# Patient Record
Sex: MALE | Race: WHITE | NOT HISPANIC OR LATINO | Employment: OTHER | URBAN - METROPOLITAN AREA
[De-identification: names, ages, dates, MRNs, and addresses within clinical notes are randomized per-mention and may not be internally consistent; named-entity substitution may affect disease eponyms.]

---

## 2017-02-20 RX ORDER — OMEPRAZOLE 20 MG/1
CAPSULE, DELAYED RELEASE ORAL
Qty: 90 CAPSULE | Refills: 1 | Status: SHIPPED | OUTPATIENT
Start: 2017-02-20 | End: 2017-07-07 | Stop reason: SDUPTHER

## 2017-03-01 ENCOUNTER — LAB (OUTPATIENT)
Dept: ENDOCRINOLOGY | Age: 77
End: 2017-03-01

## 2017-03-01 DIAGNOSIS — E78.5 ELEVATED LIPIDS: ICD-10-CM

## 2017-03-01 DIAGNOSIS — IMO0002 UNCONTROLLED TYPE 2 DIABETES MELLITUS WITH OTHER SPECIFIED COMPLICATION: ICD-10-CM

## 2017-03-01 DIAGNOSIS — E55.9 VITAMIN D DEFICIENCY: ICD-10-CM

## 2017-03-01 DIAGNOSIS — R80.9 MICROALBUMINURIA: ICD-10-CM

## 2017-03-01 DIAGNOSIS — E29.1 HYPOGONADISM IN MALE: ICD-10-CM

## 2017-03-01 DIAGNOSIS — E78.5 HYPERLIPIDEMIA, UNSPECIFIED HYPERLIPIDEMIA TYPE: Primary | ICD-10-CM

## 2017-03-01 DIAGNOSIS — E55.9 VITAMIN D DEFICIENCY: Primary | ICD-10-CM

## 2017-03-01 DIAGNOSIS — E78.5 HYPERLIPIDEMIA, UNSPECIFIED HYPERLIPIDEMIA TYPE: ICD-10-CM

## 2017-03-02 LAB
25(OH)D3+25(OH)D2 SERPL-MCNC: 41.9 NG/ML (ref 30–100)
ALBUMIN SERPL-MCNC: 4.2 G/DL (ref 3.5–5.2)
ALBUMIN/GLOB SERPL: 2 G/DL
ALP SERPL-CCNC: 52 U/L (ref 39–117)
ALT SERPL-CCNC: 16 U/L (ref 1–41)
AST SERPL-CCNC: 17 U/L (ref 1–40)
BILIRUB SERPL-MCNC: 0.3 MG/DL (ref 0.1–1.2)
BUN SERPL-MCNC: 19 MG/DL (ref 8–23)
BUN/CREAT SERPL: 15.8 (ref 7–25)
C PEPTIDE SERPL-MCNC: 0.7 NG/ML (ref 1.1–4.4)
CALCIUM SERPL-MCNC: 9.2 MG/DL (ref 8.6–10.5)
CHLORIDE SERPL-SCNC: 103 MMOL/L (ref 98–107)
CHOLEST SERPL-MCNC: 105 MG/DL (ref 0–200)
CO2 SERPL-SCNC: 26.3 MMOL/L (ref 22–29)
CONV COMMENT: ABNORMAL
CREAT SERPL-MCNC: 1.2 MG/DL (ref 0.76–1.27)
GLOBULIN SER CALC-MCNC: 2.1 GM/DL
GLUCOSE SERPL-MCNC: 76 MG/DL (ref 65–99)
HBA1C MFR BLD: 8.08 % (ref 4.8–5.6)
HDLC SERPL-MCNC: 35 MG/DL (ref 40–60)
LDLC SERPL CALC-MCNC: 49 MG/DL (ref 0–100)
MICROALBUMIN UR-MCNC: 4.5 UG/ML
POTASSIUM SERPL-SCNC: 4.4 MMOL/L (ref 3.5–5.2)
PROT SERPL-MCNC: 6.3 G/DL (ref 6–8.5)
SHBG SERPL-SCNC: 34.6 NMOL/L (ref 19.3–76.4)
SODIUM SERPL-SCNC: 143 MMOL/L (ref 136–145)
T3FREE SERPL-MCNC: 3.3 PG/ML (ref 2–4.4)
T4 FREE SERPL-MCNC: 1.02 NG/DL (ref 0.93–1.7)
T4 SERPL-MCNC: 5.82 MCG/DL (ref 4.5–11.7)
TESTOST FREE SERPL-MCNC: 5 PG/ML (ref 6.6–18.1)
TESTOST SERPL-MCNC: 258 NG/DL (ref 348–1197)
TRIGL SERPL-MCNC: 105 MG/DL (ref 0–150)
TSH SERPL DL<=0.005 MIU/L-ACNC: 2.31 MIU/ML (ref 0.27–4.2)
URATE SERPL-MCNC: 6.3 MG/DL (ref 3.4–7)
VLDLC SERPL CALC-MCNC: 21 MG/DL (ref 5–40)

## 2017-03-22 ENCOUNTER — OFFICE VISIT (OUTPATIENT)
Dept: ENDOCRINOLOGY | Age: 77
End: 2017-03-22

## 2017-03-22 VITALS
DIASTOLIC BLOOD PRESSURE: 82 MMHG | HEIGHT: 72 IN | SYSTOLIC BLOOD PRESSURE: 134 MMHG | BODY MASS INDEX: 35.87 KG/M2 | WEIGHT: 264.8 LBS | RESPIRATION RATE: 16 BRPM

## 2017-03-22 DIAGNOSIS — IMO0002 UNCONTROLLED TYPE 2 DIABETES MELLITUS WITH OTHER SPECIFIED COMPLICATION: Primary | ICD-10-CM

## 2017-03-22 DIAGNOSIS — I10 BENIGN ESSENTIAL HYPERTENSION: ICD-10-CM

## 2017-03-22 DIAGNOSIS — E55.9 VITAMIN D DEFICIENCY: ICD-10-CM

## 2017-03-22 DIAGNOSIS — R80.9 MICROALBUMINURIA: ICD-10-CM

## 2017-03-22 DIAGNOSIS — E78.5 HYPERLIPIDEMIA, UNSPECIFIED HYPERLIPIDEMIA TYPE: ICD-10-CM

## 2017-03-22 PROCEDURE — 99214 OFFICE O/P EST MOD 30 MIN: CPT | Performed by: INTERNAL MEDICINE

## 2017-03-22 RX ORDER — CANAGLIFLOZIN 300 MG/1
300 TABLET, FILM COATED ORAL DAILY
Qty: 90 TABLET | Refills: 3 | Status: SHIPPED | OUTPATIENT
Start: 2017-03-22 | End: 2017-08-14 | Stop reason: SDUPTHER

## 2017-03-22 NOTE — PROGRESS NOTES
"Subjective   Noel Garcia is a 76 y.o. male seen for follow up for DM2, hyperlipidemia, HTN, vit d deficiency, lab review. He is checking BG once a day. No BG readings. He denies any problems or concerns.   History of Present Illness this is a 76-year-old gentleman known patient with type II diabetes hypertension dyslipidemia and vitamin D deficiency.  Over the course of last 6 months he has had no significant health problems for which to go to the emergency room or hospital  /82  Resp 16  Ht 72\" (182.9 cm)  Wt 264 lb 12.8 oz (120 kg)  BMI 35.91 kg/m2  Allergies   Allergen Reactions   • Atorvastatin    • Gemfibrozil        Current Outpatient Prescriptions:   •  ACCU-CHEK ROSETTA test strip, Check bs's 3 times daily, Disp: 300 each, Rfl: 4  •  ACCU-CHEK FASTCLIX LANCETS misc, Check 3 times daily, Disp: 300 each, Rfl: 4  •  aspirin 325 MG tablet, Take 1 tablet by mouth daily., Disp: , Rfl:   •  Canagliflozin 100 MG tablet, Take 1 po daily, Disp: 90 tablet, Rfl: 1  •  ergocalciferol (ERGOCALCIFEROL) 79741 UNITS capsule, Take 1 capsule by mouth 1 (One) Time Per Week., Disp: 13 capsule, Rfl: 3  •  ezetimibe-simvastatin (VYTORIN) 10-40 MG per tablet, Take 1 po daily, Disp: 90 tablet, Rfl: 3  •  Insulin Glargine 300 UNIT/ML solution pen-injector, Inject 130 Units under the skin Every Morning., Disp: 36 pen, Rfl: 3  •  Insulin Pen Needle 31G X 5 MM misc, 1 Device 2 (Two) Times a Day., Disp: 200 each, Rfl: 3  •  linagliptin (TRADJENTA) 5 MG tablet tablet, Take 1 tablet by mouth Daily., Disp: 90 tablet, Rfl: 3  •  omeprazole (priLOSEC) 20 MG capsule, Take 1 capsule by mouth  daily, Disp: 90 capsule, Rfl: 1  •  pioglitazone-metFORMIN (ACTOPLUS MET)  MG per tablet, Take 1 tablet by mouth 2 (Two) Times a Day., Disp: 180 tablet, Rfl: 3    The following portions of the patient's history were reviewed and updated as appropriate: allergies, current medications, past family history, past medical history, past social " history, past surgical history and problem list.    Review of Systems   Constitutional: Negative.    HENT: Negative.    Eyes: Negative.    Respiratory: Negative.    Cardiovascular: Negative.    Gastrointestinal: Negative.    Endocrine: Negative.    Genitourinary: Negative.    Musculoskeletal: Negative.    Skin: Negative.    Allergic/Immunologic: Negative.    Neurological: Negative.    Hematological: Negative.    Psychiatric/Behavioral: Negative.        Objective   Physical Exam   Constitutional: He is oriented to person, place, and time. He appears well-developed and well-nourished. No distress.   HENT:   Head: Normocephalic and atraumatic.   Right Ear: External ear normal.   Left Ear: External ear normal.   Nose: Nose normal.   Mouth/Throat: Oropharynx is clear and moist. No oropharyngeal exudate.   Eyes: Conjunctivae and EOM are normal. Pupils are equal, round, and reactive to light. Right eye exhibits no discharge. Left eye exhibits no discharge. No scleral icterus.   Neck: Normal range of motion. Neck supple. No JVD present. No tracheal deviation present. No thyromegaly present.   Cardiovascular: Normal rate, regular rhythm, normal heart sounds and intact distal pulses.  Exam reveals no gallop and no friction rub.    No murmur heard.  Pulmonary/Chest: Effort normal and breath sounds normal. No stridor. No respiratory distress. He has no wheezes. He has no rales. He exhibits no tenderness.   Abdominal: Soft. Bowel sounds are normal. He exhibits no distension and no mass. There is no tenderness. There is no rebound and no guarding. No hernia.   Musculoskeletal: Normal range of motion. He exhibits no edema, tenderness or deformity.   Lymphadenopathy:     He has no cervical adenopathy.   Neurological: He is alert and oriented to person, place, and time. He has normal reflexes. He displays normal reflexes. No cranial nerve deficit. He exhibits normal muscle tone. Coordination normal.   Skin: Skin is warm and dry. No  rash noted. He is not diaphoretic. No erythema. No pallor.   Psychiatric: He has a normal mood and affect. His behavior is normal. Judgment and thought content normal.   Nursing note and vitals reviewed.     Results for orders placed or performed in visit on 03/01/17   Comprehensive Metabolic Panel   Result Value Ref Range    Glucose 76 65 - 99 mg/dL    BUN 19 8 - 23 mg/dL    Creatinine 1.20 0.76 - 1.27 mg/dL    eGFR Non African Am 59 (L) >60 mL/min/1.73    eGFR African Am 71 >60 mL/min/1.73    BUN/Creatinine Ratio 15.8 7.0 - 25.0    Sodium 143 136 - 145 mmol/L    Potassium 4.4 3.5 - 5.2 mmol/L    Chloride 103 98 - 107 mmol/L    Total CO2 26.3 22.0 - 29.0 mmol/L    Calcium 9.2 8.6 - 10.5 mg/dL    Total Protein 6.3 6.0 - 8.5 g/dL    Albumin 4.20 3.50 - 5.20 g/dL    Globulin 2.1 gm/dL    A/G Ratio 2.0 g/dL    Total Bilirubin 0.3 0.1 - 1.2 mg/dL    Alkaline Phosphatase 52 39 - 117 U/L    AST (SGOT) 17 1 - 40 U/L    ALT (SGPT) 16 1 - 41 U/L   C-Peptide   Result Value Ref Range    C-Peptide 0.7 (L) 1.1 - 4.4 ng/mL   Hemoglobin A1c   Result Value Ref Range    Hemoglobin A1C 8.08 (H) 4.80 - 5.60 %   Lipid Panel   Result Value Ref Range    Total Cholesterol 105 0 - 200 mg/dL    Triglycerides 105 0 - 150 mg/dL    HDL Cholesterol 35 (L) 40 - 60 mg/dL    VLDL Cholesterol 21 5 - 40 mg/dL    LDL Cholesterol  49 0 - 100 mg/dL   Uric Acid   Result Value Ref Range    Uric Acid 6.3 3.4 - 7.0 mg/dL   T4 & TSH ([facility])   Result Value Ref Range    TSH 2.310 0.270 - 4.200 mIU/mL    T4, Total 5.82 4.50 - 11.70 mcg/dL   TestT+TestF+SHBG   Result Value Ref Range    Testosterone, Total 258 (L) 348 - 1197 ng/dL    Comment Comment     Testosterone, Free 5.0 (L) 6.6 - 18.1 pg/mL    Sex Hormone Binding Globulin 34.6 19.3 - 76.4 nmol/L   T3, Free   Result Value Ref Range    T3, Free 3.3 2.0 - 4.4 pg/mL   T4, Free   Result Value Ref Range    Free T4 1.02 0.93 - 1.70 ng/dL   MicroAlbumin, Urine, Random   Result Value Ref Range    Microalbumin,  Urine 4.5 Not Estab. ug/mL           Assessment/Plan   Diagnoses and all orders for this visit:    Uncontrolled type 2 diabetes mellitus with other specified complication  -     T4 & TSH ([facility]); Future  -     Uric Acid; Future  -     Vitamin D 25 Hydroxy; Future  -     Comprehensive Metabolic Panel; Future  -     C-Peptide; Future  -     Hemoglobin A1c; Future  -     Lipid Panel; Future  -     MicroAlbumin, Urine, Random; Future    Vitamin D deficiency  -     T4 & TSH ([facility]); Future  -     Uric Acid; Future  -     Vitamin D 25 Hydroxy; Future  -     Comprehensive Metabolic Panel; Future  -     C-Peptide; Future  -     Hemoglobin A1c; Future  -     Lipid Panel; Future  -     MicroAlbumin, Urine, Random; Future    Hyperlipidemia, unspecified hyperlipidemia type  -     T4 & TSH ([facility]); Future  -     Uric Acid; Future  -     Vitamin D 25 Hydroxy; Future  -     Comprehensive Metabolic Panel; Future  -     C-Peptide; Future  -     Hemoglobin A1c; Future  -     Lipid Panel; Future  -     MicroAlbumin, Urine, Random; Future    Benign essential hypertension  -     T4 & TSH ([facility]); Future  -     Uric Acid; Future  -     Vitamin D 25 Hydroxy; Future  -     Comprehensive Metabolic Panel; Future  -     C-Peptide; Future  -     Hemoglobin A1c; Future  -     Lipid Panel; Future  -     MicroAlbumin, Urine, Random; Future    Microalbuminuria  -     T4 & TSH ([facility]); Future  -     Uric Acid; Future  -     Vitamin D 25 Hydroxy; Future  -     Comprehensive Metabolic Panel; Future  -     C-Peptide; Future  -     Hemoglobin A1c; Future  -     Lipid Panel; Future  -     MicroAlbumin, Urine, Random; Future    Other orders  -     INVOKANA 300 MG tablet; Take 300 mg by mouth Daily.               In summary I saw and examined this 76-year-old gentleman for above-mentioned problems.  I reviewed his laboratory evaluation of 03/01/2017 and provided him with a hard copy of it.  Overall he is clinically and metabolically stable with  a hemoglobin A1c of 8.08.  He will continue all his current prescriptions and the will see Ms. Danielle Liang in 4 months and myself in 8 months or sooner if needed with laboratory evaluation prior to each office visit.

## 2017-04-04 ENCOUNTER — OFFICE VISIT (OUTPATIENT)
Dept: INTERNAL MEDICINE | Age: 77
End: 2017-04-04

## 2017-04-04 VITALS
HEART RATE: 72 BPM | HEIGHT: 71 IN | DIASTOLIC BLOOD PRESSURE: 80 MMHG | BODY MASS INDEX: 36.68 KG/M2 | WEIGHT: 262 LBS | RESPIRATION RATE: 12 BRPM | SYSTOLIC BLOOD PRESSURE: 138 MMHG

## 2017-04-04 DIAGNOSIS — M54.42 CHRONIC BILATERAL LOW BACK PAIN WITH BILATERAL SCIATICA: Primary | ICD-10-CM

## 2017-04-04 DIAGNOSIS — M54.41 CHRONIC BILATERAL LOW BACK PAIN WITH BILATERAL SCIATICA: Primary | ICD-10-CM

## 2017-04-04 DIAGNOSIS — I10 BENIGN ESSENTIAL HYPERTENSION: ICD-10-CM

## 2017-04-04 DIAGNOSIS — G89.29 CHRONIC BILATERAL LOW BACK PAIN WITH BILATERAL SCIATICA: Primary | ICD-10-CM

## 2017-04-04 PROCEDURE — 99214 OFFICE O/P EST MOD 30 MIN: CPT | Performed by: INTERNAL MEDICINE

## 2017-04-04 NOTE — PROGRESS NOTES
"  Noel Garcia is a 76 y.o. male who presents with   Chief Complaint   Patient presents with   • Back Pain     Approximately one year ago while jumping off of a pickup truck he developed back pain within 24 hours.  He saw a chiropractor who helped him at the time.  He said he had no further problems up until 3-4 months ago when he was bending over and felt his low back \"pop\".  Since then he has been having worsening low back pain with pain in his hips and legs and general leg fatigue.   • Hypertension     Check blood pressure   • Diabetes     Continued treatment and management via endocrinology.   .    Back Pain   This is a chronic problem. The current episode started more than 1 month ago. The problem occurs constantly. The problem has been gradually worsening since onset. The pain is present in the lumbar spine. The pain radiates to the left foot, left knee, left thigh, right foot and right thigh. The pain is mild. The symptoms are aggravated by bending. Stiffness is present all day. Associated symptoms include leg pain and weakness. Pertinent negatives include no dysuria, fever, numbness, paresis, paresthesias, pelvic pain, perianal numbness or tingling. He has tried nothing for the symptoms.   Hypertension   This is a chronic problem. The current episode started more than 1 year ago. The problem is controlled. Past treatments include nothing.   Diabetes   He has type 2 (Treatment and management per endocrinology.  Lab testing being done through endocrinology office.) diabetes mellitus. Associated symptoms include weakness.        The following portions of the patient's history were reviewed and updated as appropriate: allergies, current medications, past medical history and problem list.    Review of Systems   Constitutional: Negative for fever.   Genitourinary: Negative for dysuria and pelvic pain.   Musculoskeletal: Positive for back pain.   Neurological: Positive for weakness. Negative for tingling, " numbness and paresthesias.       Objective   Physical Exam   Constitutional: He is oriented to person, place, and time. He appears well-developed and well-nourished. No distress.   HENT:   Head: Normocephalic and atraumatic.   Eyes: Conjunctivae and EOM are normal. Pupils are equal, round, and reactive to light.   Neck: Normal range of motion. Neck supple. No thyromegaly present.   Neck exam negative.  Carotid auscultation normal-no bruits heard.   Cardiovascular: Normal rate, regular rhythm, normal heart sounds and intact distal pulses.  Exam reveals no gallop and no friction rub.    No murmur heard.  Pulmonary/Chest: Effort normal and breath sounds normal. No respiratory distress. He has no wheezes. He has no rales. He exhibits no tenderness.   Musculoskeletal: Normal range of motion. He exhibits no edema, tenderness or deformity.   Neurological: He is alert and oriented to person, place, and time. He displays normal reflexes. No cranial nerve deficit. He exhibits normal muscle tone. Coordination normal.   Psychiatric: He has a normal mood and affect. His behavior is normal. Judgment and thought content normal.   Nursing note and vitals reviewed.      Assessment/Plan   Noel was seen today for back pain, hypertension and diabetes.    Diagnoses and all orders for this visit:    Chronic bilateral low back pain with bilateral sciatica  -     MRI Lumbar Spine Without Contrast; Future    Benign essential hypertension      Plan: MRI as above.  OTC Advil-3 3 times a day with food pending MRI report.  Continue all other treatment as prescribed.  Patient currently on no blood pressure medications.    Continue diabetic care via endocrinology.  Follow-up for backache she pending today's MRI report.

## 2017-04-11 ENCOUNTER — HOSPITAL ENCOUNTER (OUTPATIENT)
Dept: MRI IMAGING | Facility: HOSPITAL | Age: 77
Discharge: HOME | End: 2017-04-11
Admitting: INTERNAL MEDICINE

## 2017-04-11 DIAGNOSIS — M54.41 CHRONIC BILATERAL LOW BACK PAIN WITH BILATERAL SCIATICA: ICD-10-CM

## 2017-04-11 DIAGNOSIS — G89.29 CHRONIC BILATERAL LOW BACK PAIN WITH BILATERAL SCIATICA: ICD-10-CM

## 2017-04-11 DIAGNOSIS — M54.42 CHRONIC BILATERAL LOW BACK PAIN WITH BILATERAL SCIATICA: ICD-10-CM

## 2017-04-11 PROCEDURE — 72148 MRI LUMBAR SPINE W/O DYE: CPT

## 2017-04-12 ENCOUNTER — TELEPHONE (OUTPATIENT)
Dept: INTERNAL MEDICINE | Age: 77
End: 2017-04-12

## 2017-04-12 NOTE — TELEPHONE ENCOUNTER
----- Message from Peyman Encarnacion MD sent at 4/12/2017  6:15 AM EDT -----  Call the patient with this report.    MRI of the  lumbar spine shows multilevel disc bulging and degenerative (arthritic) changes at multiple levels.  There is also narrowing of the spinal canal at L4-5 which is a common finding with degenerative changes.  There is a disc tear at L3-4 but no disc bulging.  Basically these findings are all very common and are many times associated with the general aging process.  At this point there does not appear to be anything of a surgical nature however a neurosurgical referral and opinion in the future would not totally be ruled out at this time.  At this point treatment would involve usage of anti-inflammatory agents such as ibuprofen (200 mg per tablet over-the-counter) in a minimum dose of 600  mg (3 tablets)  3 times a day with food.  If this is ineffective then a trial of oral steroid's and physical therapy referral may be necessary.  If problems persist.  Follow-up visit is suggested.

## 2017-07-07 DIAGNOSIS — I10 BENIGN ESSENTIAL HYPERTENSION: ICD-10-CM

## 2017-07-07 DIAGNOSIS — E78.5 HYPERLIPIDEMIA: ICD-10-CM

## 2017-07-07 DIAGNOSIS — E55.9 VITAMIN D DEFICIENCY: ICD-10-CM

## 2017-07-07 DIAGNOSIS — E66.9 ADIPOSITY: ICD-10-CM

## 2017-07-07 DIAGNOSIS — E11.65 TYPE 2 DIABETES MELLITUS WITH HYPERGLYCEMIA (HCC): ICD-10-CM

## 2017-07-07 DIAGNOSIS — R80.9 MICROALBUMINURIA: ICD-10-CM

## 2017-07-07 DIAGNOSIS — E29.1 HYPOGONADISM IN MALE: ICD-10-CM

## 2017-07-07 DIAGNOSIS — N40.0 BENIGN NON-NODULAR PROSTATIC HYPERPLASIA WITHOUT LOWER URINARY TRACT SYMPTOMS: ICD-10-CM

## 2017-07-07 RX ORDER — INSULIN GLARGINE 300 U/ML
INJECTION, SOLUTION SUBCUTANEOUS
Qty: 45 ML | Refills: 1 | Status: SHIPPED | OUTPATIENT
Start: 2017-07-07 | End: 2017-08-11 | Stop reason: SDUPTHER

## 2017-07-07 RX ORDER — OMEPRAZOLE 20 MG/1
CAPSULE, DELAYED RELEASE ORAL
Qty: 90 CAPSULE | Refills: 1 | Status: SHIPPED | OUTPATIENT
Start: 2017-07-07 | End: 2018-06-28 | Stop reason: SDUPTHER

## 2017-07-19 DIAGNOSIS — N40.0 BENIGN NON-NODULAR PROSTATIC HYPERPLASIA WITHOUT LOWER URINARY TRACT SYMPTOMS: ICD-10-CM

## 2017-07-19 DIAGNOSIS — E55.9 VITAMIN D DEFICIENCY: ICD-10-CM

## 2017-07-19 DIAGNOSIS — IMO0002 UNCONTROLLED TYPE 2 DIABETES MELLITUS WITH OTHER SPECIFIED COMPLICATION: ICD-10-CM

## 2017-07-19 DIAGNOSIS — E29.1 HYPOGONADISM IN MALE: Primary | ICD-10-CM

## 2017-07-21 ENCOUNTER — RESULTS ENCOUNTER (OUTPATIENT)
Dept: ENDOCRINOLOGY | Age: 77
End: 2017-07-21

## 2017-07-21 DIAGNOSIS — I10 BENIGN ESSENTIAL HYPERTENSION: ICD-10-CM

## 2017-07-21 DIAGNOSIS — E55.9 VITAMIN D DEFICIENCY: ICD-10-CM

## 2017-07-21 DIAGNOSIS — IMO0002 UNCONTROLLED TYPE 2 DIABETES MELLITUS WITH OTHER SPECIFIED COMPLICATION: ICD-10-CM

## 2017-07-21 DIAGNOSIS — R80.9 MICROALBUMINURIA: ICD-10-CM

## 2017-07-21 DIAGNOSIS — E78.5 HYPERLIPIDEMIA, UNSPECIFIED HYPERLIPIDEMIA TYPE: ICD-10-CM

## 2017-07-28 ENCOUNTER — LAB (OUTPATIENT)
Dept: ENDOCRINOLOGY | Age: 77
End: 2017-07-28

## 2017-07-28 DIAGNOSIS — IMO0002 UNCONTROLLED TYPE 2 DIABETES MELLITUS WITH OTHER SPECIFIED COMPLICATION: ICD-10-CM

## 2017-07-28 DIAGNOSIS — E29.1 HYPOGONADISM IN MALE: ICD-10-CM

## 2017-07-28 DIAGNOSIS — E55.9 VITAMIN D DEFICIENCY: ICD-10-CM

## 2017-07-28 DIAGNOSIS — N40.0 BENIGN NON-NODULAR PROSTATIC HYPERPLASIA WITHOUT LOWER URINARY TRACT SYMPTOMS: ICD-10-CM

## 2017-07-30 LAB
25(OH)D3+25(OH)D2 SERPL-MCNC: 29.8 NG/ML (ref 30–100)
ALBUMIN SERPL-MCNC: 4.2 G/DL (ref 3.5–5.2)
ALBUMIN/GLOB SERPL: 1.7 G/DL
ALP SERPL-CCNC: 46 U/L (ref 39–117)
ALT SERPL-CCNC: 16 U/L (ref 1–41)
AST SERPL-CCNC: 15 U/L (ref 1–40)
BILIRUB SERPL-MCNC: 0.3 MG/DL (ref 0.1–1.2)
BUN SERPL-MCNC: 17 MG/DL (ref 8–23)
BUN/CREAT SERPL: 14 (ref 7–25)
C PEPTIDE SERPL-MCNC: 1.8 NG/ML (ref 1.1–4.4)
CALCIUM SERPL-MCNC: 9.6 MG/DL (ref 8.6–10.5)
CHLORIDE SERPL-SCNC: 103 MMOL/L (ref 98–107)
CHOLEST SERPL-MCNC: 112 MG/DL (ref 0–200)
CO2 SERPL-SCNC: 25 MMOL/L (ref 22–29)
CREAT SERPL-MCNC: 1.21 MG/DL (ref 0.76–1.27)
GLOBULIN SER CALC-MCNC: 2.5 GM/DL
GLUCOSE SERPL-MCNC: 117 MG/DL (ref 65–99)
HBA1C MFR BLD: 7.21 % (ref 4.8–5.6)
HCT VFR BLD AUTO: 44.9 % (ref 40.4–52.2)
HDLC SERPL-MCNC: 34 MG/DL (ref 40–60)
HGB BLD-MCNC: 13.9 G/DL (ref 13.7–17.6)
LDLC SERPL CALC-MCNC: 50 MG/DL (ref 0–100)
MICROALBUMIN UR-MCNC: 8.5 UG/ML
POTASSIUM SERPL-SCNC: 4.7 MMOL/L (ref 3.5–5.2)
PROT SERPL-MCNC: 6.7 G/DL (ref 6–8.5)
PSA SERPL-MCNC: 0.84 NG/ML (ref 0–4)
SHBG SERPL-SCNC: 43.3 NMOL/L (ref 19.3–76.4)
SODIUM SERPL-SCNC: 143 MMOL/L (ref 136–145)
TESTOST FREE SERPL-MCNC: 3.7 PG/ML (ref 6.6–18.1)
TESTOST SERPL-MCNC: 160 NG/DL (ref 264–916)
TRIGL SERPL-MCNC: 141 MG/DL (ref 0–150)
VLDLC SERPL CALC-MCNC: 28.2 MG/DL (ref 5–40)

## 2017-08-11 ENCOUNTER — OFFICE VISIT (OUTPATIENT)
Dept: ENDOCRINOLOGY | Age: 77
End: 2017-08-11

## 2017-08-11 VITALS
DIASTOLIC BLOOD PRESSURE: 74 MMHG | BODY MASS INDEX: 35.68 KG/M2 | SYSTOLIC BLOOD PRESSURE: 122 MMHG | HEIGHT: 72 IN | WEIGHT: 263.4 LBS

## 2017-08-11 DIAGNOSIS — E29.1 HYPOGONADISM IN MALE: ICD-10-CM

## 2017-08-11 DIAGNOSIS — E78.5 HYPERLIPIDEMIA, UNSPECIFIED HYPERLIPIDEMIA TYPE: ICD-10-CM

## 2017-08-11 DIAGNOSIS — I10 ESSENTIAL HYPERTENSION: ICD-10-CM

## 2017-08-11 DIAGNOSIS — N40.0 BENIGN NON-NODULAR PROSTATIC HYPERPLASIA WITHOUT LOWER URINARY TRACT SYMPTOMS: ICD-10-CM

## 2017-08-11 DIAGNOSIS — I10 BENIGN ESSENTIAL HYPERTENSION: ICD-10-CM

## 2017-08-11 DIAGNOSIS — R80.9 MICROALBUMINURIA: ICD-10-CM

## 2017-08-11 DIAGNOSIS — IMO0002 UNCONTROLLED TYPE 2 DIABETES MELLITUS WITH COMPLICATION, WITH LONG-TERM CURRENT USE OF INSULIN: Primary | ICD-10-CM

## 2017-08-11 DIAGNOSIS — E55.9 VITAMIN D DEFICIENCY: ICD-10-CM

## 2017-08-11 DIAGNOSIS — E66.9 ADIPOSITY: ICD-10-CM

## 2017-08-11 PROCEDURE — 99214 OFFICE O/P EST MOD 30 MIN: CPT | Performed by: NURSE PRACTITIONER

## 2017-08-11 RX ORDER — ERGOCALCIFEROL 1.25 MG/1
CAPSULE ORAL
Qty: 24 CAPSULE | Refills: 1 | Status: SHIPPED | OUTPATIENT
Start: 2017-08-11 | End: 2017-08-14 | Stop reason: SDUPTHER

## 2017-08-11 NOTE — PROGRESS NOTES
"Subjective   Noel Garcia is a 77 y.o. male is here today for follow-up.  Chief Complaint   Patient presents with   • Diabetes     recent labs, testing BG 1 time daily, pt brought meter   • Hypogonadism   • Vitamin D Deficiency     /74  Ht 72\" (182.9 cm)  Wt 263 lb 6.4 oz (119 kg)  BMI 35.72 kg/m2  Current Outpatient Prescriptions on File Prior to Visit   Medication Sig   • ACCU-CHEK ROSETTA test strip Check bs's 3 times daily   • ACCU-CHEK FASTCLIX LANCETS misc Check 3 times daily   • aspirin 325 MG tablet Take 1 tablet by mouth daily.   • ergocalciferol (ERGOCALCIFEROL) 42587 UNITS capsule Take 1 capsule by mouth 1 (One) Time Per Week.   • ezetimibe-simvastatin (VYTORIN) 10-40 MG per tablet Take 1 po daily   • Insulin Glargine 300 UNIT/ML solution pen-injector Inject 130 Units under the skin Every Morning. (Patient taking differently: Inject 140 Units under the skin Every Morning.)   • Insulin Pen Needle 31G X 5 MM misc 1 Device 2 (Two) Times a Day.   • INVOKANA 300 MG tablet Take 300 mg by mouth Daily.   • linagliptin (TRADJENTA) 5 MG tablet tablet Take 1 tablet by mouth Daily.   • omeprazole (priLOSEC) 20 MG capsule Take 1 capsule by mouth  daily   • pioglitazone-metFORMIN (ACTOPLUS MET)  MG per tablet Take 1 tablet by mouth 2 (Two) Times a Day.   • [DISCONTINUED] TOUJEO SOLOSTAR 300 UNIT/ML solution pen-injector Inject 140 units under the  skin every morning     No current facility-administered medications on file prior to visit.      Family History   Problem Relation Age of Onset   • Hypertension Father    • Coronary artery disease Father    • Diabetes Father    • Hyperlipidemia Father    • Coronary artery disease Brother      Social History   Substance Use Topics   • Smoking status: Former Smoker   • Smokeless tobacco: None   • Alcohol use Yes      Comment: SOCIAL     Allergies   Allergen Reactions   • Atorvastatin    • Gemfibrozil          History of Present Illness  Encounter Diagnoses "   Name Primary?   • Adiposity    • Vitamin D deficiency    • Uncontrolled type 2 diabetes mellitus with complication, with long-term current use of insulin Yes   • Essential hypertension    • Hyperlipidemia, unspecified hyperlipidemia type    • Microalbuminuria    • Benign essential hypertension    • Hypogonadism in male    • Benign non-nodular prostatic hyperplasia without lower urinary tract symptoms      This is a 77-year-old male patient here today for routine follow-up visit for the above-mentioned problems.  He is complaining of weight gain however according to our records his weight has remained stable.  He states his wife is dietary he is also trying to watch what he eats.  He had recent labs which were reviewed and he was provided a copy.  He states he does experience hypoglycemia on occasion however he does not Share with the meter and said he just recently feels better.  He states the last time he had a low blood sugar he was in the airport [place] and because he did not feel well he drank a carbonate stickers bar and felt better.  He is checking his blood sugars several times weekly typically in the morning.  His most recent hemoglobin A1c has improved and is almost ago.  He does not remember to take his vitamin D consistently.  The following portions of the patient's history were reviewed and updated as appropriate: allergies, current medications, past family history, past medical history, past social history, past surgical history and problem list.    Review of Systems   Constitutional: Negative for fatigue.   HENT: Negative for trouble swallowing.    Eyes: Negative for visual disturbance.   Respiratory: Negative for shortness of breath.    Cardiovascular: Negative for leg swelling.   Endocrine: Negative for polyphagia.   Skin: Negative for wound.   Neurological: Negative for numbness.       Objective   Physical Exam   Constitutional: He is oriented to person, place, and time. He  appears well-developed and well-nourished. No distress.   HENT:   Head: Normocephalic and atraumatic.   Right Ear: External ear normal.   Left Ear: External ear normal.   Nose: Nose normal.   Eyes: Pupils are equal, round, and reactive to light. Right eye exhibits no discharge. Left eye exhibits no discharge.   Neck: Normal range of motion. Neck supple. Carotid bruit is not present. No tracheal deviation, no edema and no erythema present. No thyromegaly present.   Cardiovascular: Normal rate, regular rhythm, normal heart sounds and intact distal pulses.  Exam reveals no gallop and no friction rub.    No murmur heard.  Pulmonary/Chest: Effort normal and breath sounds normal. No respiratory distress. He has no wheezes. He has no rales.   Abdominal: Soft. Bowel sounds are normal. He exhibits no distension. There is no tenderness.   Musculoskeletal: Normal range of motion. He exhibits no edema or deformity.   Lymphadenopathy:     He has no cervical adenopathy.   Neurological: He is alert and oriented to person, place, and time. Coordination normal.   Skin: Skin is warm and dry. No rash noted. He is not diaphoretic. No erythema. No pallor.   Psychiatric: He has a normal mood and affect. His behavior is normal. Judgment and thought content normal.   Nursing note and vitals reviewed.      Results for orders placed or performed in visit on 07/28/17   Comprehensive Metabolic Panel   Result Value Ref Range    Glucose 117 (H) 65 - 99 mg/dL    BUN 17 8 - 23 mg/dL    Creatinine 1.21 0.76 - 1.27 mg/dL    eGFR Non African Am 58 (L) >60 mL/min/1.73    eGFR African Am 70 >60 mL/min/1.73    BUN/Creatinine Ratio 14.0 7.0 - 25.0    Sodium 143 136 - 145 mmol/L    Potassium 4.7 3.5 - 5.2 mmol/L    Chloride 103 98 - 107 mmol/L    Total CO2 25.0 22.0 - 29.0 mmol/L    Calcium 9.6 8.6 - 10.5 mg/dL    Total Protein 6.7 6.0 - 8.5 g/dL    Albumin 4.20 3.50 - 5.20 g/dL    Globulin 2.5 gm/dL    A/G Ratio 1.7 g/dL    Total Bilirubin 0.3 0.1 - 1.2  mg/dL    Alkaline Phosphatase 46 39 - 117 U/L    AST (SGOT) 15 1 - 40 U/L    ALT (SGPT) 16 1 - 41 U/L   Lipid Panel   Result Value Ref Range    Total Cholesterol 112 0 - 200 mg/dL    Triglycerides 141 0 - 150 mg/dL    HDL Cholesterol 34 (L) 40 - 60 mg/dL    VLDL Cholesterol 28.2 5 - 40 mg/dL    LDL Cholesterol  50 0 - 100 mg/dL   Vitamin D 25 Hydroxy   Result Value Ref Range    25 Hydroxy, Vitamin D 29.8 (L) 30.0 - 100.0 ng/mL   Hemoglobin A1c   Result Value Ref Range    Hemoglobin A1C 7.21 (H) 4.80 - 5.60 %   Hemoglobin & Hematocrit, Blood   Result Value Ref Range    Hemoglobin 13.9 13.7 - 17.6 g/dL    Hematocrit 44.9 40.4 - 52.2 %   PSA   Result Value Ref Range    PSA 0.840 0.000 - 4.000 ng/mL   TestT+TestF+SHBG   Result Value Ref Range    Testosterone, Total 160 (L) 264 - 916 ng/dL    Testosterone, Free 3.7 (L) 6.6 - 18.1 pg/mL    Sex Hormone Binding Globulin 43.3 19.3 - 76.4 nmol/L   C-Peptide   Result Value Ref Range    C-Peptide 1.8 1.1 - 4.4 ng/mL   MicroAlbumin, Urine, Random   Result Value Ref Range    Microalbumin, Urine 8.5 Not Estab. ug/mL       Assessment/Plan   Noel was seen today for diabetes, hypogonadism and vitamin d deficiency.    Diagnoses and all orders for this visit:    Uncontrolled type 2 diabetes mellitus with complication, with long-term current use of insulin    Adiposity  -     ergocalciferol (ERGOCALCIFEROL) 98051 UNITS capsule; Take 1 capsule twice weekly    Vitamin D deficiency  -     ergocalciferol (ERGOCALCIFEROL) 77583 UNITS capsule; Take 1 capsule twice weekly    Essential hypertension    Hyperlipidemia, unspecified hyperlipidemia type  -     ergocalciferol (ERGOCALCIFEROL) 53579 UNITS capsule; Take 1 capsule twice weekly    Microalbuminuria  -     ergocalciferol (ERGOCALCIFEROL) 34355 UNITS capsule; Take 1 capsule twice weekly    Benign essential hypertension  -     ergocalciferol (ERGOCALCIFEROL) 60943 UNITS capsule; Take 1 capsule twice weekly    Hypogonadism in male  -      ergocalciferol (ERGOCALCIFEROL) 33982 UNITS capsule; Take 1 capsule twice weekly    Benign non-nodular prostatic hyperplasia without lower urinary tract symptoms  -     ergocalciferol (ERGOCALCIFEROL) 69207 UNITS capsule; Take 1 capsule twice weekly        In summary, patient was seen and examined.  He will continue on his current medications as prescribed.  His blood sugars were reviewed as well as his labs.  He was provided a copy of his most recent labs.  His last hemoglobin A1c reflects that his diabetes control has improved and is down from 8 to 7.2.  He will follow-up in 4 months with labs prior.  No medications were changed at today's visit.     I have Encouraged him to contact the office should he have any questions or concerns prior to his next visit.  Prescription refills for 90 days will be sent to his pharmacy.

## 2017-08-14 DIAGNOSIS — E66.9 ADIPOSITY: ICD-10-CM

## 2017-08-14 DIAGNOSIS — I10 BENIGN ESSENTIAL HYPERTENSION: ICD-10-CM

## 2017-08-14 DIAGNOSIS — N40.0 BENIGN NON-NODULAR PROSTATIC HYPERPLASIA WITHOUT LOWER URINARY TRACT SYMPTOMS: ICD-10-CM

## 2017-08-14 DIAGNOSIS — R80.9 MICROALBUMINURIA: ICD-10-CM

## 2017-08-14 DIAGNOSIS — E55.9 VITAMIN D DEFICIENCY: ICD-10-CM

## 2017-08-14 DIAGNOSIS — E29.1 HYPOGONADISM IN MALE: ICD-10-CM

## 2017-08-14 DIAGNOSIS — E11.65 TYPE 2 DIABETES MELLITUS WITH HYPERGLYCEMIA, UNSPECIFIED LONG TERM INSULIN USE STATUS: ICD-10-CM

## 2017-08-14 DIAGNOSIS — E78.5 HYPERLIPIDEMIA, UNSPECIFIED HYPERLIPIDEMIA TYPE: ICD-10-CM

## 2017-08-14 RX ORDER — PIOGLITAZONE HCL AND METFORMIN HCL 850; 15 MG/1; MG/1
1 TABLET ORAL 2 TIMES DAILY
Qty: 180 TABLET | Refills: 1 | Status: SHIPPED | OUTPATIENT
Start: 2017-08-14 | End: 2018-02-26 | Stop reason: SDUPTHER

## 2017-08-14 RX ORDER — CANAGLIFLOZIN 300 MG/1
300 TABLET, FILM COATED ORAL DAILY
Qty: 90 TABLET | Refills: 1 | Status: SHIPPED | OUTPATIENT
Start: 2017-08-14 | End: 2018-02-26 | Stop reason: SDUPTHER

## 2017-08-14 RX ORDER — ERGOCALCIFEROL 1.25 MG/1
CAPSULE ORAL
Qty: 24 CAPSULE | Refills: 1 | Status: SHIPPED | OUTPATIENT
Start: 2017-08-14 | End: 2018-02-26 | Stop reason: SDUPTHER

## 2017-08-14 RX ORDER — EZETIMIBE AND SIMVASTATIN 10; 40 MG/1; MG/1
TABLET ORAL
Qty: 90 TABLET | Refills: 1 | Status: SHIPPED | OUTPATIENT
Start: 2017-08-14 | End: 2018-02-26 | Stop reason: SDUPTHER

## 2017-08-14 RX ORDER — LANCETS
EACH MISCELLANEOUS
Qty: 300 EACH | Refills: 1 | Status: SHIPPED | OUTPATIENT
Start: 2017-08-14 | End: 2018-02-26 | Stop reason: SDUPTHER

## 2017-11-21 ENCOUNTER — OFFICE VISIT (OUTPATIENT)
Dept: INTERNAL MEDICINE | Age: 77
End: 2017-11-21

## 2017-11-21 VITALS
HEART RATE: 72 BPM | DIASTOLIC BLOOD PRESSURE: 70 MMHG | TEMPERATURE: 96.1 F | BODY MASS INDEX: 34.95 KG/M2 | WEIGHT: 258 LBS | HEIGHT: 72 IN | SYSTOLIC BLOOD PRESSURE: 120 MMHG | OXYGEN SATURATION: 91 % | RESPIRATION RATE: 12 BRPM
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
## 2017-11-21 DIAGNOSIS — G89.29 CHRONIC BILATERAL LOW BACK PAIN WITHOUT SCIATICA: ICD-10-CM

## 2017-11-21 DIAGNOSIS — M54.50 CHRONIC BILATERAL LOW BACK PAIN WITHOUT SCIATICA: ICD-10-CM

## 2017-11-21 DIAGNOSIS — M25.512 ACUTE PAIN OF LEFT SHOULDER: ICD-10-CM

## 2017-11-21 PROCEDURE — 99215 OFFICE O/P EST HI 40 MIN: CPT | Performed by: INTERNAL MEDICINE

## 2017-11-21 RX ORDER — CYCLOBENZAPRINE HCL 10 MG
10 TABLET ORAL
COMMUNITY
Start: 2017-11-15 | End: 2018-02-27
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
## 2017-12-06 ENCOUNTER — HOSPITAL ENCOUNTER (OUTPATIENT)
Dept: MRI IMAGING | Facility: HOSPITAL | Age: 77
Discharge: HOME | End: 2017-12-06

## 2017-12-06 ENCOUNTER — HOSPITAL ENCOUNTER (OUTPATIENT)
Dept: MRI IMAGING | Facility: HOSPITAL | Age: 77
Discharge: HOME | End: 2017-12-06
Admitting: INTERNAL MEDICINE

## 2017-12-06 DIAGNOSIS — M54.50 CHRONIC BILATERAL LOW BACK PAIN WITHOUT SCIATICA: ICD-10-CM
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
## 2017-12-06 DIAGNOSIS — G89.29 CHRONIC BILATERAL LOW BACK PAIN WITHOUT SCIATICA: ICD-10-CM

## 2017-12-06 DIAGNOSIS — M25.512 ACUTE PAIN OF LEFT SHOULDER: ICD-10-CM

## 2017-12-06 PROCEDURE — 73221 MRI JOINT UPR EXTREM W/O DYE: CPT

## 2017-12-06 PROCEDURE — 72148 MRI LUMBAR SPINE W/O DYE: CPT

## 2017-12-07 ENCOUNTER — TELEPHONE (OUTPATIENT)
Dept: INTERNAL MEDICINE | Age: 77
End: 2017-12-07

## 2017-12-07 NOTE — TELEPHONE ENCOUNTER
----- Message from Peyman Encarnacion MD sent at 12/7/2017  8:02 AM EST -----  Call the patient with this report.    MRI of the left shoulder shows a contusion versus tendinitis but no rotator cuff tear with treatment with Advil and cold compresses is or has been ineffective then referral to orthopedics would be advised.  If you need or requests a referral then let us know and we will enter it.

## 2018-01-08 DIAGNOSIS — IMO0002 UNCONTROLLED TYPE 2 DIABETES MELLITUS WITH COMPLICATION, WITH LONG-TERM CURRENT USE OF INSULIN: ICD-10-CM

## 2018-01-08 DIAGNOSIS — E29.1 HYPOGONADISM IN MALE: Primary | ICD-10-CM

## 2018-01-08 DIAGNOSIS — E55.9 VITAMIN D DEFICIENCY: ICD-10-CM

## 2018-01-08 DIAGNOSIS — N40.0 BENIGN PROSTATIC HYPERPLASIA WITHOUT LOWER URINARY TRACT SYMPTOMS: ICD-10-CM

## 2018-01-17 ENCOUNTER — LAB (OUTPATIENT)
Dept: ENDOCRINOLOGY | Age: 78
End: 2018-01-17

## 2018-01-17 DIAGNOSIS — E55.9 VITAMIN D DEFICIENCY: ICD-10-CM

## 2018-01-17 DIAGNOSIS — R80.9 MICROALBUMINURIA: ICD-10-CM

## 2018-01-17 DIAGNOSIS — N40.0 BENIGN PROSTATIC HYPERPLASIA WITHOUT LOWER URINARY TRACT SYMPTOMS: ICD-10-CM

## 2018-01-17 DIAGNOSIS — E78.5 HYPERLIPIDEMIA, UNSPECIFIED HYPERLIPIDEMIA TYPE: ICD-10-CM

## 2018-01-17 DIAGNOSIS — E29.1 HYPOGONADISM IN MALE: ICD-10-CM

## 2018-01-17 DIAGNOSIS — IMO0002 UNCONTROLLED TYPE 2 DIABETES MELLITUS WITH COMPLICATION, WITH LONG-TERM CURRENT USE OF INSULIN: ICD-10-CM

## 2018-01-17 DIAGNOSIS — I10 BENIGN ESSENTIAL HYPERTENSION: ICD-10-CM

## 2018-01-17 DIAGNOSIS — IMO0002 UNCONTROLLED TYPE 2 DIABETES MELLITUS WITH OTHER SPECIFIED COMPLICATION: ICD-10-CM

## 2018-01-20 LAB
25(OH)D3+25(OH)D2 SERPL-MCNC: 61.3 NG/ML (ref 30–100)
ALBUMIN SERPL-MCNC: 4.1 G/DL (ref 3.5–5.2)
ALBUMIN/GLOB SERPL: 1.5 G/DL
ALP SERPL-CCNC: 54 U/L (ref 39–117)
ALT SERPL-CCNC: 15 U/L (ref 1–41)
AST SERPL-CCNC: 14 U/L (ref 1–40)
BILIRUB SERPL-MCNC: 0.3 MG/DL (ref 0.1–1.2)
BUN SERPL-MCNC: 21 MG/DL (ref 8–23)
BUN/CREAT SERPL: 17.9 (ref 7–25)
C PEPTIDE SERPL-MCNC: 1.5 NG/ML (ref 1.1–4.4)
CALCIUM SERPL-MCNC: 9.6 MG/DL (ref 8.6–10.5)
CHLORIDE SERPL-SCNC: 103 MMOL/L (ref 98–107)
CHOLEST SERPL-MCNC: 121 MG/DL (ref 0–200)
CO2 SERPL-SCNC: 29.8 MMOL/L (ref 22–29)
CREAT SERPL-MCNC: 1.17 MG/DL (ref 0.76–1.27)
GLOBULIN SER CALC-MCNC: 2.8 GM/DL
GLUCOSE SERPL-MCNC: 116 MG/DL (ref 65–99)
HBA1C MFR BLD: 8.63 % (ref 4.8–5.6)
HCT VFR BLD AUTO: 44.4 % (ref 40.4–52.2)
HDLC SERPL-MCNC: 36 MG/DL (ref 40–60)
HGB BLD-MCNC: 13.7 G/DL (ref 13.7–17.6)
INTERPRETATION: NORMAL
LDLC SERPL CALC-MCNC: 63 MG/DL (ref 0–100)
Lab: NORMAL
MICROALBUMIN UR-MCNC: 4.4 UG/ML
POTASSIUM SERPL-SCNC: 4.6 MMOL/L (ref 3.5–5.2)
PROT SERPL-MCNC: 6.9 G/DL (ref 6–8.5)
PSA SERPL-MCNC: 0.5 NG/ML (ref 0–4)
SHBG SERPL-SCNC: 40.8 NMOL/L (ref 19.3–76.4)
SODIUM SERPL-SCNC: 144 MMOL/L (ref 136–145)
TESTOST FREE SERPL-MCNC: 4.3 PG/ML (ref 6.6–18.1)
TESTOST SERPL-MCNC: 224 NG/DL (ref 264–916)
TRIGL SERPL-MCNC: 110 MG/DL (ref 0–150)
VLDLC SERPL CALC-MCNC: 22 MG/DL (ref 5–40)

## 2018-02-03 DIAGNOSIS — E29.1 HYPOGONADISM IN MALE: ICD-10-CM

## 2018-02-03 DIAGNOSIS — E78.5 HYPERLIPIDEMIA, UNSPECIFIED HYPERLIPIDEMIA TYPE: ICD-10-CM

## 2018-02-03 DIAGNOSIS — R80.9 MICROALBUMINURIA: ICD-10-CM

## 2018-02-03 DIAGNOSIS — I10 BENIGN ESSENTIAL HYPERTENSION: ICD-10-CM

## 2018-02-03 DIAGNOSIS — E66.9 ADIPOSITY: ICD-10-CM

## 2018-02-03 DIAGNOSIS — E55.9 VITAMIN D DEFICIENCY: ICD-10-CM

## 2018-02-03 DIAGNOSIS — E11.65 TYPE 2 DIABETES MELLITUS WITH HYPERGLYCEMIA, UNSPECIFIED LONG TERM INSULIN USE STATUS: ICD-10-CM

## 2018-02-03 DIAGNOSIS — N40.0 BENIGN NON-NODULAR PROSTATIC HYPERPLASIA WITHOUT LOWER URINARY TRACT SYMPTOMS: ICD-10-CM

## 2018-02-05 RX ORDER — LINAGLIPTIN 5 MG/1
TABLET, FILM COATED ORAL
Qty: 90 TABLET | OUTPATIENT
Start: 2018-02-05

## 2018-02-05 RX ORDER — CANAGLIFLOZIN 300 MG/1
TABLET, FILM COATED ORAL
Qty: 90 TABLET | OUTPATIENT
Start: 2018-02-05

## 2018-02-05 RX ORDER — EZETIMIBE AND SIMVASTATIN 10; 40 MG/1; MG/1
TABLET ORAL
Qty: 90 TABLET | OUTPATIENT
Start: 2018-02-05

## 2018-02-05 RX ORDER — PIOGLITAZONE HCL AND METFORMIN HCL 850; 15 MG/1; MG/1
TABLET ORAL
Qty: 180 TABLET | OUTPATIENT
Start: 2018-02-05

## 2018-02-26 DIAGNOSIS — I10 BENIGN ESSENTIAL HYPERTENSION: ICD-10-CM

## 2018-02-26 DIAGNOSIS — E66.9 OBESITY, UNSPECIFIED CLASSIFICATION, UNSPECIFIED OBESITY TYPE, UNSPECIFIED WHETHER SERIOUS COMORBIDITY PRESENT: ICD-10-CM

## 2018-02-26 DIAGNOSIS — E55.9 VITAMIN D DEFICIENCY: ICD-10-CM

## 2018-02-26 DIAGNOSIS — R80.9 MICROALBUMINURIA: ICD-10-CM

## 2018-02-26 DIAGNOSIS — E11.65 TYPE 2 DIABETES MELLITUS WITH HYPERGLYCEMIA, UNSPECIFIED LONG TERM INSULIN USE STATUS: ICD-10-CM

## 2018-02-26 DIAGNOSIS — E78.5 HYPERLIPIDEMIA, UNSPECIFIED HYPERLIPIDEMIA TYPE: ICD-10-CM

## 2018-02-26 DIAGNOSIS — E29.1 HYPOGONADISM IN MALE: ICD-10-CM

## 2018-02-26 DIAGNOSIS — N40.0 BENIGN NON-NODULAR PROSTATIC HYPERPLASIA WITHOUT LOWER URINARY TRACT SYMPTOMS: ICD-10-CM

## 2018-02-26 RX ORDER — EZETIMIBE AND SIMVASTATIN 10; 40 MG/1; MG/1
TABLET ORAL
Qty: 30 TABLET | Refills: 0 | Status: SHIPPED | OUTPATIENT
Start: 2018-02-26 | End: 2018-02-28 | Stop reason: SDUPTHER

## 2018-02-26 RX ORDER — CANAGLIFLOZIN 300 MG/1
300 TABLET, FILM COATED ORAL DAILY
Qty: 30 TABLET | Refills: 0 | Status: SHIPPED | OUTPATIENT
Start: 2018-02-26 | End: 2018-02-28 | Stop reason: SDUPTHER

## 2018-02-26 RX ORDER — PIOGLITAZONE HCL AND METFORMIN HCL 850; 15 MG/1; MG/1
1 TABLET ORAL 2 TIMES DAILY
Qty: 60 TABLET | Refills: 0 | Status: SHIPPED | OUTPATIENT
Start: 2018-02-26 | End: 2018-02-28 | Stop reason: SDUPTHER

## 2018-02-26 RX ORDER — ERGOCALCIFEROL 1.25 MG/1
CAPSULE ORAL
Qty: 8 CAPSULE | Refills: 0 | Status: SHIPPED | OUTPATIENT
Start: 2018-02-26 | End: 2018-02-28 | Stop reason: SDUPTHER

## 2018-02-26 RX ORDER — LANCETS
EACH MISCELLANEOUS
Qty: 100 EACH | Refills: 0 | Status: SHIPPED | OUTPATIENT
Start: 2018-02-26 | End: 2018-02-28 | Stop reason: SDUPTHER

## 2018-02-27 ENCOUNTER — OFFICE VISIT (OUTPATIENT)
Dept: ENDOCRINOLOGY | Age: 78
End: 2018-02-27

## 2018-02-27 VITALS
HEIGHT: 72 IN | BODY MASS INDEX: 36.3 KG/M2 | DIASTOLIC BLOOD PRESSURE: 68 MMHG | SYSTOLIC BLOOD PRESSURE: 112 MMHG | WEIGHT: 268 LBS

## 2018-02-27 DIAGNOSIS — I10 BENIGN ESSENTIAL HYPERTENSION: ICD-10-CM

## 2018-02-27 DIAGNOSIS — R00.0 TACHYCARDIA: Primary | ICD-10-CM

## 2018-02-27 DIAGNOSIS — I10 ESSENTIAL HYPERTENSION: ICD-10-CM

## 2018-02-27 DIAGNOSIS — I49.9 IRREGULAR HEART RATE: ICD-10-CM

## 2018-02-27 DIAGNOSIS — E55.9 VITAMIN D DEFICIENCY: ICD-10-CM

## 2018-02-27 DIAGNOSIS — IMO0002 UNCONTROLLED TYPE 2 DIABETES MELLITUS WITH COMPLICATION, WITH LONG-TERM CURRENT USE OF INSULIN: ICD-10-CM

## 2018-02-27 DIAGNOSIS — E78.5 ELEVATED LIPIDS: ICD-10-CM

## 2018-02-27 DIAGNOSIS — I49.9 IRREGULAR CARDIAC RHYTHM: ICD-10-CM

## 2018-02-27 DIAGNOSIS — E29.1 HYPOGONADISM IN MALE: ICD-10-CM

## 2018-02-27 DIAGNOSIS — E78.5 HYPERLIPIDEMIA, UNSPECIFIED HYPERLIPIDEMIA TYPE: ICD-10-CM

## 2018-02-27 PROCEDURE — 99214 OFFICE O/P EST MOD 30 MIN: CPT | Performed by: NURSE PRACTITIONER

## 2018-02-27 NOTE — PATIENT INSTRUCTIONS
Check bs's twice daily and include morning and afternoon and evening readings  Increase exercise   Decrease sweets in diet  Referral to cardiology

## 2018-02-27 NOTE — PROGRESS NOTES
"Subjective   Noel Garcia is a 77 y.o. male is here today for follow-up.  Chief Complaint   Patient presents with   • Diabetes     recent labs, pt tests BG 1x daily , pt brought meter   • Hypogonadism   • Hyperlipidemia   • Hypertension   • Vitamin D Deficiency   • microalbuminuria     /68  Ht 182.9 cm (72\")  Wt 122 kg (268 lb)  BMI 36.35 kg/m2  Current Outpatient Prescriptions on File Prior to Visit   Medication Sig   • ACCU-CHEK ROSETTA test strip Check bs's 3 times daily   • ACCU-CHEK FASTCLIX LANCETS misc Check 3 times daily   • aspirin 325 MG tablet Take 1 tablet by mouth daily.   • ergocalciferol (ERGOCALCIFEROL) 07097 units capsule Take 1 capsule twice weekly   • ezetimibe-simvastatin (VYTORIN) 10-40 MG per tablet Take 1 po daily   • Insulin Glargine 300 UNIT/ML solution pen-injector Inject 140 Units under the skin Every Morning. (Patient taking differently: Inject 130 Units under the skin Every Morning.)   • Insulin Pen Needle 31G X 5 MM misc 1 Device Daily.   • INVOKANA 300 MG tablet Take 300 mg by mouth Daily.   • linagliptin (TRADJENTA) 5 MG tablet tablet Take 1 tablet by mouth Daily.   • omeprazole (priLOSEC) 20 MG capsule Take 1 capsule by mouth  daily   • pioglitazone-metFORMIN (ACTOPLUS MET)  MG per tablet Take 1 tablet by mouth 2 (Two) Times a Day.   • [DISCONTINUED] cyclobenzaprine (FLEXERIL) 10 MG tablet Take 10 mg by mouth.     No current facility-administered medications on file prior to visit.      Family History   Problem Relation Age of Onset   • Hypertension Father    • Coronary artery disease Father    • Diabetes Father    • Hyperlipidemia Father    • Coronary artery disease Brother      Social History   Substance Use Topics   • Smoking status: Former Smoker   • Smokeless tobacco: None   • Alcohol use Yes      Comment: SOCIAL     Allergies   Allergen Reactions   • Atorvastatin    • Gemfibrozil          History of Present Illness   Encounter Diagnoses   Name Primary?   • " Tachycardia Yes   • Irregular heart rate    • Irregular cardiac rhythm    • Benign essential hypertension    • Hyperlipidemia, unspecified hyperlipidemia type    • Essential hypertension    • Vitamin D deficiency    • Hypogonadism in male    • Uncontrolled type 2 diabetes mellitus with complication, with long-term current use of insulin    • Elevated lipids      This is a 77-year-old male patient here today for routine follow-up visit.  He is being seen for the above-mentioned problems.  He had recent labs which were reviewed and he was provided a copy.  He was noted to be tachycardic with a irregular heart rate at today's visit.  He does not have a cardiologist.  EKG was done at today's visit.  He denies any chest pain or shortness of breath.  His hemoglobin A1c is significantly higher than previous.  He states he has not been following his diet and has been eating chocolate [place] kisses daily.  He also is eating cakes and pies but not every day.  He states he is addicted to the [place] kisses.  He denies any hypoglycemic events.  His blood sugars are checked typically in the morning and his morning readings are an satisfactory range.  I've advised him to check his blood sugars later in the day and evening to see how high his blood sugars are getting.  I've asked that he let the officethat we can make additional adjustments based on his blood sugars    The following portions of the patient's history were reviewed and updated as appropriate: allergies, current medications, past family history, past medical history, past social history, past surgical history and problem list.    Review of Systems   Constitutional: Negative for fatigue.   HENT: Negative for trouble swallowing.    Eyes: Negative for visual disturbance.   Respiratory: Negative for shortness of breath.    Cardiovascular: Negative for leg swelling.   Endocrine: Negative for polyuria.   Skin: Negative for wound.   Neurological: Negative for numbness.        Objective   Physical Exam   Constitutional: He is oriented to person, place, and time. He appears well-developed and well-nourished. No distress.   HENT:   Head: Normocephalic and atraumatic.   Right Ear: External ear normal.   Left Ear: External ear normal.   Nose: Nose normal.   Eyes: Pupils are equal, round, and reactive to light. Right eye exhibits no discharge. Left eye exhibits no discharge.   Neck: Normal range of motion. Neck supple. Carotid bruit is not present. No tracheal deviation, no edema and no erythema present. No thyromegaly present.   Cardiovascular: Normal rate and intact distal pulses.  Exam reveals no gallop and no friction rub.    No murmur heard.  Tachy, irregular   Pulmonary/Chest: Effort normal and breath sounds normal. No respiratory distress. He has no wheezes. He has no rales.   Abdominal: Soft. Bowel sounds are normal. He exhibits no distension. There is no tenderness.   Musculoskeletal: Normal range of motion. He exhibits no edema or deformity.   Lymphadenopathy:     He has no cervical adenopathy.   Neurological: He is alert and oriented to person, place, and time. Coordination normal.   Skin: Skin is warm and dry. No rash noted. He is not diaphoretic. No erythema. No pallor.   Psychiatric: He has a normal mood and affect. His behavior is normal. Judgment and thought content normal.   Nursing note and vitals reviewed.    Lab on 01/17/2018   Component Date Value Ref Range Status   • Glucose 01/17/2018 116* 65 - 99 mg/dL Final   • BUN 01/17/2018 21  8 - 23 mg/dL Final   • Creatinine 01/17/2018 1.17  0.76 - 1.27 mg/dL Final   • eGFR Non African Am 01/17/2018 60* >60 mL/min/1.73 Final    Comment: The MDRD GFR formula is only valid for adults with stable  renal function between ages 18 and 70.     • eGFR  Am 01/17/2018 73  >60 mL/min/1.73 Final   • BUN/Creatinine Ratio 01/17/2018 17.9  7.0 - 25.0 Final   • Sodium 01/17/2018 144  136 - 145 mmol/L Final   • Potassium 01/17/2018  4.6  3.5 - 5.2 mmol/L Final   • Chloride 01/17/2018 103  98 - 107 mmol/L Final   • Total CO2 01/17/2018 29.8* 22.0 - 29.0 mmol/L Final   • Calcium 01/17/2018 9.6  8.6 - 10.5 mg/dL Final   • Total Protein 01/17/2018 6.9  6.0 - 8.5 g/dL Final   • Albumin 01/17/2018 4.10  3.50 - 5.20 g/dL Final   • Globulin 01/17/2018 2.8  gm/dL Final   • A/G Ratio 01/17/2018 1.5  g/dL Final   • Total Bilirubin 01/17/2018 0.3  0.1 - 1.2 mg/dL Final   • Alkaline Phosphatase 01/17/2018 54  39 - 117 U/L Final   • AST (SGOT) 01/17/2018 14  1 - 40 U/L Final   • ALT (SGPT) 01/17/2018 15  1 - 41 U/L Final   • C-Peptide 01/17/2018 1.5  1.1 - 4.4 ng/mL Final    C-Peptide reference interval is for fasting patients.   • Hemoglobin A1C 01/17/2018 8.63* 4.80 - 5.60 % Final    Comment: Hemoglobin A1C Ranges:  Increased Risk for Diabetes  5.7% to 6.4%  Diabetes                     >= 6.5%  Diabetic Goal                < 7.0%     • Total Cholesterol 01/17/2018 121  0 - 200 mg/dL Final   • Triglycerides 01/17/2018 110  0 - 150 mg/dL Final   • HDL Cholesterol 01/17/2018 36* 40 - 60 mg/dL Final   • VLDL Cholesterol 01/17/2018 22  5 - 40 mg/dL Final   • LDL Cholesterol  01/17/2018 63  0 - 100 mg/dL Final   • Hemoglobin 01/17/2018 13.7  13.7 - 17.6 g/dL Final   • Hematocrit 01/17/2018 44.4  40.4 - 52.2 % Final   • PSA 01/17/2018 0.500  0.000 - 4.000 ng/mL Final   • Testosterone, Total 01/17/2018 224* 264 - 916 ng/dL Final    Comment: Adult male reference interval is based on a population of  healthy nonobese males (BMI <30) between 19 and 39 years old.  Travison, et.al. JCEM 2017,102;3743-2566. PMID: [id number].     • Testosterone, Free 01/17/2018 4.3* 6.6 - 18.1 pg/mL Final   • Sex Hormone Binding Globulin 01/17/2018 40.8  19.3 - 76.4 nmol/L Final   • 25 Hydroxy, Vitamin D 01/17/2018 61.3  30.0 - 100.0 ng/mL Final    Comment: Reference Range for Total Vitamin D 25(OH)  Deficiency    <20.0 ng/mL  Insufficiency 21-29 ng/mL  Sufficiency     ng/mL  Toxicity      >100 ng/ml        • Microalbumin, Urine 01/17/2018 4.4  Not Estab. ug/mL Final   • Interpretation 01/17/2018 Note   Final    Supplemental report is available.   • PDF Image 01/17/2018 Not applicable   Final         Assessment/Plan   Problems Addressed this Visit        Cardiovascular and Mediastinum    Benign essential hypertension    Hyperlipidemia    Hypertension       Digestive    Vitamin D deficiency       Endocrine    Hypogonadism in male    Uncontrolled type 2 diabetes mellitus       Other    Elevated lipids      Other Visit Diagnoses     Tachycardia    -  Primary    Relevant Orders    Ambulatory Referral to Cardiology    ECG 12 Lead    Irregular heart rate        Relevant Orders    Ambulatory Referral to Cardiology    ECG 12 Lead    Irregular cardiac rhythm        Relevant Orders    Ambulatory Referral to Cardiology    ECG 12 Lead          In summary, patient was seen and examined.  His recent labs were reviewed and his hemoglobin A1c is not at goal and has gone up significantly since his last visit.  He is tachycardic with a regular rhythm at today's visit.  Bedside EKG shows his rate and rhythm are abnormal.  ekg will be scanned in emr. He does not have a cardiologist will be referred for further evaluation.  Metabolically he is stable.  He is needing refills on his medications.  He does not want to add any medications based on his A1c at this time and that he wants to try diet and weight loss to see if he can get some improvements with those changes.  He is to follow-up in 4 months with labs prior.  I've encouraged him to contact the office should he have any questions or concerns prior to then.  No medications were changed at today's visit however I've asked that he contact the office with blood sugar readings

## 2018-02-28 DIAGNOSIS — N40.0 BENIGN NON-NODULAR PROSTATIC HYPERPLASIA WITHOUT LOWER URINARY TRACT SYMPTOMS: ICD-10-CM

## 2018-02-28 DIAGNOSIS — I10 BENIGN ESSENTIAL HYPERTENSION: ICD-10-CM

## 2018-02-28 DIAGNOSIS — E11.65 TYPE 2 DIABETES MELLITUS WITH HYPERGLYCEMIA, UNSPECIFIED LONG TERM INSULIN USE STATUS: ICD-10-CM

## 2018-02-28 DIAGNOSIS — E78.5 HYPERLIPIDEMIA, UNSPECIFIED HYPERLIPIDEMIA TYPE: ICD-10-CM

## 2018-02-28 DIAGNOSIS — R80.9 MICROALBUMINURIA: ICD-10-CM

## 2018-02-28 DIAGNOSIS — E66.9 OBESITY, UNSPECIFIED CLASSIFICATION, UNSPECIFIED OBESITY TYPE, UNSPECIFIED WHETHER SERIOUS COMORBIDITY PRESENT: ICD-10-CM

## 2018-02-28 DIAGNOSIS — E29.1 HYPOGONADISM IN MALE: ICD-10-CM

## 2018-02-28 DIAGNOSIS — E55.9 VITAMIN D DEFICIENCY: ICD-10-CM

## 2018-02-28 RX ORDER — ERGOCALCIFEROL 1.25 MG/1
CAPSULE ORAL
Qty: 24 CAPSULE | Refills: 1 | Status: SHIPPED | OUTPATIENT
Start: 2018-02-28 | End: 2018-07-13 | Stop reason: SDUPTHER

## 2018-02-28 RX ORDER — PIOGLITAZONE HCL AND METFORMIN HCL 850; 15 MG/1; MG/1
1 TABLET ORAL 2 TIMES DAILY
Qty: 180 TABLET | Refills: 1 | Status: SHIPPED | OUTPATIENT
Start: 2018-02-28 | End: 2018-03-22 | Stop reason: HOSPADM

## 2018-02-28 RX ORDER — LANCETS
EACH MISCELLANEOUS
Qty: 300 EACH | Refills: 1 | Status: SHIPPED | OUTPATIENT
Start: 2018-02-28 | End: 2018-07-13 | Stop reason: SDUPTHER

## 2018-02-28 RX ORDER — EZETIMIBE AND SIMVASTATIN 10; 40 MG/1; MG/1
TABLET ORAL
Qty: 90 TABLET | Refills: 1 | Status: SHIPPED | OUTPATIENT
Start: 2018-02-28 | End: 2018-07-13 | Stop reason: SDUPTHER

## 2018-02-28 RX ORDER — CANAGLIFLOZIN 300 MG/1
300 TABLET, FILM COATED ORAL DAILY
Qty: 90 TABLET | Refills: 1 | Status: SHIPPED | OUTPATIENT
Start: 2018-02-28 | End: 2018-03-22 | Stop reason: HOSPADM

## 2018-03-18 PROCEDURE — 99284 EMERGENCY DEPT VISIT MOD MDM: CPT

## 2018-03-19 ENCOUNTER — APPOINTMENT (OUTPATIENT)
Dept: CARDIOLOGY | Facility: HOSPITAL | Age: 78
End: 2018-03-19
Attending: INTERNAL MEDICINE

## 2018-03-19 ENCOUNTER — HOSPITAL ENCOUNTER (INPATIENT)
Facility: HOSPITAL | Age: 78
LOS: 3 days | Discharge: HOME | End: 2018-03-22
Attending: EMERGENCY MEDICINE | Admitting: INTERNAL MEDICINE

## 2018-03-19 ENCOUNTER — APPOINTMENT (OUTPATIENT)
Dept: GENERAL RADIOLOGY | Facility: HOSPITAL | Age: 78
End: 2018-03-19

## 2018-03-19 ENCOUNTER — APPOINTMENT (OUTPATIENT)
Dept: CT IMAGING | Facility: HOSPITAL | Age: 78
End: 2018-03-19

## 2018-03-19 DIAGNOSIS — I50.9 ACUTE CONGESTIVE HEART FAILURE, UNSPECIFIED CONGESTIVE HEART FAILURE TYPE: ICD-10-CM

## 2018-03-19 DIAGNOSIS — J96.01 ACUTE RESPIRATORY FAILURE WITH HYPOXIA (HCC): ICD-10-CM

## 2018-03-19 DIAGNOSIS — I48.91 NEW ONSET ATRIAL FIBRILLATION (HCC): Primary | ICD-10-CM

## 2018-03-19 LAB
ALBUMIN SERPL-MCNC: 4 G/DL (ref 3.5–5.2)
ALBUMIN SERPL-MCNC: 4.1 G/DL (ref 3.5–5.2)
ALBUMIN/GLOB SERPL: 1.6 G/DL
ALBUMIN/GLOB SERPL: 1.6 G/DL
ALP SERPL-CCNC: 47 U/L (ref 39–117)
ALP SERPL-CCNC: 50 U/L (ref 39–117)
ALT SERPL W P-5'-P-CCNC: 16 U/L (ref 1–41)
ALT SERPL W P-5'-P-CCNC: 16 U/L (ref 1–41)
ANION GAP SERPL CALCULATED.3IONS-SCNC: 11.8 MMOL/L
ANION GAP SERPL CALCULATED.3IONS-SCNC: 7.5 MMOL/L
AORTIC ARCH: 2.6 CM
APTT PPP: 27.7 SECONDS (ref 22.7–35.4)
ASCENDING AORTA: 3.2 CM
AST SERPL-CCNC: 15 U/L (ref 1–40)
AST SERPL-CCNC: 16 U/L (ref 1–40)
B PERT DNA SPEC QL NAA+PROBE: NOT DETECTED
BASOPHILS # BLD AUTO: 0.04 10*3/MM3 (ref 0–0.2)
BASOPHILS NFR BLD AUTO: 0.8 % (ref 0–1.5)
BH CV ECHO MEAS - ACS: 1.8 CM
BH CV ECHO MEAS - AO MEAN PG (FULL): 0 MMHG
BH CV ECHO MEAS - AO MEAN PG: 1 MMHG
BH CV ECHO MEAS - AO ROOT AREA (BSA CORRECTED): 1.5
BH CV ECHO MEAS - AO ROOT AREA: 9.6 CM^2
BH CV ECHO MEAS - AO ROOT DIAM: 3.5 CM
BH CV ECHO MEAS - AO V2 MAX: 83 CM/SEC
BH CV ECHO MEAS - AO V2 MEAN: 54.1 CM/SEC
BH CV ECHO MEAS - AO V2 VTI: 11.4 CM
BH CV ECHO MEAS - ASC AORTA: 3.2 CM
BH CV ECHO MEAS - AVA(I,A): 4 CM^2
BH CV ECHO MEAS - AVA(I,D): 4 CM^2
BH CV ECHO MEAS - BSA(HAYCOCK): 2.5 M^2
BH CV ECHO MEAS - BSA: 2.4 M^2
BH CV ECHO MEAS - BZI_BMI: 35.8 KILOGRAMS/M^2
BH CV ECHO MEAS - BZI_METRIC_HEIGHT: 182.9 CM
BH CV ECHO MEAS - BZI_METRIC_WEIGHT: 119.8 KG
BH CV ECHO MEAS - CONTRAST EF (2CH): 37.9 ML/M^2
BH CV ECHO MEAS - CONTRAST EF 4CH: 40.1 ML/M^2
BH CV ECHO MEAS - EDV(CUBED): 274.6 ML
BH CV ECHO MEAS - EDV(MOD-SP2): 132 ML
BH CV ECHO MEAS - EDV(MOD-SP4): 172 ML
BH CV ECHO MEAS - EDV(TEICH): 216 ML
BH CV ECHO MEAS - EF(CUBED): 54.5 %
BH CV ECHO MEAS - EF(MOD-SP2): 37.9 %
BH CV ECHO MEAS - EF(MOD-SP4): 40.1 %
BH CV ECHO MEAS - EF(TEICH): 45.3 %
BH CV ECHO MEAS - ESV(CUBED): 125 ML
BH CV ECHO MEAS - ESV(MOD-SP2): 82 ML
BH CV ECHO MEAS - ESV(MOD-SP4): 103 ML
BH CV ECHO MEAS - ESV(TEICH): 118.2 ML
BH CV ECHO MEAS - FS: 23.1 %
BH CV ECHO MEAS - IVS/LVPW: 1
BH CV ECHO MEAS - IVSD: 1.4 CM
BH CV ECHO MEAS - LA DIMENSION: 4.7 CM
BH CV ECHO MEAS - LA/AO: 1.3
BH CV ECHO MEAS - LAT PEAK E' VEL: 11 CM/SEC
BH CV ECHO MEAS - LV DIASTOLIC VOL/BSA (35-75): 71.8 ML/M^2
BH CV ECHO MEAS - LV MASS(C)D: 441.3 GRAMS
BH CV ECHO MEAS - LV MASS(C)DI: 184.1 GRAMS/M^2
BH CV ECHO MEAS - LV MEAN PG: 1 MMHG
BH CV ECHO MEAS - LV SYSTOLIC VOL/BSA (12-30): 43 ML/M^2
BH CV ECHO MEAS - LV V1 MAX: 57 CM/SEC
BH CV ECHO MEAS - LV V1 MEAN: 44.7 CM/SEC
BH CV ECHO MEAS - LV V1 VTI: 11.1 CM
BH CV ECHO MEAS - LVIDD: 6.5 CM
BH CV ECHO MEAS - LVIDS: 5 CM
BH CV ECHO MEAS - LVLD AP2: 8 CM
BH CV ECHO MEAS - LVLD AP4: 8.6 CM
BH CV ECHO MEAS - LVLS AP2: 7.8 CM
BH CV ECHO MEAS - LVLS AP4: 8.4 CM
BH CV ECHO MEAS - LVOT AREA (M): 4.2 CM^2
BH CV ECHO MEAS - LVOT AREA: 4.2 CM^2
BH CV ECHO MEAS - LVOT DIAM: 2.3 CM
BH CV ECHO MEAS - LVPWD: 1.4 CM
BH CV ECHO MEAS - MED PEAK E' VEL: 15 CM/SEC
BH CV ECHO MEAS - MR MAX PG: 76.4 MMHG
BH CV ECHO MEAS - MR MAX VEL: 437 CM/SEC
BH CV ECHO MEAS - MV DEC SLOPE: 880 CM/SEC^2
BH CV ECHO MEAS - MV DEC TIME: 0.11 SEC
BH CV ECHO MEAS - MV E MAX VEL: 102 CM/SEC
BH CV ECHO MEAS - MV MEAN PG: 4 MMHG
BH CV ECHO MEAS - MV P1/2T MAX VEL: 112 CM/SEC
BH CV ECHO MEAS - MV P1/2T: 37.3 MSEC
BH CV ECHO MEAS - MV V2 MEAN: 88.7 CM/SEC
BH CV ECHO MEAS - MV V2 VTI: 16.9 CM
BH CV ECHO MEAS - MVA P1/2T LCG: 2 CM^2
BH CV ECHO MEAS - MVA(P1/2T): 5.9 CM^2
BH CV ECHO MEAS - MVA(VTI): 2.7 CM^2
BH CV ECHO MEAS - PA ACC SLOPE: 0 CM/SEC^2
BH CV ECHO MEAS - PA ACC TIME: 0.07 SEC
BH CV ECHO MEAS - PA MAX PG (FULL): 0.6 MMHG
BH CV ECHO MEAS - PA MAX PG: 1.4 MMHG
BH CV ECHO MEAS - PA PR(ACCEL): 45.7 MMHG
BH CV ECHO MEAS - PA V2 MAX: 58.4 CM/SEC
BH CV ECHO MEAS - PVA(V,A): 4 CM^2
BH CV ECHO MEAS - PVA(V,D): 4 CM^2
BH CV ECHO MEAS - QP/QS: 0.89
BH CV ECHO MEAS - RAP SYSTOLE: 8 MMHG
BH CV ECHO MEAS - RV MAX PG: 0.76 MMHG
BH CV ECHO MEAS - RV MEAN PG: 0 MMHG
BH CV ECHO MEAS - RV V1 MAX: 43.6 CM/SEC
BH CV ECHO MEAS - RV V1 MEAN: 32.8 CM/SEC
BH CV ECHO MEAS - RV V1 VTI: 7.7 CM
BH CV ECHO MEAS - RVOT AREA: 5.3 CM^2
BH CV ECHO MEAS - RVOT DIAM: 2.6 CM
BH CV ECHO MEAS - RVSP: 38 MMHG
BH CV ECHO MEAS - SI(AO): 45.8 ML/M^2
BH CV ECHO MEAS - SI(CUBED): 62.4 ML/M^2
BH CV ECHO MEAS - SI(LVOT): 19.2 ML/M^2
BH CV ECHO MEAS - SI(MOD-SP2): 20.9 ML/M^2
BH CV ECHO MEAS - SI(MOD-SP4): 28.8 ML/M^2
BH CV ECHO MEAS - SI(TEICH): 40.8 ML/M^2
BH CV ECHO MEAS - SUP REN AO DIAM: 1.95 CM
BH CV ECHO MEAS - SV(AO): 109.7 ML
BH CV ECHO MEAS - SV(CUBED): 149.6 ML
BH CV ECHO MEAS - SV(LVOT): 46.1 ML
BH CV ECHO MEAS - SV(MOD-SP2): 50 ML
BH CV ECHO MEAS - SV(MOD-SP4): 69 ML
BH CV ECHO MEAS - SV(RVOT): 40.9 ML
BH CV ECHO MEAS - SV(TEICH): 97.8 ML
BH CV ECHO MEAS - TAPSE (>1.6): 3.2 CM2
BH CV ECHO MEAS - TR MAX VEL: 276 CM/SEC
BH CV VAS BP RIGHT ARM: NORMAL MMHG
BH CV XLRA - RV BASE: 2.2 CM
BH CV XLRA - TDI S': 8.97 CM/SEC
BILIRUB SERPL-MCNC: 0.4 MG/DL (ref 0.1–1.2)
BILIRUB SERPL-MCNC: 0.5 MG/DL (ref 0.1–1.2)
BUN BLD-MCNC: 22 MG/DL (ref 8–23)
BUN BLD-MCNC: 22 MG/DL (ref 8–23)
BUN/CREAT SERPL: 17.7 (ref 7–25)
BUN/CREAT SERPL: 18 (ref 7–25)
C PNEUM DNA NPH QL NAA+NON-PROBE: NOT DETECTED
CALCIUM SPEC-SCNC: 9.4 MG/DL (ref 8.6–10.5)
CALCIUM SPEC-SCNC: 9.4 MG/DL (ref 8.6–10.5)
CHLORIDE SERPL-SCNC: 97 MMOL/L (ref 98–107)
CHLORIDE SERPL-SCNC: 99 MMOL/L (ref 98–107)
CO2 SERPL-SCNC: 31.5 MMOL/L (ref 22–29)
CO2 SERPL-SCNC: 32.2 MMOL/L (ref 22–29)
CREAT BLD-MCNC: 1.22 MG/DL (ref 0.76–1.27)
CREAT BLD-MCNC: 1.24 MG/DL (ref 0.76–1.27)
DEPRECATED RDW RBC AUTO: 55.1 FL (ref 37–54)
E/E' RATIO: 13
EOSINOPHIL # BLD AUTO: 0.16 10*3/MM3 (ref 0–0.7)
EOSINOPHIL NFR BLD AUTO: 3 % (ref 0.3–6.2)
ERYTHROCYTE [DISTWIDTH] IN BLOOD BY AUTOMATED COUNT: 16.3 % (ref 11.5–14.5)
FLUAV H1 2009 PAND RNA NPH QL NAA+PROBE: NOT DETECTED
FLUAV H1 HA GENE NPH QL NAA+PROBE: NOT DETECTED
FLUAV H3 RNA NPH QL NAA+PROBE: NOT DETECTED
FLUAV SUBTYP SPEC NAA+PROBE: NOT DETECTED
FLUBV RNA ISLT QL NAA+PROBE: NOT DETECTED
GFR SERPL CREATININE-BSD FRML MDRD: 56 ML/MIN/1.73
GFR SERPL CREATININE-BSD FRML MDRD: 57 ML/MIN/1.73
GLOBULIN UR ELPH-MCNC: 2.5 GM/DL
GLOBULIN UR ELPH-MCNC: 2.6 GM/DL
GLUCOSE BLD-MCNC: 166 MG/DL (ref 65–99)
GLUCOSE BLD-MCNC: 180 MG/DL (ref 65–99)
GLUCOSE BLDC GLUCOMTR-MCNC: 121 MG/DL (ref 70–130)
GLUCOSE BLDC GLUCOMTR-MCNC: 129 MG/DL (ref 70–130)
GLUCOSE BLDC GLUCOMTR-MCNC: 142 MG/DL (ref 70–130)
GLUCOSE BLDC GLUCOMTR-MCNC: 169 MG/DL (ref 70–130)
HADV DNA SPEC NAA+PROBE: NOT DETECTED
HCOV 229E RNA SPEC QL NAA+PROBE: NOT DETECTED
HCOV HKU1 RNA SPEC QL NAA+PROBE: NOT DETECTED
HCOV NL63 RNA SPEC QL NAA+PROBE: NOT DETECTED
HCOV OC43 RNA SPEC QL NAA+PROBE: NOT DETECTED
HCT VFR BLD AUTO: 41.1 % (ref 40.4–52.2)
HGB BLD-MCNC: 12.3 G/DL (ref 13.7–17.6)
HMPV RNA NPH QL NAA+NON-PROBE: NOT DETECTED
HPIV1 RNA SPEC QL NAA+PROBE: NOT DETECTED
HPIV2 RNA SPEC QL NAA+PROBE: NOT DETECTED
HPIV3 RNA NPH QL NAA+PROBE: NOT DETECTED
HPIV4 P GENE NPH QL NAA+PROBE: NOT DETECTED
IMM GRANULOCYTES # BLD: 0.02 10*3/MM3 (ref 0–0.03)
IMM GRANULOCYTES NFR BLD: 0.4 % (ref 0–0.5)
INR PPP: 1.15 (ref 0.9–1.1)
LEFT ATRIUM VOLUME INDEX: 28 ML/M2
LYMPHOCYTES # BLD AUTO: 1.27 10*3/MM3 (ref 0.9–4.8)
LYMPHOCYTES NFR BLD AUTO: 24.2 % (ref 19.6–45.3)
M PNEUMO IGG SER IA-ACNC: NOT DETECTED
MAGNESIUM SERPL-MCNC: 2.3 MG/DL (ref 1.6–2.4)
MAXIMAL PREDICTED HEART RATE: 141 BPM
MCH RBC QN AUTO: 27.5 PG (ref 27–32.7)
MCHC RBC AUTO-ENTMCNC: 29.9 G/DL (ref 32.6–36.4)
MCV RBC AUTO: 91.7 FL (ref 79.8–96.2)
MONOCYTES # BLD AUTO: 0.53 10*3/MM3 (ref 0.2–1.2)
MONOCYTES NFR BLD AUTO: 10.1 % (ref 5–12)
NEUTROPHILS # BLD AUTO: 3.23 10*3/MM3 (ref 1.9–8.1)
NEUTROPHILS NFR BLD AUTO: 61.5 % (ref 42.7–76)
NT-PROBNP SERPL-MCNC: 1281 PG/ML (ref 0–1800)
PLATELET # BLD AUTO: 171 10*3/MM3 (ref 140–500)
PMV BLD AUTO: 11.1 FL (ref 6–12)
POTASSIUM BLD-SCNC: 4.1 MMOL/L (ref 3.5–5.2)
POTASSIUM BLD-SCNC: 4.6 MMOL/L (ref 3.5–5.2)
PROT SERPL-MCNC: 6.5 G/DL (ref 6–8.5)
PROT SERPL-MCNC: 6.7 G/DL (ref 6–8.5)
PROTHROMBIN TIME: 14.5 SECONDS (ref 11.7–14.2)
RBC # BLD AUTO: 4.48 10*6/MM3 (ref 4.6–6)
RHINOVIRUS RNA SPEC NAA+PROBE: NOT DETECTED
RSV RNA NPH QL NAA+NON-PROBE: NOT DETECTED
SODIUM BLD-SCNC: 138 MMOL/L (ref 136–145)
SODIUM BLD-SCNC: 141 MMOL/L (ref 136–145)
STRESS TARGET HR: 120 BPM
TROPONIN T SERPL-MCNC: <0.01 NG/ML (ref 0–0.03)
TSH SERPL DL<=0.05 MIU/L-ACNC: 1.88 MIU/ML (ref 0.27–4.2)
WBC NRBC COR # BLD: 5.25 10*3/MM3 (ref 4.5–10.7)

## 2018-03-19 PROCEDURE — 25010000002 ENOXAPARIN PER 10 MG: Performed by: INTERNAL MEDICINE

## 2018-03-19 PROCEDURE — 25010000002 PERFLUTREN (DEFINITY) 8.476 MG IN SODIUM CHLORIDE 0.9 % 10 ML INJECTION: Performed by: INTERNAL MEDICINE

## 2018-03-19 PROCEDURE — 87633 RESP VIRUS 12-25 TARGETS: CPT | Performed by: HOSPITALIST

## 2018-03-19 PROCEDURE — 25010000002 FUROSEMIDE PER 20 MG: Performed by: INTERNAL MEDICINE

## 2018-03-19 PROCEDURE — 84484 ASSAY OF TROPONIN QUANT: CPT | Performed by: INTERNAL MEDICINE

## 2018-03-19 PROCEDURE — 85730 THROMBOPLASTIN TIME PARTIAL: CPT | Performed by: EMERGENCY MEDICINE

## 2018-03-19 PROCEDURE — 87486 CHLMYD PNEUM DNA AMP PROBE: CPT | Performed by: HOSPITALIST

## 2018-03-19 PROCEDURE — 25010000002 AZITHROMYCIN PER 500 MG: Performed by: HOSPITALIST

## 2018-03-19 PROCEDURE — 0 IOPAMIDOL PER 1 ML: Performed by: EMERGENCY MEDICINE

## 2018-03-19 PROCEDURE — 93010 ELECTROCARDIOGRAM REPORT: CPT | Performed by: INTERNAL MEDICINE

## 2018-03-19 PROCEDURE — 84443 ASSAY THYROID STIM HORMONE: CPT | Performed by: NURSE PRACTITIONER

## 2018-03-19 PROCEDURE — 93306 TTE W/DOPPLER COMPLETE: CPT | Performed by: INTERNAL MEDICINE

## 2018-03-19 PROCEDURE — 80053 COMPREHEN METABOLIC PANEL: CPT | Performed by: EMERGENCY MEDICINE

## 2018-03-19 PROCEDURE — 85025 COMPLETE CBC W/AUTO DIFF WBC: CPT | Performed by: EMERGENCY MEDICINE

## 2018-03-19 PROCEDURE — 80053 COMPREHEN METABOLIC PANEL: CPT | Performed by: NURSE PRACTITIONER

## 2018-03-19 PROCEDURE — 84484 ASSAY OF TROPONIN QUANT: CPT | Performed by: NURSE PRACTITIONER

## 2018-03-19 PROCEDURE — 93005 ELECTROCARDIOGRAM TRACING: CPT | Performed by: INTERNAL MEDICINE

## 2018-03-19 PROCEDURE — 85610 PROTHROMBIN TIME: CPT | Performed by: EMERGENCY MEDICINE

## 2018-03-19 PROCEDURE — 87205 SMEAR GRAM STAIN: CPT | Performed by: HOSPITALIST

## 2018-03-19 PROCEDURE — 83880 ASSAY OF NATRIURETIC PEPTIDE: CPT | Performed by: EMERGENCY MEDICINE

## 2018-03-19 PROCEDURE — 82962 GLUCOSE BLOOD TEST: CPT

## 2018-03-19 PROCEDURE — 99222 1ST HOSP IP/OBS MODERATE 55: CPT | Performed by: INTERNAL MEDICINE

## 2018-03-19 PROCEDURE — 87581 M.PNEUMON DNA AMP PROBE: CPT | Performed by: HOSPITALIST

## 2018-03-19 PROCEDURE — 87070 CULTURE OTHR SPECIMN AEROBIC: CPT | Performed by: HOSPITALIST

## 2018-03-19 PROCEDURE — 71045 X-RAY EXAM CHEST 1 VIEW: CPT

## 2018-03-19 PROCEDURE — 93306 TTE W/DOPPLER COMPLETE: CPT

## 2018-03-19 PROCEDURE — 87798 DETECT AGENT NOS DNA AMP: CPT | Performed by: HOSPITALIST

## 2018-03-19 PROCEDURE — 93005 ELECTROCARDIOGRAM TRACING: CPT | Performed by: EMERGENCY MEDICINE

## 2018-03-19 PROCEDURE — 84484 ASSAY OF TROPONIN QUANT: CPT | Performed by: EMERGENCY MEDICINE

## 2018-03-19 PROCEDURE — 93005 ELECTROCARDIOGRAM TRACING: CPT | Performed by: NURSE PRACTITIONER

## 2018-03-19 PROCEDURE — 25010000002 FUROSEMIDE PER 20 MG: Performed by: EMERGENCY MEDICINE

## 2018-03-19 PROCEDURE — 83735 ASSAY OF MAGNESIUM: CPT | Performed by: NURSE PRACTITIONER

## 2018-03-19 PROCEDURE — 71275 CT ANGIOGRAPHY CHEST: CPT

## 2018-03-19 PROCEDURE — 25010000002 CEFTRIAXONE PER 250 MG: Performed by: HOSPITALIST

## 2018-03-19 RX ORDER — NICOTINE POLACRILEX 4 MG
15 LOZENGE BUCCAL
Status: DISCONTINUED | OUTPATIENT
Start: 2018-03-19 | End: 2018-03-22 | Stop reason: HOSPADM

## 2018-03-19 RX ORDER — POTASSIUM CHLORIDE 7.45 MG/ML
10 INJECTION INTRAVENOUS
Status: DISCONTINUED | OUTPATIENT
Start: 2018-03-19 | End: 2018-03-22 | Stop reason: HOSPADM

## 2018-03-19 RX ORDER — POTASSIUM CHLORIDE 750 MG/1
40 CAPSULE, EXTENDED RELEASE ORAL AS NEEDED
Status: DISCONTINUED | OUTPATIENT
Start: 2018-03-19 | End: 2018-03-22 | Stop reason: HOSPADM

## 2018-03-19 RX ORDER — METOPROLOL TARTRATE 50 MG/1
50 TABLET, FILM COATED ORAL EVERY 12 HOURS SCHEDULED
Status: DISCONTINUED | OUTPATIENT
Start: 2018-03-19 | End: 2018-03-19

## 2018-03-19 RX ORDER — IPRATROPIUM BROMIDE AND ALBUTEROL SULFATE 2.5; .5 MG/3ML; MG/3ML
3 SOLUTION RESPIRATORY (INHALATION) EVERY 4 HOURS PRN
Status: DISCONTINUED | OUTPATIENT
Start: 2018-03-19 | End: 2018-03-22 | Stop reason: HOSPADM

## 2018-03-19 RX ORDER — POTASSIUM CHLORIDE 1.5 G/1.77G
40 POWDER, FOR SOLUTION ORAL AS NEEDED
Status: DISCONTINUED | OUTPATIENT
Start: 2018-03-19 | End: 2018-03-22 | Stop reason: HOSPADM

## 2018-03-19 RX ORDER — SODIUM CHLORIDE 0.9 % (FLUSH) 0.9 %
10 SYRINGE (ML) INJECTION AS NEEDED
Status: DISCONTINUED | OUTPATIENT
Start: 2018-03-19 | End: 2018-03-22 | Stop reason: HOSPADM

## 2018-03-19 RX ORDER — IPRATROPIUM BROMIDE AND ALBUTEROL SULFATE 2.5; .5 MG/3ML; MG/3ML
3 SOLUTION RESPIRATORY (INHALATION)
Status: DISCONTINUED | OUTPATIENT
Start: 2018-03-19 | End: 2018-03-19

## 2018-03-19 RX ORDER — NITROGLYCERIN 0.4 MG/1
0.4 TABLET SUBLINGUAL
Status: DISCONTINUED | OUTPATIENT
Start: 2018-03-19 | End: 2018-03-22 | Stop reason: HOSPADM

## 2018-03-19 RX ORDER — MELATONIN
5000 DAILY
Status: DISCONTINUED | OUTPATIENT
Start: 2018-03-19 | End: 2018-03-22 | Stop reason: HOSPADM

## 2018-03-19 RX ORDER — MAGNESIUM SULFATE HEPTAHYDRATE 40 MG/ML
2 INJECTION, SOLUTION INTRAVENOUS AS NEEDED
Status: DISCONTINUED | OUTPATIENT
Start: 2018-03-19 | End: 2018-03-22 | Stop reason: HOSPADM

## 2018-03-19 RX ORDER — ASPIRIN 325 MG
325 TABLET ORAL DAILY
Status: DISCONTINUED | OUTPATIENT
Start: 2018-03-19 | End: 2018-03-21

## 2018-03-19 RX ORDER — PANTOPRAZOLE SODIUM 40 MG/1
40 TABLET, DELAYED RELEASE ORAL
Status: DISCONTINUED | OUTPATIENT
Start: 2018-03-19 | End: 2018-03-22 | Stop reason: HOSPADM

## 2018-03-19 RX ORDER — FUROSEMIDE 10 MG/ML
40 INJECTION INTRAMUSCULAR; INTRAVENOUS ONCE
Status: COMPLETED | OUTPATIENT
Start: 2018-03-19 | End: 2018-03-19

## 2018-03-19 RX ORDER — GUAIFENESIN 600 MG/1
600 TABLET, EXTENDED RELEASE ORAL 2 TIMES DAILY
Status: DISCONTINUED | OUTPATIENT
Start: 2018-03-19 | End: 2018-03-22 | Stop reason: HOSPADM

## 2018-03-19 RX ORDER — PIOGLITAZONEHYDROCHLORIDE 15 MG/1
15 TABLET ORAL 2 TIMES DAILY WITH MEALS
Status: DISCONTINUED | OUTPATIENT
Start: 2018-03-19 | End: 2018-03-19

## 2018-03-19 RX ORDER — SIMVASTATIN 40 MG
40 TABLET ORAL NIGHTLY
Status: DISCONTINUED | OUTPATIENT
Start: 2018-03-19 | End: 2018-03-19

## 2018-03-19 RX ORDER — METOPROLOL TARTRATE 50 MG/1
50 TABLET, FILM COATED ORAL EVERY 12 HOURS SCHEDULED
Status: DISCONTINUED | OUTPATIENT
Start: 2018-03-19 | End: 2018-03-22 | Stop reason: HOSPADM

## 2018-03-19 RX ORDER — EZETIMIBE AND SIMVASTATIN 10; 40 MG/1; MG/1
TABLET ORAL NIGHTLY
Status: DISCONTINUED | OUTPATIENT
Start: 2018-03-19 | End: 2018-03-22 | Stop reason: HOSPADM

## 2018-03-19 RX ORDER — DEXTROSE MONOHYDRATE 25 G/50ML
25 INJECTION, SOLUTION INTRAVENOUS
Status: DISCONTINUED | OUTPATIENT
Start: 2018-03-19 | End: 2018-03-22 | Stop reason: HOSPADM

## 2018-03-19 RX ORDER — SODIUM CHLORIDE 0.9 % (FLUSH) 0.9 %
1-10 SYRINGE (ML) INJECTION AS NEEDED
Status: DISCONTINUED | OUTPATIENT
Start: 2018-03-19 | End: 2018-03-22 | Stop reason: HOSPADM

## 2018-03-19 RX ORDER — FUROSEMIDE 10 MG/ML
40 INJECTION INTRAMUSCULAR; INTRAVENOUS EVERY 12 HOURS
Status: DISCONTINUED | OUTPATIENT
Start: 2018-03-19 | End: 2018-03-21

## 2018-03-19 RX ORDER — ACETAMINOPHEN 325 MG/1
650 TABLET ORAL EVERY 4 HOURS PRN
Status: DISCONTINUED | OUTPATIENT
Start: 2018-03-19 | End: 2018-03-22 | Stop reason: HOSPADM

## 2018-03-19 RX ORDER — MAGNESIUM SULFATE 1 G/100ML
1 INJECTION INTRAVENOUS AS NEEDED
Status: DISCONTINUED | OUTPATIENT
Start: 2018-03-19 | End: 2018-03-22 | Stop reason: HOSPADM

## 2018-03-19 RX ORDER — ATORVASTATIN CALCIUM 20 MG/1
40 TABLET, FILM COATED ORAL NIGHTLY
Status: DISCONTINUED | OUTPATIENT
Start: 2018-03-19 | End: 2018-03-19

## 2018-03-19 RX ORDER — CEFTRIAXONE SODIUM 1 G/50ML
1 INJECTION, SOLUTION INTRAVENOUS EVERY 24 HOURS
Status: DISCONTINUED | OUTPATIENT
Start: 2018-03-19 | End: 2018-03-22 | Stop reason: HOSPADM

## 2018-03-19 RX ADMIN — VITAMIN D, TAB 1000IU (100/BT) 5000 UNITS: 25 TAB at 10:44

## 2018-03-19 RX ADMIN — PIOGLITAZONE 45 MG: 30 TABLET ORAL at 17:27

## 2018-03-19 RX ADMIN — NITROGLYCERIN 1 INCH: 20 OINTMENT TOPICAL at 06:22

## 2018-03-19 RX ADMIN — ENOXAPARIN SODIUM 120 MG: 120 INJECTION SUBCUTANEOUS at 17:26

## 2018-03-19 RX ADMIN — METOPROLOL TARTRATE 25 MG: 25 TABLET ORAL at 06:20

## 2018-03-19 RX ADMIN — CEFTRIAXONE SODIUM 1 G: 1 INJECTION, SOLUTION INTRAVENOUS at 17:27

## 2018-03-19 RX ADMIN — METOPROLOL TARTRATE 50 MG: 50 TABLET, FILM COATED ORAL at 20:44

## 2018-03-19 RX ADMIN — ENOXAPARIN SODIUM 120 MG: 120 INJECTION SUBCUTANEOUS at 06:22

## 2018-03-19 RX ADMIN — FUROSEMIDE 40 MG: 10 INJECTION, SOLUTION INTRAMUSCULAR; INTRAVENOUS at 17:27

## 2018-03-19 RX ADMIN — PERFLUTREN 2 ML: 6.52 INJECTION, SUSPENSION INTRAVENOUS at 17:32

## 2018-03-19 RX ADMIN — ASPIRIN 325 MG: 325 TABLET ORAL at 10:44

## 2018-03-19 RX ADMIN — PIOGLITAZONE 15 MG: 15 TABLET ORAL at 10:44

## 2018-03-19 RX ADMIN — EZETIMIBE AND SIMVASTATIN 1 TABLET: 10; 40 TABLET ORAL at 20:44

## 2018-03-19 RX ADMIN — METFORMIN HYDROCHLORIDE 850 MG: 850 TABLET, FILM COATED ORAL at 10:44

## 2018-03-19 RX ADMIN — AZITHROMYCIN 500 MG: 500 INJECTION, POWDER, LYOPHILIZED, FOR SOLUTION INTRAVENOUS at 18:06

## 2018-03-19 RX ADMIN — GUAIFENESIN 600 MG: 600 TABLET, EXTENDED RELEASE ORAL at 20:44

## 2018-03-19 RX ADMIN — FUROSEMIDE 40 MG: 10 INJECTION, SOLUTION INTRAMUSCULAR; INTRAVENOUS at 02:53

## 2018-03-19 RX ADMIN — FUROSEMIDE 40 MG: 10 INJECTION, SOLUTION INTRAMUSCULAR; INTRAVENOUS at 06:22

## 2018-03-19 RX ADMIN — METOPROLOL TARTRATE 25 MG: 25 TABLET ORAL at 13:33

## 2018-03-19 RX ADMIN — PANTOPRAZOLE SODIUM 40 MG: 40 TABLET, DELAYED RELEASE ORAL at 10:44

## 2018-03-19 RX ADMIN — IOPAMIDOL 95 ML: 755 INJECTION, SOLUTION INTRAVENOUS at 01:51

## 2018-03-19 NOTE — CONSULTS
"Internal medicine consults    Referring physician  Dr. GOTTI    Primary care physician  Dr. Encarnacion    Reason for consult  Follow medical problem    History of present illness  77-year-old white male with history of diabetes hypertension hyperlipidemia COPD BPH gastroesophageal reflux disease admitted through emergency room to cardiology service with shortness of breath for one week.  Patient also have nonproductive cough.  Patient denies any chest pain but has gained about 20-30 pounds over last 2-3 months.  Patient evaluated in ER found to be in rapid atrial fibrillation and congestive heart failure admitted for management.  I'm asked to follow the patient for medical problem.  At the time of interview he still short of breath but no chest pain palpitation.  Patient denies any fever but has nonproductive cough for long time.    PAST MEDICAL HISTORY    • Benign essential hypertension     • Benign prostatic hyperplasia 1/21/2016   • Diabetes mellitus     • Gastroesophageal reflux disease 1/21/2016   • GERD (gastroesophageal reflux disease)     • Hamartoma of lung     • Hyperlipidemia     • Microalbuminuria     • Obesity     • Prostatic hyperplasia     • Type 2 diabetes mellitus     • Vitamin D deficiency        PAST SURGICAL HISTORY  Surgical History         Past Surgical History:   Procedure Laterality Date   • ADENOIDECTOMY       • BACK SURGERY   1994     EXCISION MELANOMA   • COLONOSCOPY Bilateral 01/2016     Dr. Brunner-\"normal\" according to the patient   • HERNIA REPAIR       • LUNG SURGERY Left     • TONSILLECTOMY       • UPPER GASTROINTESTINAL ENDOSCOPY Bilateral 01/2016     Dr. Foss-esophageal dilation according to the patient.         FAMILY HISTORY        Family History   Problem Relation Age of Onset   • Hypertension Father     • Coronary artery disease Father     • Diabetes Father     • Hyperlipidemia Father     • Coronary artery disease Brother        SOCIAL HISTORY  Social History   Social History          " "  Social History   • Marital status:        Spouse name: N/A   • Number of children: N/A   • Years of education: N/A          Occupational History   • Not on file.               Social History Main Topics     • Smoking status: Former Smoker    • Smokeless tobacco: Not on file    • Alcohol use Yes          Comment: SOCIAL     • Drug use: Unknown   • Sexual activity: Not on file       Other Topics Concern   • Not on file          Social History Narrative   • No narrative on file         ALLERGIES  Gemfibrozil and Atorvastatin    Home medications reviewed     REVIEW OF SYSTEMS  Review of Systems   Constitutional: Positive for unexpected weight change. Negative for activity change, appetite change and fever.   HENT: Negative for congestion and sore throat.    Eyes: Negative.    Respiratory: Positive for shortness of breath. Negative for cough.    Cardiovascular: Positive for leg swelling. Negative for chest pain.   Gastrointestinal: Negative for abdominal pain, diarrhea and vomiting.   Endocrine: Negative.    Genitourinary: Negative for decreased urine volume and dysuria.   Musculoskeletal: Negative for neck pain.   Skin: Negative for rash and wound.   Allergic/Immunologic: Negative.    Neurological: Negative for weakness, numbness and headaches.   Hematological: Negative.    Psychiatric/Behavioral: Negative.    All other systems reviewed and are negative.     PHYSICAL EXAM  Blood pressure 118/61, pulse 95, temperature 97.4 °F (36.3 °C), temperature source Oral, resp. rate 24, height 182.9 cm (72\"), weight 120 kg (264 lb 1.6 oz), SpO2 91 %.    Constitutional: He is oriented to person, place, and time. He appears distressed (respiratory).   Head: Normocephalic and atraumatic.   Eyes: EOM are normal. Pupils are equal, round, and reactive to light.   Neck: Normal range of motion. Neck supple.   Cardiovascular: Regular rhythm and normal heart sounds.  Tachycardia present.    Pulmonary/Chest: He is in respiratory " distress (mild). He has rhonchi in the right lower field.   Abdominal: Soft. There is no tenderness. There is no rebound and no guarding.   Musculoskeletal: Normal range of motion. He exhibits edema (2+ pitting, BLE).   Neurological: He is alert and oriented to person, place, and time. He has normal sensation and normal strength.   Skin: Skin is warm and dry.   Psychiatric: Mood and affect normal.     LAB RESULTS  Lab Results (last 24 hours)     Procedure Component Value Units Date/Time    Troponin [[id number]]  (Normal) Collected:  03/19/18 1211    Specimen:  Blood Updated:  03/19/18 1254     Troponin T <0.010 ng/mL     Narrative:       Troponin T Reference Ranges:  Less than 0.03 ng/mL:    Negative for AMI  0.03 to 0.09 ng/mL:      Indeterminant for AMI  Greater than 0.09 ng/mL: Positive for AMI    POC Glucose Once [[id number]]  (Normal) Collected:  03/19/18 1241    Specimen:  Blood Updated:  03/19/18 1243     Glucose 129 mg/dL     Narrative:       Meter: [id number] : [id number] Maryann Stokes PATTI    POC Glucose Once [[id number]]  (Abnormal) Collected:  03/19/18 1053    Specimen:  Blood Updated:  03/19/18 1106     Glucose 169 (H) mg/dL     Narrative:       Meter: [id number] : [id number] Guy Murillo PATTI    TSH [[id number]]  (Normal) Collected:  03/19/18 0921    Specimen:  Blood Updated:  03/19/18 1004     TSH 1.880 mIU/mL     Comprehensive Metabolic Panel [[id number]]  (Abnormal) Collected:  03/19/18 0921    Specimen:  Blood Updated:  03/19/18 0956     Glucose 166 (H) mg/dL      BUN 22 mg/dL      Creatinine 1.24 mg/dL      Sodium 141 mmol/L      Potassium 4.1 mmol/L      Chloride 97 (L) mmol/L      CO2 32.2 (H) mmol/L      Calcium 9.4 mg/dL      Total Protein 6.7 g/dL      Albumin 4.10 g/dL      ALT (SGPT) 16 U/L      AST (SGOT) 15 U/L      Alkaline Phosphatase 50 U/L      Total Bilirubin 0.5 mg/dL      eGFR Non African Amer 56 (L) mL/min/1.73      Globulin 2.6 gm/dL      A/G Ratio 1.6 g/dL      BUN/Creatinine  Ratio 17.7     Anion Gap 11.8 mmol/L     Narrative:       The MDRD GFR formula is only valid for adults with stable renal function between ages 18 and 70.    Magnesium [[id number]]  (Normal) Collected:  03/19/18 0921    Specimen:  Blood Updated:  03/19/18 0956     Magnesium 2.3 mg/dL     Troponin [[id number]]  (Normal) Collected:  03/19/18 0628    Specimen:  Blood Updated:  03/19/18 0731     Troponin T <0.010 ng/mL     Narrative:       Troponin T Reference Ranges:  Less than 0.03 ng/mL:    Negative for AMI  0.03 to 0.09 ng/mL:      Indeterminant for AMI  Greater than 0.09 ng/mL: Positive for AMI    POC Glucose Once [[id number]]  (Normal) Collected:  03/19/18 0548    Specimen:  Blood Updated:  03/19/18 0549     Glucose 121 mg/dL     Narrative:       Meter: [id number] : [id number] [facility] CNA    BNP [[id number]]  (Normal) Collected:  03/19/18 0033    Specimen:  Blood Updated:  03/19/18 0108     proBNP 1,281.0 pg/mL     Narrative:       Among patients with dyspnea, NT-proBNP is highly sensitive for the detection of acute congestive heart failure. In addition NT-proBNP of <300 pg/ml effectively rules out acute congestive heart failure with 99% negative predictive value.    Troponin [[id number]]  (Normal) Collected:  03/19/18 0033    Specimen:  Blood Updated:  03/19/18 0108     Troponin T <0.010 ng/mL     Narrative:       Troponin T Reference Ranges:  Less than 0.03 ng/mL:    Negative for AMI  0.03 to 0.09 ng/mL:      Indeterminant for AMI  Greater than 0.09 ng/mL: Positive for AMI    Comprehensive Metabolic Panel [[id number]]  (Abnormal) Collected:  03/19/18 0033    Specimen:  Blood Updated:  03/19/18 0105     Glucose 180 (H) mg/dL      BUN 22 mg/dL      Creatinine 1.22 mg/dL      Sodium 138 mmol/L      Potassium 4.6 mmol/L      Chloride 99 mmol/L      CO2 31.5 (H) mmol/L      Calcium 9.4 mg/dL      Total Protein 6.5 g/dL      Albumin 4.00 g/dL      ALT (SGPT) 16 U/L      AST (SGOT) 16 U/L      Alkaline  Phosphatase 47 U/L      Total Bilirubin 0.4 mg/dL      eGFR Non African Amer 57 (L) mL/min/1.73      Globulin 2.5 gm/dL      A/G Ratio 1.6 g/dL      BUN/Creatinine Ratio 18.0     Anion Gap 7.5 mmol/L     Narrative:       The MDRD GFR formula is only valid for adults with stable renal function between ages 18 and 70.    Protime-INR [[id number]]  (Abnormal) Collected:  03/19/18 0033    Specimen:  Blood Updated:  03/19/18 0054     Protime 14.5 (H) Seconds      INR 1.15 (H)    aPTT [[id number]]  (Normal) Collected:  03/19/18 0033    Specimen:  Blood Updated:  03/19/18 0054     PTT 27.7 seconds     CBC & Differential [[id number]] Collected:  03/19/18 0033    Specimen:  Blood Updated:  03/19/18 0051    Narrative:       The following orders were created for panel order CBC & Differential.  Procedure                               Abnormality         Status                     ---------                               -----------         ------                     CBC Auto Differential[[id number]]        Abnormal            Final result                 Please view results for these tests on the individual orders.    CBC Auto Differential [[id number]]  (Abnormal) Collected:  03/19/18 0033    Specimen:  Blood Updated:  03/19/18 0051     WBC 5.25 10*3/mm3      RBC 4.48 (L) 10*6/mm3      Hemoglobin 12.3 (L) g/dL      Hematocrit 41.1 %      MCV 91.7 fL      MCH 27.5 pg      MCHC 29.9 (L) g/dL      RDW 16.3 (H) %      RDW-SD 55.1 (H) fl      MPV 11.1 fL      Platelets 171 10*3/mm3      Neutrophil % 61.5 %      Lymphocyte % 24.2 %      Monocyte % 10.1 %      Eosinophil % 3.0 %      Basophil % 0.8 %      Immature Grans % 0.4 %      Neutrophils, Absolute 3.23 10*3/mm3      Lymphocytes, Absolute 1.27 10*3/mm3      Monocytes, Absolute 0.53 10*3/mm3      Eosinophils, Absolute 0.16 10*3/mm3      Basophils, Absolute 0.04 10*3/mm3      Immature Grans, Absolute 0.02 10*3/mm3         Imaging Results (last 24 hours)     Procedure Component Value  Units Date/Time    CT Angiogram Chest With Contrast [[id number]] Collected:  03/19/18 0215     Updated:  03/19/18 0216    Narrative:       CT ANGIOGRAM CHEST WITH CONTRAST.     TECHNIQUE: Radiation dose reduction techniques were utilized, including  automated exposure control and exposure modulation based on body size. A  routine enhanced CTA of the chest was performed, tailored in order to  evaluate the pulmonary arteries. Sagittal and coronal two-dimensional  reformations are provided for review. Three-dimensional reformations are  also provided.     HISTORY: Dyspnea, cardiac origin.     COMPARISON: No prior studies for comparison.     FINDINGS:   No occlusive filling defects are seen in the main pulmonary artery or  its first-degree branches to suggest acute pulmonary embolism.         No mediastinal lymphadenopathy or pericardial effusion. A few  subcentimeter lymph nodes are present.     Small bilateral pleural effusions. Pulmonary congestion. Mild emphysema.     Spiculated nodule in the left upper lobe measures approximately 1 cm,  image 47. A few groundglass nodules are also seen on the left for  example on image 54 measuring 1 cm.      Upper abdominal viscera are essentially unremarkable.       Impression:       1.  No evidence for acute pulmonary embolism.   2.  Findings of congestive heart failure with bilateral small effusions.  3.  1 cm suspected nodule in the left upper lobe, neoplasm is suspected.  If available comparison with old studies, otherwise follow-up in 3  months may be performed. Histological sampling may be obtained.  4.  A few groundglass nodules measuring up to 1 cm. If available  comparison with old studies, otherwise follow-up in 3 months may be  performed.             XR Chest 1 View [[id number]] Collected:  03/19/18 0029     Updated:  03/19/18 0029    Narrative:       X-RAY CHEST 1 VIEW.     HISTORY: Shortness of breath.     COMPARISON: No prior studies for comparison.      FINDINGS:  Cardiomediastinal silhouette is within normal limits. Low lung volumes.     Right perihilar opacity. No definite effusion.          Impression:       Right perihilar opacity may represent an infiltrate please clinically  correlate.                    EKG                              Rhythm/Rate: A-flutter 131, PAC's present   P waves and IA: absent   QRS, axis: nml   ST and T waves: nml       Current Facility-Administered Medications:   •  acetaminophen (TYLENOL) tablet 650 mg, 650 mg, Oral, Q4H PRN, Niecy M Strain, APRN  •  aspirin tablet 325 mg, 325 mg, Oral, Daily, Niecy M Strain, APRN, 325 mg at 03/19/18 1044  •  cholecalciferol (VITAMIN D3) tablet 5,000 Units, 5,000 Units, Oral, Daily, Niecy M Strain, APRN, 5,000 Units at 03/19/18 1044  •  dextrose (D50W) solution 25 g, 25 g, Intravenous, Q15 Min PRN, Can Carr MD  •  dextrose (GLUTOSE) oral gel 15 g, 15 g, Oral, Q15 Min PRN, Can Carr MD  •  enoxaparin (LOVENOX) syringe 120 mg, 1 mg/kg, Subcutaneous, Q12H, Can Carr MD, 120 mg at 03/19/18 0622  •  furosemide (LASIX) injection 40 mg, 40 mg, Intravenous, Q12H, Can Carr MD, 40 mg at 03/19/18 0622  •  glucagon (human recombinant) (GLUCAGEN DIAGNOSTIC) injection 1 mg, 1 mg, Subcutaneous, PRN, Can Carr MD  •  guaiFENesin (MUCINEX) 12 hr tablet 600 mg, 600 mg, Oral, BID, Godfrey North MD  •  insulin aspart (novoLOG) injection 0-7 Units, 0-7 Units, Subcutaneous, 4x Daily With Meals & Nightly, Can Carr MD  •  ipratropium-albuterol (DUO-NEB) nebulizer solution 3 mL, 3 mL, Nebulization, Q4H PRN, Godfrey North MD  •  Magnesium Sulfate 2 gram infusion - Mg less than or equal to 1.5 mg/dL, 2 g, Intravenous, PRN **OR** Magesium Sulfate 1 gram infusion - Mg 1.6-1.9 mg/dL, 1 g, Intravenous, PRN, EMILY Adams  •  metoprolol tartrate (LOPRESSOR) tablet 50 mg, 50 mg, Oral, Q12H, EMILY Adams  •  nitroglycerin (NITROSTAT) SL tablet 0.4 mg,  0.4 mg, Sublingual, Q5 Min PRN, Niecy Arguelles, APRN  •  pantoprazole (PROTONIX) EC tablet 40 mg, 40 mg, Oral, Q AM, Niecy Arguelles APRN, 40 mg at 03/19/18 1044  •  pioglitazone (ACTOS) tablet 45 mg, 45 mg, Oral, BID With Meals **AND** [DISCONTINUED] metFORMIN (GLUCOPHAGE) tablet 850 mg, 850 mg, Oral, BID With Meals, Niecy Arguelles APRN, 850 mg at 03/19/18 1044  •  potassium chloride (MICRO-K) CR capsule 40 mEq, 40 mEq, Oral, PRN **OR** potassium chloride (KLOR-CON) packet 40 mEq, 40 mEq, Oral, PRN **OR** potassium chloride 10 mEq in 100 mL IVPB, 10 mEq, Intravenous, Q1H PRN, Niecy Arguelles, APRN  •  simvastatin (ZOCOR) tablet 40 mg, 40 mg, Oral, Nightly, Niecy Arguelles, APRN  •  sodium chloride 0.9 % flush 1-10 mL, 1-10 mL, Intravenous, PRN, Niecy Arguelles, APRN  •  Insert peripheral IV, , , Once **AND** sodium chloride 0.9 % flush 10 mL, 10 mL, Intravenous, PRN, Mayo Glez MD     ASSESSMENT  Right perihilar pneumonia community acquired infectious  COPD  New onset A. fib with rapid ventricular rate  Acute hypoxic respiratory failure  New-onset congestive heart failure  Diabetes mellitus  Hypertension  Hyperlipidemia  Obesity  Gastroesophageal reflux disease  BPH    PLAN  Agree with current care  Start supplement oxygen nebulizer IV antibiotics  Check wide panel  Admission next  Stop Glucophage and increase Actos  Accu-Chek sliding scale insulin  Diuresis per cardiology  Stress ulcer DVT prophylaxis  Check hemoglobin A1c TSH lipid profile  Supportive care  Will follow with Dr. GOTTI further recommendation according to hospital course    ANDREW PACHECO MD

## 2018-03-19 NOTE — CONSULTS
Referring Provider: Dr. Carr  Reason for Consultation: Dyspnea    Patient Care Team:  Peyman Encarnacion MD as PCP - General    Chief complaint:   Shortness of breath    History of present illness:    This is a 77 year male patient, former heavy smoker (55+ packs year) and  who presented for dyspnea. It started suddenly on Saturday night, while laying in bed and got worse gradually on Sunday until he got to the ED. It's aggravated by laying down and on minimal exertion. No associated wheezing, cough, chest pain, palpitation, fever or legs swelling. At baseline, he has no dyspnea on exertion. No preceding upper respiratory infection or sick contact. No prior diagnosis of COPD and does not use inhaler at baseline.    Pulmonary nodule:  He reported a history of left pulmonary nodule diagnosed >20 years ago for which he underwent resection here at [facility] and it turned out to be benign tumor. He does not recall the name of the surgeon.He has no prior CT of the chest in the last 10 years.     Labs:  Viral PCR --ve; Bicar=32; WBC=5    Data:  Echo 3/19/18:  EF = 40%  Left atrial cavity size is mild-to-moderately dilated.    Review of Systems  Constitutional: No fever or chills.   ENMT: No sinus congestion. Loud snoring  Cardiovascular: No chest pain, palpitation or legs swelling.    Respiratory: Dyspnea but no cough or wheezing.  Gastrointestinal: No constipation, diarrhea or abdominal pain   Neurology: No headache, weakness, numbness or dizziness.   Musculoskeletal: No joints pain, stiffness or swelling.   Psychiatry: No depression.  Lymphatic: No swollen glands.  Integumentary: No rash.    History  Past Medical History:   Diagnosis Date   • Benign essential hypertension    • Benign prostatic hyperplasia 1/21/2016   • Diabetes mellitus    • Gastroesophageal reflux disease 1/21/2016   • GERD (gastroesophageal reflux disease)    • Hamartoma of lung    • Hyperlipidemia    • Microalbuminuria   "  • Obesity    • Prostatic hyperplasia    • Type 2 diabetes mellitus    • Vitamin D deficiency    ,   Past Surgical History:   Procedure Laterality Date   • ADENOIDECTOMY     • BACK SURGERY  1994    EXCISION MELANOMA   • COLONOSCOPY Bilateral 01/2016    Dr. Brunner-\"normal\" according to the patient   • HERNIA REPAIR     • LUNG SURGERY Left    • TONSILLECTOMY     • UPPER GASTROINTESTINAL ENDOSCOPY Bilateral 01/2016    Dr. Foss-esophageal dilation according to the patient.   ,   Family History   Problem Relation Age of Onset   • Hypertension Father    • Coronary artery disease Father    • Diabetes Father    • Hyperlipidemia Father    • Coronary artery disease Brother    ,   Social History   Substance Use Topics   • Smoking status: Former Smoker   • Smokeless tobacco: Not on file   • Alcohol use Yes      Comment: SOCIAL   ,   Prescriptions Prior to Admission   Medication Sig Dispense Refill Last Dose   • ACCU-CHEK ROSETTA test strip Check bs's 3 times daily 300 each 1    • ACCU-CHEK FASTCLIX LANCETS misc Check 3 times daily 300 each 1    • aspirin 325 MG tablet Take 1 tablet by mouth daily.   Taking   • ergocalciferol (ERGOCALCIFEROL) 57651 units capsule Take 1 capsule twice weekly 24 capsule 1    • ezetimibe-simvastatin (VYTORIN) 10-40 MG per tablet Take 1 po daily 90 tablet 1    • Insulin Glargine 300 UNIT/ML solution pen-injector Inject 140 Units under the skin Every Morning. 45 mL 1    • Insulin Pen Needle 31G X 5 MM misc 1 Device Daily. 100 each 1    • INVOKANA 300 MG tablet Take 300 mg by mouth Daily. 90 tablet 1    • linagliptin (TRADJENTA) 5 MG tablet tablet Take 1 tablet by mouth Daily. 90 tablet 1    • omeprazole (priLOSEC) 20 MG capsule Take 1 capsule by mouth  daily 90 capsule 1 Taking   • pioglitazone-metFORMIN (ACTOPLUS MET)  MG per tablet Take 1 tablet by mouth 2 (Two) Times a Day. 180 tablet 1    , Scheduled Meds:    aspirin 325 mg Oral Daily   azithromycin 500 mg Intravenous Q24H   ceftriaxone 1 " g Intravenous Q24H   cholecalciferol 5,000 Units Oral Daily   enoxaparin 1 mg/kg Subcutaneous Q12H   ezetimibe-simvastatin  Oral Nightly   furosemide 40 mg Intravenous Q12H   guaiFENesin 600 mg Oral BID   insulin aspart 0-7 Units Subcutaneous 4x Daily With Meals & Nightly   metoprolol tartrate 50 mg Oral Q12H   pantoprazole 40 mg Oral Q AM   pioglitazone 45 mg Oral BID With Meals   , Continuous Infusions:    and Allergies:  Gemfibrozil and Atorvastatin    Objective     Vital Signs   Temp:  [97.4 °F (36.3 °C)-98.2 °F (36.8 °C)] 97.4 °F (36.3 °C)  Heart Rate:  [] 95  Resp:  [16-26] 24  BP: (105-155)/(61-96) 143/78    Physical Exam:  Constitutional: Not in acute distress.  Eyes: Injected conjunctiva, EOMI.  ENMT: [place] 3. No oral thrush.   Heart: RRR, no murmur  Lungs: Mild wheezing in upper lobes. Coarse and diminished breath sounds bilaterally. Left thoracotomy scar         Abdomen: Obese. Soft. No tenderness or dullness.  Extremities: No cyanosis, clubbing but trace pitting edema: . Moves all extremities.  Neuro: Conscious, alert, oriented x3  Psych: Appropriate mood and affect.    Integumentary: No rash  Lymphatic: No palpable cervical or supraclavicular lymph nodes.            Diagnostic imaging:  I personally and independently reviewed the following images:   Bilateral pleural effusion. DREW pulmonary nodules, 1 cm. Bilateral GG nodules/opacities             Assessment and Plan:  1. Acute Systolic CHF EF 40%   2. Atrial fibrillation/flutter  3. Acute hypoxic respiratory failure, 2ary to #1 and #4  4. Likely COPD, unclear if he has acute exacerbation  5. DREW pulmonary nodule, 1 cm  6. Bilateral pleural effusion  7. Mild pulmonary hypertension per echo (RVSP=35-45 mmHg), [facility]  8. Prior history of benign lung tumor, s/p resection many years ago    · Agree with current bronchodilators. Would need an outpatient PFT.  · No evidence of pneumonia but may have bronchitis. Consider  stopping Rocephin. Continue Azithromycin for bronchitis. Symptoms appear to be predominantly related to cardiac decompensation since he does not have productive cough. Consider gentle diuresis.   · Would definitely benefit from outpatient PSG  · Needs repeat CT chest in 3 months. He's at royce risk of lung cancer. I expressed my concerns about the DREW nodule. This could potentially be benign, (scar secondary to previous resection ?)    I discussed the patients findings and my recommendations with patient    Cindy Robertson MD  03/19/18  7:25 PM

## 2018-03-19 NOTE — ED PROVIDER NOTES
EMERGENCY DEPARTMENT ENCOUNTER    CHIEF COMPLAINT  Chief Complaint: Shortness of air  History given by: patient   History limited by: n/a  Room Number: 2225/1  PMD: MD Dr Bruno Sommers, cardiology.    HPI:  Pt is a 77 y.o. male who presents complaining of SOA that has began about 1 week ago, and began to worsened last night. Pt states that SOA is worsened by exertion. Pt also complains of leg swelling and weigh gain of about 20-30 lbs over the past 2 months. RA O2 sats were 82% on arrival to the ED. Pt states that he is not on O2 at home. Pt reports a hx of diabetes, but denies cardiac or pulmonary hx.     Duration:  1 week   Onset: gradual  Timing: constant   Location: respiratory   Radiation: n/a  Quality: SOA   Intensity/Severity: moderate  Progression: worsening for the past 24 hours   Associated Symptoms: leg swelling, weight gain  Aggravating Factors: exertion  Alleviating Factors: none  Previous Episodes: none  Treatment before arrival: none    PAST MEDICAL HISTORY  Active Ambulatory Problems     Diagnosis Date Noted   • Vitamin D deficiency 01/21/2016   • Benign essential hypertension 01/21/2016   • Diabetes mellitus 01/21/2016   • Gastroesophageal reflux disease 01/21/2016   • Hyperlipidemia 01/21/2016   • Hypogonadism in male 01/21/2016   • Microalbuminuria 01/21/2016   • Adiposity 01/21/2016   • Benign prostatic hyperplasia 01/21/2016   • Uncontrolled type 2 diabetes mellitus 01/21/2016   • Cerebrovascular accident 04/09/2012   • Elevated lipids 04/09/2012   • 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   • Hypertension 04/09/2012   • Malignant melanoma 04/09/2012     Resolved Ambulatory Problems     Diagnosis Date Noted   • No Resolved Ambulatory Problems     Past Medical History:   Diagnosis Date   • Benign essential hypertension    • Benign prostatic hyperplasia 1/21/2016   • Diabetes mellitus    • Gastroesophageal reflux disease 1/21/2016   • GERD (gastroesophageal reflux disease)    • Hamartoma of lung   "  • Hyperlipidemia    • Microalbuminuria    • Obesity    • Prostatic hyperplasia    • Type 2 diabetes mellitus    • Vitamin D deficiency        PAST SURGICAL HISTORY  Past Surgical History:   Procedure Laterality Date   • ADENOIDECTOMY     • BACK SURGERY  1994    EXCISION MELANOMA   • COLONOSCOPY Bilateral 01/2016    Dr. Brunner-\"normal\" according to the patient   • HERNIA REPAIR     • LUNG SURGERY Left    • TONSILLECTOMY     • UPPER GASTROINTESTINAL ENDOSCOPY Bilateral 01/2016    Dr. Foss-esophageal dilation according to the patient.       FAMILY HISTORY  Family History   Problem Relation Age of Onset   • Hypertension Father    • Coronary artery disease Father    • Diabetes Father    • Hyperlipidemia Father    • Coronary artery disease Brother        SOCIAL HISTORY  Social History     Social History   • Marital status:      Spouse name: N/A   • Number of children: N/A   • Years of education: N/A     Occupational History   • Not on file.     Social History Main Topics   • Smoking status: Former Smoker   • Smokeless tobacco: Not on file   • Alcohol use Yes      Comment: SOCIAL   • Drug use: Unknown   • Sexual activity: Not on file     Other Topics Concern   • Not on file     Social History Narrative   • No narrative on file       ALLERGIES  Gemfibrozil and Atorvastatin    REVIEW OF SYSTEMS  Review of Systems   Constitutional: Positive for unexpected weight change. Negative for activity change, appetite change and fever.   HENT: Negative for congestion and sore throat.    Eyes: Negative.    Respiratory: Positive for shortness of breath. Negative for cough.    Cardiovascular: Positive for leg swelling. Negative for chest pain.   Gastrointestinal: Negative for abdominal pain, diarrhea and vomiting.   Endocrine: Negative.    Genitourinary: Negative for decreased urine volume and dysuria.   Musculoskeletal: Negative for neck pain.   Skin: Negative for rash and wound.   Allergic/Immunologic: Negative.  "   Neurological: Negative for weakness, numbness and headaches.   Hematological: Negative.    Psychiatric/Behavioral: Negative.    All other systems reviewed and are negative.      PHYSICAL EXAM  ED Triage Vitals   Temp Pulse Resp BP SpO2   -- -- -- -- --      Temp src Heart Rate Source Patient Position BP Location FiO2 (%)   -- -- -- -- --       Physical Exam   Constitutional: He is oriented to person, place, and time. He appears distressed (respiratory).   HENT:   Head: Normocephalic and atraumatic.   Eyes: EOM are normal. Pupils are equal, round, and reactive to light.   Neck: Normal range of motion. Neck supple.   Cardiovascular: Regular rhythm and normal heart sounds.  Tachycardia present.    Pulmonary/Chest: He is in respiratory distress (mild). He has rhonchi in the right lower field.   Abdominal: Soft. There is no tenderness. There is no rebound and no guarding.   Musculoskeletal: Normal range of motion. He exhibits edema (2+ pitting, BLE).   Neurological: He is alert and oriented to person, place, and time. He has normal sensation and normal strength.   Skin: Skin is warm and dry.   Psychiatric: Mood and affect normal.   Nursing note and vitals reviewed.      LAB RESULTS  Lab Results (last 24 hours)     Procedure Component Value Units Date/Time    CBC & Differential [[id number]] Collected:  03/19/18 0033    Specimen:  Blood Updated:  03/19/18 0051    Narrative:       The following orders were created for panel order CBC & Differential.  Procedure                               Abnormality         Status                     ---------                               -----------         ------                     CBC Auto Differential[[id number]]        Abnormal            Final result                 Please view results for these tests on the individual orders.    Comprehensive Metabolic Panel [[id number]]  (Abnormal) Collected:  03/19/18 0033    Specimen:  Blood Updated:  03/19/18 0105     Glucose 180 (H) mg/dL       BUN 22 mg/dL      Creatinine 1.22 mg/dL      Sodium 138 mmol/L      Potassium 4.6 mmol/L      Chloride 99 mmol/L      CO2 31.5 (H) mmol/L      Calcium 9.4 mg/dL      Total Protein 6.5 g/dL      Albumin 4.00 g/dL      ALT (SGPT) 16 U/L      AST (SGOT) 16 U/L      Alkaline Phosphatase 47 U/L      Total Bilirubin 0.4 mg/dL      eGFR Non African Amer 57 (L) mL/min/1.73      Globulin 2.5 gm/dL      A/G Ratio 1.6 g/dL      BUN/Creatinine Ratio 18.0     Anion Gap 7.5 mmol/L     Narrative:       The MDRD GFR formula is only valid for adults with stable renal function between ages 18 and 70.    Protime-INR [[id number]]  (Abnormal) Collected:  03/19/18 0033    Specimen:  Blood Updated:  03/19/18 0054     Protime 14.5 (H) Seconds      INR 1.15 (H)    aPTT [[id number]]  (Normal) Collected:  03/19/18 0033    Specimen:  Blood Updated:  03/19/18 0054     PTT 27.7 seconds     BNP [[id number]]  (Normal) Collected:  03/19/18 0033    Specimen:  Blood Updated:  03/19/18 0108     proBNP 1,281.0 pg/mL     Narrative:       Among patients with dyspnea, NT-proBNP is highly sensitive for the detection of acute congestive heart failure. In addition NT-proBNP of <300 pg/ml effectively rules out acute congestive heart failure with 99% negative predictive value.    Troponin [[id number]]  (Normal) Collected:  03/19/18 0033    Specimen:  Blood Updated:  03/19/18 0108     Troponin T <0.010 ng/mL     Narrative:       Troponin T Reference Ranges:  Less than 0.03 ng/mL:    Negative for AMI  0.03 to 0.09 ng/mL:      Indeterminant for AMI  Greater than 0.09 ng/mL: Positive for AMI    CBC Auto Differential [[id number]]  (Abnormal) Collected:  03/19/18 0033    Specimen:  Blood Updated:  03/19/18 0051     WBC 5.25 10*3/mm3      RBC 4.48 (L) 10*6/mm3      Hemoglobin 12.3 (L) g/dL      Hematocrit 41.1 %      MCV 91.7 fL      MCH 27.5 pg      MCHC 29.9 (L) g/dL      RDW 16.3 (H) %      RDW-SD 55.1 (H) fl      MPV 11.1 fL      Platelets 171 10*3/mm3       Neutrophil % 61.5 %      Lymphocyte % 24.2 %      Monocyte % 10.1 %      Eosinophil % 3.0 %      Basophil % 0.8 %      Immature Grans % 0.4 %      Neutrophils, Absolute 3.23 10*3/mm3      Lymphocytes, Absolute 1.27 10*3/mm3      Monocytes, Absolute 0.53 10*3/mm3      Eosinophils, Absolute 0.16 10*3/mm3      Basophils, Absolute 0.04 10*3/mm3      Immature Grans, Absolute 0.02 10*3/mm3     POC Glucose Once [[id number]]  (Normal) Collected:  03/19/18 0548    Specimen:  Blood Updated:  03/19/18 0549     Glucose 121 mg/dL     Narrative:       Meter: [id number] : [id number] Alex Gama CNA          I ordered the above labs and reviewed the results    RADIOLOGY  CT Angiogram Chest With Contrast   Preliminary Result   1.  No evidence for acute pulmonary embolism.    2.  Findings of congestive heart failure with bilateral small effusions.   3.  1 cm suspected nodule in the left upper lobe, neoplasm is suspected.   If available comparison with old studies, otherwise follow-up in 3   months may be performed. Histological sampling may be obtained.   4.  A few groundglass nodules measuring up to 1 cm. If available   comparison with old studies, otherwise follow-up in 3 months may be   performed.                  XR Chest 1 View   Preliminary Result   Right perihilar opacity may represent an infiltrate please clinically   correlate.                   I ordered the above noted radiological studies. Interpreted by radiologist. Reviewed by me in PACS.       PROCEDURES  Procedures    EKG           EKG time: 0018  Rhythm/Rate: A-flutter 131, PAC's present   P waves and NJ: absent   QRS, axis: nml   ST and T waves: nml     Interpreted Contemporaneously by me, independently viewed  changed compared to prior 8/2013    PROGRESS AND CONSULTS  ED Course     00:03  EKG ordered    00:05  CMP, PT/INR, APTT, BNP, troponin, CBC, and CXR ordered.    00:09  BP- 149/113 HR- (!) 140 Temp- 98.2 °F (36.8 °C) (Tympanic) O2 sat- (!) 82% on  RA  Advised pt of the plan for labs and CXR. Pt understands and agrees with the plan, all questions answered.    01:14  CTA chest ordered.    01:17  BP- 138/96 HR- (!) 122 Temp- 98.2 °F (36.8 °C) (Tympanic) O2 sat- 93% on 5L  Rechecked the patient who is in NAD and is resting comfortably. Advised pt of the plan for CTA chest for further evaluation of the SOA. Pt understands and agrees with the plan, all questions answered.    02:23  BP- 127/76 HR- (!) 123 (af-fib seen on the monitor) Temp- 98.2 °F (36.8 °C) (Tympanic) O2 sat- 95% on 5L  Rechecked the patient who is in NAD and is resting comfortably. Advised pt and family that the CTA chest shows pleural effusions, indicative of CHF. Informed him of the plan for admission. Pt understands and agrees with the plan, all questions answered.    02:26  Call placed to cardiology for admission. Lasix ordered to treat CHF.     02:46  Discussed pt's case with Dr Abbott (cardiology), who agrees to admit the pt.     MEDICAL DECISION MAKING  Results were reviewed/discussed with the patient and they were also made aware of online access. Pt also made aware that some labs, such as cultures, will not be resulted during ER visit and follow up with PMD is necessary.     MDM  Number of Diagnoses or Management Options  Acute congestive heart failure, unspecified congestive heart failure type:   Acute respiratory failure with hypoxia:   New onset atrial fibrillation:      Amount and/or Complexity of Data Reviewed  Clinical lab tests: ordered and reviewed (TFB=5430. Troponin is <0.010)  Tests in the radiology section of CPT®: ordered and reviewed (CTA chest shows bilateral small effusions. CXR shows a right perihilar opacity)  Tests in the medicine section of CPT®: ordered and reviewed (See EKG procedure note. )  Discuss the patient with other providers: yes (Dr Abbott (cardiology))    Patient Progress  Patient progress: stable         DIAGNOSIS  Final diagnoses:   New onset  atrial fibrillation   Acute congestive heart failure, unspecified congestive heart failure type   Acute respiratory failure with hypoxia       DISPOSITION  ADMISSION    Discussed treatment plan and reason for admission with pt/family and admitting physician.  Pt/family voiced understanding of the plan for admission for further testing/treatment as needed.     Latest Documented Vital Signs:  As of 6:31 AM  BP- 120/74 HR- (!) 130 Temp- 98 °F (36.7 °C) (Oral) O2 sat- 95%    --  Documentation assistance provided by mari Santos for Dr Glez.  Information recorded by the scribe was done at my direction and has been verified and validated by me.       Marcy Santos  03/19/18 0247       Mayo Glez MD  03/19/18 0687

## 2018-03-19 NOTE — PLAN OF CARE
Problem: Patient Care Overview  Goal: Plan of Care Review  Outcome: Ongoing (interventions implemented as appropriate)   03/19/18 4569   Coping/Psychosocial   Plan of Care Reviewed With patient   Plan of Care Review   Progress improving   OTHER   Outcome Summary patient weaned to 3L remains on IV diuretics. IV antibiotics started today. NPO at midnight for a stress test tomorrow. will continue to monitor.      Goal: Individualization and Mutuality  Outcome: Ongoing (interventions implemented as appropriate)    Goal: Discharge Needs Assessment  Outcome: Ongoing (interventions implemented as appropriate)      Problem: Cardiac Output Decreased (Adult)  Goal: Identify Related Risk Factors and Signs and Symptoms  Outcome: Ongoing (interventions implemented as appropriate)    Goal: Effective Tissue Perfusion  Outcome: Ongoing (interventions implemented as appropriate)      Problem: Fluid Volume Excess (Adult)  Goal: Identify Related Risk Factors and Signs and Symptoms  Outcome: Ongoing (interventions implemented as appropriate)    Goal: Optimal Fluid Balance  Outcome: Ongoing (interventions implemented as appropriate)      Problem: Cardiac Rhythm Management Device (Adult)  Goal: Signs and Symptoms of Listed Potential Problems Will be Absent, Minimized or Managed (Cardiac Rhythm Management Device)  Outcome: Ongoing (interventions implemented as appropriate)      Problem: Fall Risk (Adult)  Goal: Identify Related Risk Factors and Signs and Symptoms  Outcome: Ongoing (interventions implemented as appropriate)    Goal: Absence of Fall  Outcome: Ongoing (interventions implemented as appropriate)

## 2018-03-19 NOTE — H&P
[facility]  History & Physical Note                                                                                    Patient Identification:  Noel Garcia:   77 y.o.  male  1940  [id number]            Chief Complaint   Patient presents with   • Shortness of Breath     Shortness of breath X2 days, progressively worsening;        Date of Admission: 3-18-19    Admitting Physician:  Dr Carr    Reason for Admission:New onset atrial fibrillation [I48.91]  Acute respiratory failure with hypoxia [J96.01]  Acute congestive heart failure, unspecified congestive heart failure type [I50.9], Chief Complaint   Patient presents with   • Shortness of Breath     Shortness of breath X2 days, progressively worsening;        History of Present Illness:     This patient is a 77-year-old white female new to our service.  He denies history of MI, congestive heart failure, arrhythmia or previous ischemic workup.  His endocrinologist manages his diabetes and dyslipidemia.  He had a stroke 4-5 years ago with no residual.    He went to see his endocrinologist last week for routine office visit and was found to have an abnormal EKG and was scheduled to be seen in our office as a new patient consult later this week.  He presented to the emergency room last evening reporting a one week history of increasing shortness of breath, development of PND and orthopnea, nonproductive cough, lower extremity swelling.  He also reports a 20-30 pound weight gain over the last 2 months.  He denies any chest pain, tightness, palpitations, dizziness, lightheadedness, near syncope or syncope.  EKG showed atrial fibrillation flutter with RVR.    Admission EKG atrial flutter with RVR, poor R-wave progression.  Ventricular rate currently running in the 100-1:15 range after being started on Lopressor.  Cardiac enzymes negative ×2.  BNP 1281 with evidence of bibasilar atelectasis on chest x-ray.  His chest CT was negative for  "pulmonary emboli however a 1 cm left upper lobe lung nodule and small bilateral pleural effusions were noted    Car diac Risk Factors: Diabetes, hypertension, dyslipidemia    Past Medical History:  Past Medical History:   Diagnosis Date   • Benign essential hypertension    • Benign prostatic hyperplasia 1/21/2016   • Diabetes mellitus    • Gastroesophageal reflux disease 1/21/2016   • GERD (gastroesophageal reflux disease)    • Hamartoma of lung    • Hyperlipidemia    • Microalbuminuria    • Obesity    • Prostatic hyperplasia    • Type 2 diabetes mellitus    • Vitamin D deficiency     at least 3 stable    Past Surgical History:  Past Surgical History:   Procedure Laterality Date   • ADENOIDECTOMY     • BACK SURGERY  1994    EXCISION MELANOMA   • COLONOSCOPY Bilateral 01/2016    Dr. Brunner-\"normal\" according to the patient   • HERNIA REPAIR     • LUNG SURGERY Left    • TONSILLECTOMY     • UPPER GASTROINTESTINAL ENDOSCOPY Bilateral 01/2016    Dr. Foss-esophageal dilation according to the patient.        Social History:   Social History   Substance Use Topics   • Smoking status: Former Smoker   • Smokeless tobacco: Not on file   • Alcohol use Yes      Comment: SOCIAL        Family History:  Family History   Problem Relation Age of Onset   • Hypertension Father    • Coronary artery disease Father    • Diabetes Father    • Hyperlipidemia Father    • Coronary artery disease Brother           Allergies:  Allergies   Allergen Reactions   • Gemfibrozil Unknown (See Comments)     Patient doesn't remember    • Atorvastatin Rash       Home Meds:  No current facility-administered medications on file prior to encounter.      Current Outpatient Prescriptions on File Prior to Encounter   Medication Sig Dispense Refill   • ACCU-CHEK ROSETTA test strip Check bs's 3 times daily 300 each 1   • ACCU-CHEK FASTCLIX LANCETS misc Check 3 times daily 300 each 1   • aspirin 325 MG tablet Take 1 tablet by mouth daily.     • ergocalciferol " (ERGOCALCIFEROL) 84945 units capsule Take 1 capsule twice weekly 24 capsule 1   • ezetimibe-simvastatin (VYTORIN) 10-40 MG per tablet Take 1 po daily 90 tablet 1   • Insulin Glargine 300 UNIT/ML solution pen-injector Inject 140 Units under the skin Every Morning. 45 mL 1   • Insulin Pen Needle 31G X 5 MM misc 1 Device Daily. 100 each 1   • INVOKANA 300 MG tablet Take 300 mg by mouth Daily. 90 tablet 1   • linagliptin (TRADJENTA) 5 MG tablet tablet Take 1 tablet by mouth Daily. 90 tablet 1   • omeprazole (priLOSEC) 20 MG capsule Take 1 capsule by mouth  daily 90 capsule 1   • pioglitazone-metFORMIN (ACTOPLUS MET)  MG per tablet Take 1 tablet by mouth 2 (Two) Times a Day. 180 tablet 1         Scheduled Meds:    enoxaparin 1 mg/kg Q12H   furosemide 40 mg Q12H   insulin aspart 0-7 Units 4x Daily With Meals & Nightly   metoprolol tartrate 25 mg Q12H   nitroglycerin 1 inch Q8H           INTAKE AND OUTPUT:    Intake/Output Summary (Last 24 hours) at 03/19/18 0845  Last data filed at 03/19/18 0802   Gross per 24 hour   Intake              480 ml   Output             2850 ml   Net            -2370 ml           Review of Systems:  Constitutional: No fever, chills   Eyes: No eye pain, vision changes   ENT/oropharynx: No difficulty swallowing, no epistaxis, no changes in hearing   Cardiovascular:  +paroxysmal nocturnal dyspnea, orthopnea.No chest pain, chest tightness, palpitations, diaphoresis, dizziness / syncopal episode   Respiratory: + shortness of breath, dyspnea on exertion, cough, nohemoptysis   Gastrointestinal: No abdominal pain, nausea, vomiting, diarrhea   Genitourinary: No hematuria, dysuria   Neurological: No headache, numbness, one-sided weakness    Musculoskeletal: No cramps, joint pain   Integument: No rash, +LE edema       Constitutional:  Temp:  [97.5 °F (36.4 °C)-98.2 °F (36.8 °C)] 97.5 °F (36.4 °C)  Heart Rate:  [100-140] 100  Resp:  [16-26] 26  BP: (105-155)/(69-96) 118/69    Physical Exam:  General:   Well-developed, well-nourished elderly white male resting quietly.  Appears in no acute distress  Eyes: PERTL,  HEENT:  No JVD. Thyroid not visibly enlarged. No mucosal pallor or cyanosis  Respiratory: Respirations regular and unlabored at rest on supplemental oxygen per nasal cannula.  Mild conversational dyspnea. . BBS with good air entry in all fieldI basilarcrackles, no rubs or wheezes auscultated  Cardiovascular: S1S2 irregular rate and rhythm. No murmur, rubs or gallop. No carotid bruits. DP pulses 2.  1+ lower extremity pitting edema  Gastrointestinal: Abdomen soft, round, non tender. Bowel sounds present. No hepatosplenomegaly.  Musculoskeletal: LEWIS x4. No abnormal movements  Extremities: No digital clubbing or cyanosis  Skin: Color pink. Skin warm and dry to touch.   Neuro: AAO x3 CN II-XII grossly intact                           Cardiographics    Admission ECG:       F/u EKG 3-19-18      Telemetry:        ECHO: Pending       Lab Review:    Results from last 7 days  Lab Units 03/19/18  0628 03/19/18  0033   TROPONIN T ng/mL <0.010 <0.010         Results from last 7 days  Lab Units 03/19/18  0033   SODIUM mmol/L 138   POTASSIUM mmol/L 4.6   BUN mg/dL 22   CREATININE mg/dL 1.22   CALCIUM mg/dL 9.4     Results from last 7 days  Lab Units 03/19/18  0033   WBC 10*3/mm3 5.25   HEMOGLOBIN g/dL 12.3*   HEMATOCRIT % 41.1   PLATELETS 10*3/mm3 171       Results from last 7 days  Lab Units 03/19/18  0033   INR  1.15*   APTT seconds 27.7         CXR IMPRESSION:  Right perihilar opacity may represent an infiltrate please clinically correlate.         Chest CT IMPRESSION:  1.  No evidence for acute pulmonary embolism.   2.  Findings of congestive heart failure with bilateral small effusions.  3.  1 cm suspected nodule in the left upper lobe, neoplasm is suspected. If available comparison with old studies, otherwise follow-up in 3 months may be performed. Histological sampling may be obtained.  4.  A few groundglass  "nodules measuring up to 1 cm. If available comparison with old studies, otherwise follow-up in 3 months may be performed.       Assessment / Plan:    -  A. fib flutter with rvr - newly diagnosed, ?duration  -  heart failure  - SOA  - 1cm DREW pulmonary nodule  - Small bilateral pleural effusions  - CKD- HTN  - DM  - Dyslipidemia   - h/o CVA      Recommendations:   in summary, this is a 77-year-old male with newly diagnosed atrial fibrillation with RVR and subsequent heart failure with fluid overload.  He has a [id number] score of >/= 5 4 which long-term anticoagulation will be recommended.  He has been placed on beta blocker for rate control, full dose Lovenox and IV Lasix.  Monitor serial BNP and renal function.   Will have pulmonology to evaluate abnormal chest CT in this reformed smoker and internal medicine to follow for ongoing medical care including diabetes and dyslipidemia..  MI has been ruled out.  Proceed with stress testing once adequately diuresed      Labs/tests ordered: Medication adjustment, BNP, BMP, ECHO    Can Carr MD  3/19/2018  8:45 AM    EMR Dragon/Transcription:   \"Dictated utilizing Dragon dictation\".     "

## 2018-03-20 ENCOUNTER — APPOINTMENT (OUTPATIENT)
Dept: NUCLEAR MEDICINE | Facility: HOSPITAL | Age: 78
End: 2018-03-20

## 2018-03-20 LAB
ALBUMIN SERPL-MCNC: 4.1 G/DL (ref 3.5–5.2)
ALBUMIN/GLOB SERPL: 1.5 G/DL
ALP SERPL-CCNC: 51 U/L (ref 39–117)
ALT SERPL W P-5'-P-CCNC: 17 U/L (ref 1–41)
ANION GAP SERPL CALCULATED.3IONS-SCNC: 11.4 MMOL/L
AST SERPL-CCNC: 18 U/L (ref 1–40)
BASOPHILS # BLD AUTO: 0.04 10*3/MM3 (ref 0–0.2)
BASOPHILS NFR BLD AUTO: 0.6 % (ref 0–1.5)
BILIRUB SERPL-MCNC: 0.6 MG/DL (ref 0.1–1.2)
BUN BLD-MCNC: 26 MG/DL (ref 8–23)
BUN/CREAT SERPL: 23 (ref 7–25)
CALCIUM SPEC-SCNC: 9.3 MG/DL (ref 8.6–10.5)
CHLORIDE SERPL-SCNC: 95 MMOL/L (ref 98–107)
CHOLEST SERPL-MCNC: 116 MG/DL (ref 0–200)
CO2 SERPL-SCNC: 33.6 MMOL/L (ref 22–29)
CREAT BLD-MCNC: 1.13 MG/DL (ref 0.76–1.27)
DEPRECATED RDW RBC AUTO: 54.2 FL (ref 37–54)
EOSINOPHIL # BLD AUTO: 0.19 10*3/MM3 (ref 0–0.7)
EOSINOPHIL NFR BLD AUTO: 2.8 % (ref 0.3–6.2)
ERYTHROCYTE [DISTWIDTH] IN BLOOD BY AUTOMATED COUNT: 16.3 % (ref 11.5–14.5)
GFR SERPL CREATININE-BSD FRML MDRD: 63 ML/MIN/1.73
GLOBULIN UR ELPH-MCNC: 2.7 GM/DL
GLUCOSE BLD-MCNC: 100 MG/DL (ref 65–99)
GLUCOSE BLDC GLUCOMTR-MCNC: 156 MG/DL (ref 70–130)
GLUCOSE BLDC GLUCOMTR-MCNC: 262 MG/DL (ref 70–130)
GLUCOSE BLDC GLUCOMTR-MCNC: 269 MG/DL (ref 70–130)
GLUCOSE BLDC GLUCOMTR-MCNC: 98 MG/DL (ref 70–130)
GLUCOSE BLDC GLUCOMTR-MCNC: 98 MG/DL (ref 70–130)
HBA1C MFR BLD: 7.32 % (ref 4.8–5.6)
HCT VFR BLD AUTO: 42.2 % (ref 40.4–52.2)
HDLC SERPL-MCNC: 33 MG/DL (ref 40–60)
HGB BLD-MCNC: 12.6 G/DL (ref 13.7–17.6)
IMM GRANULOCYTES # BLD: 0.03 10*3/MM3 (ref 0–0.03)
IMM GRANULOCYTES NFR BLD: 0.4 % (ref 0–0.5)
LDLC SERPL CALC-MCNC: 62 MG/DL (ref 0–100)
LDLC/HDLC SERPL: 1.89 {RATIO}
LYMPHOCYTES # BLD AUTO: 1.5 10*3/MM3 (ref 0.9–4.8)
LYMPHOCYTES NFR BLD AUTO: 21.8 % (ref 19.6–45.3)
MCH RBC QN AUTO: 27.4 PG (ref 27–32.7)
MCHC RBC AUTO-ENTMCNC: 29.9 G/DL (ref 32.6–36.4)
MCV RBC AUTO: 91.7 FL (ref 79.8–96.2)
MONOCYTES # BLD AUTO: 0.78 10*3/MM3 (ref 0.2–1.2)
MONOCYTES NFR BLD AUTO: 11.4 % (ref 5–12)
NEUTROPHILS # BLD AUTO: 4.33 10*3/MM3 (ref 1.9–8.1)
NEUTROPHILS NFR BLD AUTO: 63 % (ref 42.7–76)
NT-PROBNP SERPL-MCNC: 1232 PG/ML (ref 0–1800)
PLATELET # BLD AUTO: 174 10*3/MM3 (ref 140–500)
PMV BLD AUTO: 11.7 FL (ref 6–12)
POTASSIUM BLD-SCNC: 4 MMOL/L (ref 3.5–5.2)
PROT SERPL-MCNC: 6.8 G/DL (ref 6–8.5)
RBC # BLD AUTO: 4.6 10*6/MM3 (ref 4.6–6)
SODIUM BLD-SCNC: 140 MMOL/L (ref 136–145)
TRIGL SERPL-MCNC: 103 MG/DL (ref 0–150)
TROPONIN T SERPL-MCNC: <0.01 NG/ML (ref 0–0.03)
TSH SERPL DL<=0.05 MIU/L-ACNC: 1.25 MIU/ML (ref 0.27–4.2)
VLDLC SERPL-MCNC: 20.6 MG/DL (ref 5–40)
WBC NRBC COR # BLD: 6.87 10*3/MM3 (ref 4.5–10.7)

## 2018-03-20 PROCEDURE — 84484 ASSAY OF TROPONIN QUANT: CPT | Performed by: NURSE PRACTITIONER

## 2018-03-20 PROCEDURE — 93010 ELECTROCARDIOGRAM REPORT: CPT | Performed by: INTERNAL MEDICINE

## 2018-03-20 PROCEDURE — 78452 HT MUSCLE IMAGE SPECT MULT: CPT

## 2018-03-20 PROCEDURE — 99232 SBSQ HOSP IP/OBS MODERATE 35: CPT | Performed by: INTERNAL MEDICINE

## 2018-03-20 PROCEDURE — 85025 COMPLETE CBC W/AUTO DIFF WBC: CPT | Performed by: HOSPITALIST

## 2018-03-20 PROCEDURE — 82962 GLUCOSE BLOOD TEST: CPT

## 2018-03-20 PROCEDURE — 78452 HT MUSCLE IMAGE SPECT MULT: CPT | Performed by: INTERNAL MEDICINE

## 2018-03-20 PROCEDURE — 63710000001 INSULIN ASPART PER 5 UNITS: Performed by: INTERNAL MEDICINE

## 2018-03-20 PROCEDURE — 25010000002 AZITHROMYCIN PER 500 MG: Performed by: HOSPITALIST

## 2018-03-20 PROCEDURE — 25010000002 FUROSEMIDE PER 20 MG: Performed by: INTERNAL MEDICINE

## 2018-03-20 PROCEDURE — 93005 ELECTROCARDIOGRAM TRACING: CPT | Performed by: NURSE PRACTITIONER

## 2018-03-20 PROCEDURE — A9500 TC99M SESTAMIBI: HCPCS | Performed by: INTERNAL MEDICINE

## 2018-03-20 PROCEDURE — 94799 UNLISTED PULMONARY SVC/PX: CPT

## 2018-03-20 PROCEDURE — 25010000002 REGADENOSON 0.4 MG/5ML SOLUTION: Performed by: INTERNAL MEDICINE

## 2018-03-20 PROCEDURE — 80053 COMPREHEN METABOLIC PANEL: CPT | Performed by: HOSPITALIST

## 2018-03-20 PROCEDURE — 84443 ASSAY THYROID STIM HORMONE: CPT | Performed by: HOSPITALIST

## 2018-03-20 PROCEDURE — 0 TECHNETIUM SESTAMIBI: Performed by: INTERNAL MEDICINE

## 2018-03-20 PROCEDURE — 93018 CV STRESS TEST I&R ONLY: CPT | Performed by: INTERNAL MEDICINE

## 2018-03-20 PROCEDURE — 83880 ASSAY OF NATRIURETIC PEPTIDE: CPT | Performed by: NURSE PRACTITIONER

## 2018-03-20 PROCEDURE — 25010000002 ENOXAPARIN PER 10 MG: Performed by: INTERNAL MEDICINE

## 2018-03-20 PROCEDURE — 78451 HT MUSCLE IMAGE SPECT SING: CPT

## 2018-03-20 PROCEDURE — 25010000002 CEFTRIAXONE PER 250 MG: Performed by: HOSPITALIST

## 2018-03-20 PROCEDURE — 83036 HEMOGLOBIN GLYCOSYLATED A1C: CPT | Performed by: HOSPITALIST

## 2018-03-20 PROCEDURE — 80061 LIPID PANEL: CPT | Performed by: NURSE PRACTITIONER

## 2018-03-20 PROCEDURE — 93017 CV STRESS TEST TRACING ONLY: CPT

## 2018-03-20 RX ADMIN — CEFTRIAXONE SODIUM 1 G: 1 INJECTION, SOLUTION INTRAVENOUS at 16:43

## 2018-03-20 RX ADMIN — METOPROLOL TARTRATE 50 MG: 50 TABLET, FILM COATED ORAL at 17:48

## 2018-03-20 RX ADMIN — PIOGLITAZONE 45 MG: 30 TABLET ORAL at 17:48

## 2018-03-20 RX ADMIN — ENOXAPARIN SODIUM 120 MG: 120 INJECTION SUBCUTANEOUS at 06:28

## 2018-03-20 RX ADMIN — INSULIN ASPART 2 UNITS: 100 INJECTION, SOLUTION INTRAVENOUS; SUBCUTANEOUS at 17:48

## 2018-03-20 RX ADMIN — AZITHROMYCIN 500 MG: 500 INJECTION, POWDER, LYOPHILIZED, FOR SOLUTION INTRAVENOUS at 17:49

## 2018-03-20 RX ADMIN — REGADENOSON 0.4 MG: 0.08 INJECTION, SOLUTION INTRAVENOUS at 10:40

## 2018-03-20 RX ADMIN — GUAIFENESIN 600 MG: 600 TABLET, EXTENDED RELEASE ORAL at 21:00

## 2018-03-20 RX ADMIN — VITAMIN D, TAB 1000IU (100/BT) 5000 UNITS: 25 TAB at 12:31

## 2018-03-20 RX ADMIN — GUAIFENESIN 600 MG: 600 TABLET, EXTENDED RELEASE ORAL at 12:31

## 2018-03-20 RX ADMIN — FUROSEMIDE 40 MG: 10 INJECTION, SOLUTION INTRAMUSCULAR; INTRAVENOUS at 12:31

## 2018-03-20 RX ADMIN — METOPROLOL TARTRATE 50 MG: 50 TABLET, FILM COATED ORAL at 06:29

## 2018-03-20 RX ADMIN — ENOXAPARIN SODIUM 120 MG: 120 INJECTION SUBCUTANEOUS at 17:48

## 2018-03-20 RX ADMIN — ASPIRIN 325 MG: 325 TABLET ORAL at 12:31

## 2018-03-20 RX ADMIN — INSULIN ASPART 4 UNITS: 100 INJECTION, SOLUTION INTRAVENOUS; SUBCUTANEOUS at 21:00

## 2018-03-20 RX ADMIN — PANTOPRAZOLE SODIUM 40 MG: 40 TABLET, DELAYED RELEASE ORAL at 06:29

## 2018-03-20 RX ADMIN — EZETIMIBE AND SIMVASTATIN: 10; 40 TABLET ORAL at 21:00

## 2018-03-20 RX ADMIN — TECHNETIUM TC 99M SESTAMIBI 1 DOSE: 1 INJECTION INTRAVENOUS at 10:40

## 2018-03-20 RX ADMIN — PIOGLITAZONE 45 MG: 30 TABLET ORAL at 12:31

## 2018-03-20 RX ADMIN — TECHNETIUM TC 99M SESTAMIBI 1 DOSE: 1 INJECTION INTRAVENOUS at 06:20

## 2018-03-20 NOTE — PLAN OF CARE
Problem: Patient Care Overview  Goal: Plan of Care Review  Outcome: Ongoing (interventions implemented as appropriate)   03/19/18 1827 03/20/18 0347   Coping/Psychosocial   Plan of Care Reviewed With --  patient   Plan of Care Review   Progress improving --    OTHER   Outcome Summary --  Increased back to 4L d/t decreased o2 Sats during sleep. Con't on IV diuretics and IV antibiotics with no s/s of ADR. NPO at this time for stress test today.      Goal: Individualization and Mutuality  Outcome: Ongoing (interventions implemented as appropriate)   03/20/18 0347   Individualization   Patient Specific Preferences Goes by Bill   Patient Specific Goals (Include Timeframe) Wants to get stress test over with so he can drink (d/t NPO status)        Problem: Cardiac Output Decreased (Adult)  Goal: Identify Related Risk Factors and Signs and Symptoms  Outcome: Ongoing (interventions implemented as appropriate)   03/20/18 0347   Cardiac Output Decreased (Adult)   Related Risk Factors (Cardiac Output Decreased) disease process;heart failure;oxygenation decreased;valve dysfunction   Signs and Symptoms (Cardiac Output Decreased) edema;heart rate/rhythm alteration     Goal: Effective Tissue Perfusion  Outcome: Ongoing (interventions implemented as appropriate)   03/20/18 0347   Cardiac Output Decreased (Adult)   Effective Tissue Perfusion making progress toward outcome       Problem: Fluid Volume Excess (Adult)  Goal: Identify Related Risk Factors and Signs and Symptoms  Outcome: Ongoing (interventions implemented as appropriate)   03/20/18 0347   Fluid Volume Excess (Adult)   Related Risk Factors (Fluid Volume Excess) autoregulation impaired   Signs and Symptoms (Fluid Volume Excess) acute weight gain;blood pressure/heart rate changes;edema;pulmonary congestion/pleural effusion;respiratory pattern changes     Goal: Optimal Fluid Balance  Outcome: Ongoing (interventions implemented as appropriate)   03/20/18 0347   Fluid Volume  Excess (Adult)   Optimal Fluid Balance making progress toward outcome       Problem: Cardiac Rhythm Management Device (Adult)  Goal: Signs and Symptoms of Listed Potential Problems Will be Absent, Minimized or Managed (Cardiac Rhythm Management Device)  Outcome: Ongoing (interventions implemented as appropriate)   03/20/18 0347   Goal/Outcome Evaluation   Problems Assessed (Cardiac Rhythm Management Device) all   Problems Present (Cardiac Rhythm Dev) dysrhythmia/arrhythmia;situational response       Problem: Fall Risk (Adult)  Goal: Identify Related Risk Factors and Signs and Symptoms  Outcome: Ongoing (interventions implemented as appropriate)   03/20/18 0347   Fall Risk (Adult)   Related Risk Factors (Fall Risk) age-related changes;slippery/uneven surfaces;environment unfamiliar   Signs and Symptoms (Fall Risk) presence of risk factors     Goal: Absence of Fall  Outcome: Ongoing (interventions implemented as appropriate)   03/20/18 0347   Fall Risk (Adult)   Absence of Fall making progress toward outcome

## 2018-03-20 NOTE — PROGRESS NOTES
"Daily progress note    Chief complaint  Doing same  No specific complaints  Going for stress Cardiolit    History of present illness  77-year-old white male with history of diabetes hypertension hyperlipidemia COPD BPH gastroesophageal reflux disease admitted through emergency room to cardiology service with shortness of breath for one week.  Patient also have nonproductive cough.  Patient denies any chest pain but has gained about 20-30 pounds over last 2-3 months.  Patient evaluated in ER found to be in rapid atrial fibrillation and congestive heart failure admitted for management.  I'm asked to follow the patient for medical problem.  At the time of interview he still short of breath but no chest pain palpitation.  Patient denies any fever but has nonproductive cough for long time.     REVIEW OF SYSTEMS  Review of Systems   Constitutional: Positive for unexpected weight change. Negative for activity change, appetite change and fever.   HENT: Negative for congestion and sore throat.    Eyes: Negative.    Respiratory: Positive for shortness of breath. Negative for cough.    Cardiovascular: Positive for leg swelling. Negative for chest pain.   Gastrointestinal: Negative for abdominal pain, diarrhea and vomiting.   Endocrine: Negative.    Genitourinary: Negative for decreased urine volume and dysuria.   Musculoskeletal: Negative for neck pain.   Skin: Negative for rash and wound.   Allergic/Immunologic: Negative.    Neurological: Negative for weakness, numbness and headaches.   Hematological: Negative.    Psychiatric/Behavioral: Negative.    All other systems reviewed and are negative.     PHYSICAL EXAM  Blood pressure 122/65, pulse 103, temperature 97.4 °F (36.3 °C), temperature source Oral, resp. rate 14, height 182.9 cm (72\"), weight 116 kg (255 lb), SpO2 95 %.    Constitutional: He is oriented to person, place, and time. He appears distressed (respiratory).   Head: Normocephalic and atraumatic.   Eyes: EOM are " normal. Pupils are equal, round, and reactive to light.   Neck: Normal range of motion. Neck supple.   Cardiovascular: Regular rhythm and normal heart sounds.  Tachycardia present.    Pulmonary/Chest: He is in respiratory distress (mild). He has rhonchi in the right lower field.   Abdominal: Soft. There is no tenderness. There is no rebound and no guarding.   Musculoskeletal: Normal range of motion. He exhibits edema (2+ pitting, BLE).   Neurological: He is alert and oriented to person, place, and time. He has normal sensation and normal strength.   Skin: Skin is warm and dry.   Psychiatric: Mood and affect normal.     LAB RESULTS  Lab Results (last 24 hours)     Procedure Component Value Units Date/Time    POC Glucose Once [[id number]]  (Normal) Collected:  03/20/18 1223    Specimen:  Blood Updated:  03/20/18 1225     Glucose 98 mg/dL     Narrative:       Meter: [id number] : [id number] Guy ARMSTRONG    Respiratory Culture - Sputum, Cough [[id number]] Collected:  03/19/18 1547    Specimen:  Sputum from Cough Updated:  03/20/18 0952     Respiratory Culture --      Heavy growth (4+) Normal Respiratory [place]ia     Gram Stain Result Rare (1+) WBCs seen      Rare (1+) Epithelial cells per low power field      Mixed bacterial morphotypes seen on Gram Stain    POC Glucose Once [[id number]]  (Normal) Collected:  03/20/18 0552    Specimen:  Blood Updated:  03/20/18 0553     Glucose 98 mg/dL     Narrative:       Meter: [id number] : [id number] Alex Gama CNA    BNP [[id number]]  (Normal) Collected:  03/20/18 0316    Specimen:  Blood Updated:  03/20/18 0430     proBNP 1,232.0 pg/mL     Narrative:       Among patients with dyspnea, NT-proBNP is highly sensitive for the detection of acute congestive heart failure. In addition NT-proBNP of <300 pg/ml effectively rules out acute congestive heart failure with 99% negative predictive value.    TSH [[id number]]  (Normal) Collected:  03/20/18 0316    Specimen:  Blood Updated:   03/20/18 0430     TSH 1.250 mIU/mL     Comprehensive Metabolic Panel [[id number]]  (Abnormal) Collected:  03/20/18 0316    Specimen:  Blood Updated:  03/20/18 0420     Glucose 100 (H) mg/dL      BUN 26 (H) mg/dL      Creatinine 1.13 mg/dL      Sodium 140 mmol/L      Potassium 4.0 mmol/L      Chloride 95 (L) mmol/L      CO2 33.6 (H) mmol/L      Calcium 9.3 mg/dL      Total Protein 6.8 g/dL      Albumin 4.10 g/dL      ALT (SGPT) 17 U/L      AST (SGOT) 18 U/L      Alkaline Phosphatase 51 U/L      Total Bilirubin 0.6 mg/dL      eGFR Non African Amer 63 mL/min/1.73      Globulin 2.7 gm/dL      A/G Ratio 1.5 g/dL      BUN/Creatinine Ratio 23.0     Anion Gap 11.4 mmol/L     Narrative:       The MDRD GFR formula is only valid for adults with stable renal function between ages 18 and 70.    Lipid Panel [[id number]]  (Abnormal) Collected:  03/20/18 0316    Specimen:  Blood Updated:  03/20/18 0420     Total Cholesterol 116 mg/dL      Triglycerides 103 mg/dL      HDL Cholesterol 33 (L) mg/dL      LDL Cholesterol  62 mg/dL      VLDL Cholesterol 20.6 mg/dL      LDL/HDL Ratio 1.89    Narrative:       Cholesterol Reference Ranges  (U.S. Department of Health and Human Services ATP III Classifications)    Desirable          <200 mg/dL  Borderline High    200-239 mg/dL  High Risk          >240 mg/dL      Triglyceride Reference Ranges  (U.S. Department of Health and Human Services ATP III Classifications)    Normal           <150 mg/dL  Borderline High  150-199 mg/dL  High             200-499 mg/dL  Very High        >500 mg/dL    HDL Reference Ranges  (U.S. Department of Health and Human Services ATP III Classifcations)    Low     <40 mg/dl (major risk factor for CHD)  High    >60 mg/dl ('negative' risk factor for CHD)        LDL Reference Ranges  (U.S. Department of Health and Human Services ATP III Classifcations)    Optimal          <100 mg/dL  Near Optimal     100-129 mg/dL  Borderline High  130-159 mg/dL  High             160-189  mg/dL  Very High        >189 mg/dL    CBC & Differential [[id number]] Collected:  03/20/18 0316    Specimen:  Blood Updated:  03/20/18 0415    Narrative:       The following orders were created for panel order CBC & Differential.  Procedure                               Abnormality         Status                     ---------                               -----------         ------                     CBC Auto Differential[[id number]]        Abnormal            Final result                 Please view results for these tests on the individual orders.    CBC Auto Differential [[id number]]  (Abnormal) Collected:  03/20/18 0316    Specimen:  Blood Updated:  03/20/18 0415     WBC 6.87 10*3/mm3      RBC 4.60 10*6/mm3      Hemoglobin 12.6 (L) g/dL      Hematocrit 42.2 %      MCV 91.7 fL      MCH 27.4 pg      MCHC 29.9 (L) g/dL      RDW 16.3 (H) %      RDW-SD 54.2 (H) fl      MPV 11.7 fL      Platelets 174 10*3/mm3      Neutrophil % 63.0 %      Lymphocyte % 21.8 %      Monocyte % 11.4 %      Eosinophil % 2.8 %      Basophil % 0.6 %      Immature Grans % 0.4 %      Neutrophils, Absolute 4.33 10*3/mm3      Lymphocytes, Absolute 1.50 10*3/mm3      Monocytes, Absolute 0.78 10*3/mm3      Eosinophils, Absolute 0.19 10*3/mm3      Basophils, Absolute 0.04 10*3/mm3      Immature Grans, Absolute 0.03 10*3/mm3     Hemoglobin A1c [[id number]]  (Abnormal) Collected:  03/20/18 0316    Specimen:  Blood Updated:  03/20/18 0405     Hemoglobin A1C 7.32 (H) %     Narrative:       Hemoglobin A1C Ranges:    Increased Risk for Diabetes  5.7% to 6.4%  Diabetes                     >= 6.5%  Diabetic Goal                < 7.0%    Troponin [[id number]]  (Normal) Collected:  03/20/18 0003    Specimen:  Blood Updated:  03/20/18 0049     Troponin T <0.010 ng/mL     Narrative:       Troponin T Reference Ranges:  Less than 0.03 ng/mL:    Negative for AMI  0.03 to 0.09 ng/mL:      Indeterminant for AMI  Greater than 0.09 ng/mL: Positive for AMI    POC Glucose  Once [[id number]]  (Abnormal) Collected:  03/19/18 2007    Specimen:  Blood Updated:  03/19/18 2008     Glucose 142 (H) mg/dL     Narrative:       Meter: [id number] : [id number] Alex Gama CNA    Troponin [[id number]]  (Normal) Collected:  03/19/18 1830    Specimen:  Blood Updated:  03/19/18 2006     Troponin T <0.010 ng/mL     Narrative:       Troponin T Reference Ranges:  Less than 0.03 ng/mL:    Negative for AMI  0.03 to 0.09 ng/mL:      Indeterminant for AMI  Greater than 0.09 ng/mL: Positive for AMI    Respiratory Panel, PCR - Swab, Nasopharynx [[id number]]  (Normal) Collected:  03/19/18 1553    Specimen:  Swab from Nasopharynx Updated:  03/19/18 1856     ADENOVIRUS, PCR Not Detected     Coronavirus 229E Not Detected     Coronavirus HKU1 Not Detected     Coronavirus NL63 Not Detected     Coronavirus OC43 Not Detected     Human Metapneumovirus Not Detected     Human Rhinovirus/Enterovirus Not Detected     Influenza B PCR Not Detected     Parainfluenza Virus 1 Not Detected     Parainfluenza Virus 2 Not Detected     Parainfluenza Virus 3 Not Detected     Parainfluenza Virus 4 Not Detected     Bordetella pertussis pcr Not Detected     Influenza A H1 2009 PCR Not Detected     Chlamydophila pneumoniae PCR Not Detected     Mycoplasma pneumo by PCR Not Detected     Influenza A PCR Not Detected     Influenza A H3 Not Detected     Influenza A H1 Not Detected     RSV, PCR Not Detected        Imaging Results (last 24 hours)     Procedure Component Value Units Date/Time    CT Angiogram Chest With Contrast [[id number]] Collected:  03/19/18 0215     Updated:  03/19/18 2354    Narrative:       CT ANGIOGRAM CHEST WITH CONTRAST.     TECHNIQUE: Radiation dose reduction techniques were utilized, including  automated exposure control and exposure modulation based on body size. A  routine enhanced CTA of the chest was performed, tailored in order to  evaluate the pulmonary arteries. Sagittal and coronal  two-dimensional  reformations are provided for review. Three-dimensional reformations are  also provided.     HISTORY: Dyspnea, cardiac origin.     COMPARISON: No prior studies for comparison.     FINDINGS:   No occlusive filling defects are seen in the main pulmonary artery or  its first-degree branches to suggest acute pulmonary embolism.         No mediastinal lymphadenopathy or pericardial effusion. A few  subcentimeter lymph nodes are present.     Small bilateral pleural effusions. Pulmonary congestion. Mild emphysema.     Spiculated nodule in the left upper lobe measures approximately 1 cm,  image 47. A few groundglass nodules are also seen on the left for  example on image 54 measuring 1 cm.      Upper abdominal viscera are essentially unremarkable.       Impression:       1.  No evidence for acute pulmonary embolism.   2.  Findings of congestive heart failure with bilateral small effusions.  3.  1 cm suspected nodule in the left upper lobe, neoplasm is suspected.  If available comparison with old studies, otherwise follow-up in 3  months may be performed. Histological sampling may be obtained.  4.  A few groundglass nodules measuring up to 1 cm. If available  comparison with old studies, otherwise follow-up in 3 months may be  performed.     This report was finalized on 3/19/2018 11:51 PM by Dr. Eloina Vallecillo MD.       XR Chest 1 View [[id number]] Collected:  03/19/18 0029     Updated:  03/19/18 2345    Narrative:       X-RAY CHEST 1 VIEW.     HISTORY: Shortness of breath.     COMPARISON: No prior studies for comparison.     FINDINGS:  Cardiomediastinal silhouette is within normal limits. Low lung volumes.     Right perihilar opacity. No definite effusion.          Impression:       Right perihilar opacity may represent an infiltrate please clinically  correlate.         This report was finalized on 3/19/2018 11:42 PM by Dr. Eloina Vallecillo MD.                EKG                              Rhythm/Rate:  A-flutter 131, PAC's present   P waves and TX: absent   QRS, axis: nml   ST and T waves: nml       Current Facility-Administered Medications:   •  acetaminophen (TYLENOL) tablet 650 mg, 650 mg, Oral, Q4H PRN, EMILY Adams  •  aspirin tablet 325 mg, 325 mg, Oral, Daily, EMILY Adams, 325 mg at 03/20/18 1231  •  azithromycin (ZITHROMAX) 500 mg in sodium chloride 0.9 % 250 mL IVPB, 500 mg, Intravenous, Q24H, Godfrey North MD, 500 mg at 03/19/18 1806  •  cefTRIAXone (ROCEPHIN) IVPB 1 g, 1 g, Intravenous, Q24H, Godfrey North MD, Last Rate: 100 mL/hr at 03/19/18 1727, 1 g at 03/19/18 1727  •  cholecalciferol (VITAMIN D3) tablet 5,000 Units, 5,000 Units, Oral, Daily, EMILY Adams, 5,000 Units at 03/20/18 1231  •  dextrose (D50W) solution 25 g, 25 g, Intravenous, Q15 Min PRN, Can Carr MD  •  dextrose (GLUTOSE) oral gel 15 g, 15 g, Oral, Q15 Min PRN, Can Carr MD  •  enoxaparin (LOVENOX) syringe 120 mg, 1 mg/kg, Subcutaneous, Q12H, Can Carr MD, 120 mg at 03/20/18 0628  •  ezetimibe-simvastatin (VYTORIN) 10-40 MG per tablet, , Oral, Nightly, Can Carr MD, 1 tablet at 03/19/18 2044  •  furosemide (LASIX) injection 40 mg, 40 mg, Intravenous, Q12H, Can Carr MD, 40 mg at 03/20/18 1231  •  glucagon (human recombinant) (GLUCAGEN DIAGNOSTIC) injection 1 mg, 1 mg, Subcutaneous, PRN, Can Carr MD  •  guaiFENesin (MUCINEX) 12 hr tablet 600 mg, 600 mg, Oral, BID, Godfrey North MD, 600 mg at 03/20/18 1231  •  insulin aspart (novoLOG) injection 0-7 Units, 0-7 Units, Subcutaneous, 4x Daily With Meals & Nightly, Can Carr MD  •  ipratropium-albuterol (DUO-NEB) nebulizer solution 3 mL, 3 mL, Nebulization, Q4H PRN, Godfrey North MD  •  Magnesium Sulfate 2 gram infusion - Mg less than or equal to 1.5 mg/dL, 2 g, Intravenous, PRN **OR** Magesium Sulfate 1 gram infusion - Mg 1.6-1.9 mg/dL, 1 g, Intravenous, PRN, EMILY Adams  •  metoprolol  tartrate (LOPRESSOR) tablet 50 mg, 50 mg, Oral, Q12H, Niecy Arguelles, APRN, 50 mg at 03/20/18 0629  •  nitroglycerin (NITROSTAT) SL tablet 0.4 mg, 0.4 mg, Sublingual, Q5 Min PRN, Niecy CLARKE Strain, APRN  •  pantoprazole (PROTONIX) EC tablet 40 mg, 40 mg, Oral, Q AM, Niecy Arguelles, APRN, 40 mg at 03/20/18 0629  •  pioglitazone (ACTOS) tablet 45 mg, 45 mg, Oral, BID With Meals, 45 mg at 03/20/18 1231 **AND** [DISCONTINUED] metFORMIN (GLUCOPHAGE) tablet 850 mg, 850 mg, Oral, BID With Meals, Niecy Arguelles, APRN, 850 mg at 03/19/18 1044  •  potassium chloride (MICRO-K) CR capsule 40 mEq, 40 mEq, Oral, PRN **OR** potassium chloride (KLOR-CON) packet 40 mEq, 40 mEq, Oral, PRN **OR** potassium chloride 10 mEq in 100 mL IVPB, 10 mEq, Intravenous, Q1H PRN, Niecy CLARKE Strain, APRN  •  sodium chloride 0.9 % flush 1-10 mL, 1-10 mL, Intravenous, PRN, Niecy Arguelles, APRN  •  Insert peripheral IV, , , Once **AND** sodium chloride 0.9 % flush 10 mL, 10 mL, Intravenous, PRN, Mayo Glez MD     ASSESSMENT  Acute bronchitis  COPD exacerbation  New onset A. fib with rapid ventricular rate  Acute hypoxic respiratory failure  New-onset congestive heart failure  Diabetes mellitus  Hypertension  Hyperlipidemia  Obesity  Gastroesophageal reflux disease  BPH    PLAN  CPM  Supplement oxygen nebulizer and antibiotics   Stop Glucophage and increase Actos  Accu-Chek sliding scale insulin  Diuresis per cardiology  Stress ulcer DVT prophylaxis  Check 2-D echo and stress Cardiolite   Supportive care  Discussed with wife and granddaughter   Will follow with Dr. GOTTI further recommendation according to hospital course    ANDREW PACHECO MD

## 2018-03-20 NOTE — PROGRESS NOTES
"                                              LOS: 1 day   Patient Care Team:  Peyman Encarnacion MD as PCP - General    Chief Complaint:  Follow-up on possible COPD, respiratory failure and pulmonary nodule    Interval History:    He feels better today.  He denies cough or sputum production.  Breathing has improved.  He was started on diuretic yesterday.  He remains on oxygen 2 L/m.  Wife is at bedside and I discussed the care with her.       Ventilator/Non-Invasive Ventilation Settings     None            Vital Signs  Temp:  [97.1 °F (36.2 °C)-97.4 °F (36.3 °C)] 97.4 °F (36.3 °C)  Heart Rate:  [] 103  Resp:  [14-20] 14  BP: (111-143)/(61-85) 122/65    Intake/Output Summary (Last 24 hours) at 03/20/18 1503  Last data filed at 03/20/18 1414   Gross per 24 hour   Intake             1760 ml   Output             2230 ml   Net             -470 ml     Flowsheet Rows    Flowsheet Row First Filed Value   Admission Height 182.9 cm (72\") Documented at 03/19/2018 0030   Admission Weight 118 kg (260 lb) Documented at 03/19/2018 0030          Physical Exam:   General Appearance:    Alert, cooperative, in no acute distress   Lungs:    Bilateral basal crackles.  No wheezing on today's exam     Heart:    Regular rhythm and normal rate, normal S1 and S2, no            murmur, no gallop, no rub, no click   Chest Wall:    No abnormalities observed   Abdomen:     Obese.  Soft.  No tenderness    Neuro:   Conscious, alert, oriented x3   Extremities:   Moves all extremities well, no edema, no cyanosis, no             Redness          Results Review:          Results from last 7 days  Lab Units 03/20/18  0316 03/19/18  0921 03/19/18  0033   SODIUM mmol/L 140 141 138   POTASSIUM mmol/L 4.0 4.1 4.6   CHLORIDE mmol/L 95* 97* 99   CO2 mmol/L 33.6* 32.2* 31.5*   BUN mg/dL 26* 22 22   CREATININE mg/dL 1.13 1.24 1.22   GLUCOSE mg/dL 100* 166* 180*   CALCIUM mg/dL 9.3 9.4 9.4       Results from last 7 days  Lab Units 03/20/18  0003 " 03/19/18  1830 03/19/18  1211   TROPONIN T ng/mL <0.010 <0.010 <0.010       Results from last 7 days  Lab Units 03/20/18  0316 03/19/18  0033   WBC 10*3/mm3 6.87 5.25   HEMOGLOBIN g/dL 12.6* 12.3*   HEMATOCRIT % 42.2 41.1   PLATELETS 10*3/mm3 174 171       Results from last 7 days  Lab Units 03/19/18  0033   INR  1.15*   APTT seconds 27.7       Results from last 7 days  Lab Units 03/20/18  0316   PROBNP pg/mL 1,232.0       I reviewed the patient's new clinical results.  I personally viewed and interpreted the patient's CXR        Medication Review:     aspirin 325 mg Oral Daily   azithromycin 500 mg Intravenous Q24H   ceftriaxone 1 g Intravenous Q24H   cholecalciferol 5,000 Units Oral Daily   enoxaparin 1 mg/kg Subcutaneous Q12H   ezetimibe-simvastatin  Oral Nightly   furosemide 40 mg Intravenous Q12H   guaiFENesin 600 mg Oral BID   insulin aspart 0-7 Units Subcutaneous 4x Daily With Meals & Nightly   metoprolol tartrate 50 mg Oral Q12H   pantoprazole 40 mg Oral Q AM   pioglitazone 45 mg Oral BID With Meals       Diagnostic imaging:  I personally and independently reviewed the following images:   Bilateral pleural effusion. DREW pulmonary nodules, 1 cm. Bilateral GG nodules/opacities               Assessment      Assessment and Plan:  1. Acute Systolic CHF EF 40%   2. Atrial fibrillation/flutter  3. Acute hypoxic respiratory failure, 2ary to #1 and #4  4. Likely COPD, unclear if he has acute exacerbation  5. DREW pulmonary nodule, 1 cm  6. Bilateral pleural effusion  7. Mild pulmonary hypertension per echo (RVSP=35-45 mmHg), [facility] cardiac +/- COPD realted  8. Prior history of benign lung tumor, s/p resection many years ago     · Agree with current bronchodilators. Would need an outpatient PFT.  · No evidence of pneumonia but may have bronchitis. Consider stopping Rocephin. Continue Azithromycin for bronchitis. Symptoms appear to be predominantly related to cardiac decompensation since he does not have productive  cough. Consider gentle diuresis.   · Would definitely benefit from outpatient PSG  · Needs repeat CT chest in 3 months. He's at royce risk of lung cancer. I expressed my concerns about the DREW nodule. This could potentially be benign, (scar secondary to previous resection ?)      Cindy Robertson MD  03/20/18  3:03 PM          This note was dictated utilizing [employer] dictation

## 2018-03-21 ENCOUNTER — APPOINTMENT (OUTPATIENT)
Dept: CARDIOLOGY | Facility: HOSPITAL | Age: 78
End: 2018-03-21
Attending: INTERNAL MEDICINE

## 2018-03-21 LAB
ANION GAP SERPL CALCULATED.3IONS-SCNC: 12.4 MMOL/L
BACTERIA SPEC RESP CULT: NORMAL
BACTERIA SPEC RESP CULT: NORMAL
BH CV ECHO MEAS - BSA(HAYCOCK): 2.4 M^2
BH CV ECHO MEAS - BSA: 2.3 M^2
BH CV ECHO MEAS - BZI_BMI: 33.9 KILOGRAMS/M^2
BH CV ECHO MEAS - BZI_METRIC_HEIGHT: 182.9 CM
BH CV ECHO MEAS - BZI_METRIC_WEIGHT: 113.4 KG
BH CV STRESS COMMENTS STAGE 1: NORMAL
BH CV STRESS DOSE REGADENOSON STAGE 1: 0.4
BH CV STRESS DURATION MIN STAGE 1: 0
BH CV STRESS DURATION SEC STAGE 1: 10
BH CV STRESS PROTOCOL 1: NORMAL
BH CV STRESS RECOVERY BP: NORMAL MMHG
BH CV STRESS RECOVERY HR: 106 BPM
BH CV STRESS STAGE 1: 1
BUN BLD-MCNC: 32 MG/DL (ref 8–23)
BUN/CREAT SERPL: 25.6 (ref 7–25)
CALCIUM SPEC-SCNC: 8.8 MG/DL (ref 8.6–10.5)
CHLORIDE SERPL-SCNC: 95 MMOL/L (ref 98–107)
CO2 SERPL-SCNC: 33.6 MMOL/L (ref 22–29)
CREAT BLD-MCNC: 1.25 MG/DL (ref 0.76–1.27)
GFR SERPL CREATININE-BSD FRML MDRD: 56 ML/MIN/1.73
GLUCOSE BLD-MCNC: 107 MG/DL (ref 65–99)
GLUCOSE BLDC GLUCOMTR-MCNC: 114 MG/DL (ref 70–130)
GLUCOSE BLDC GLUCOMTR-MCNC: 131 MG/DL (ref 70–130)
GLUCOSE BLDC GLUCOMTR-MCNC: 197 MG/DL (ref 70–130)
GLUCOSE BLDC GLUCOMTR-MCNC: 219 MG/DL (ref 70–130)
GRAM STN SPEC: NORMAL
LV EF NUC BP: 40 %
MAXIMAL PREDICTED HEART RATE: 141 BPM
NT-PROBNP SERPL-MCNC: 938.2 PG/ML (ref 0–1800)
PERCENT MAX PREDICTED HR: 78.01 %
POTASSIUM BLD-SCNC: 4 MMOL/L (ref 3.5–5.2)
SODIUM BLD-SCNC: 141 MMOL/L (ref 136–145)
STRESS BASELINE BP: NORMAL MMHG
STRESS BASELINE HR: 103 BPM
STRESS PERCENT HR: 92 %
STRESS POST PEAK BP: NORMAL MMHG
STRESS POST PEAK HR: 110 BPM
STRESS TARGET HR: 120 BPM

## 2018-03-21 PROCEDURE — 93010 ELECTROCARDIOGRAM REPORT: CPT | Performed by: INTERNAL MEDICINE

## 2018-03-21 PROCEDURE — 63710000001 INSULIN ASPART PER 5 UNITS: Performed by: INTERNAL MEDICINE

## 2018-03-21 PROCEDURE — 25010000002 ENOXAPARIN PER 10 MG: Performed by: INTERNAL MEDICINE

## 2018-03-21 PROCEDURE — 80048 BASIC METABOLIC PNL TOTAL CA: CPT | Performed by: HOSPITALIST

## 2018-03-21 PROCEDURE — 25010000002 FUROSEMIDE PER 20 MG: Performed by: INTERNAL MEDICINE

## 2018-03-21 PROCEDURE — 25010000002 CEFTRIAXONE PER 250 MG: Performed by: HOSPITALIST

## 2018-03-21 PROCEDURE — 99152 MOD SED SAME PHYS/QHP 5/>YRS: CPT

## 2018-03-21 PROCEDURE — 25010000002 MIDAZOLAM PER 1 MG: Performed by: INTERNAL MEDICINE

## 2018-03-21 PROCEDURE — 82962 GLUCOSE BLOOD TEST: CPT

## 2018-03-21 PROCEDURE — 25010000002 AZITHROMYCIN PER 500 MG: Performed by: HOSPITALIST

## 2018-03-21 PROCEDURE — 83880 ASSAY OF NATRIURETIC PEPTIDE: CPT | Performed by: INTERNAL MEDICINE

## 2018-03-21 PROCEDURE — 99232 SBSQ HOSP IP/OBS MODERATE 35: CPT | Performed by: INTERNAL MEDICINE

## 2018-03-21 PROCEDURE — 93005 ELECTROCARDIOGRAM TRACING: CPT | Performed by: NURSE PRACTITIONER

## 2018-03-21 PROCEDURE — 93312 ECHO TRANSESOPHAGEAL: CPT

## 2018-03-21 PROCEDURE — 25010000002 FENTANYL CITRATE (PF) 100 MCG/2ML SOLUTION: Performed by: INTERNAL MEDICINE

## 2018-03-21 RX ORDER — MIDAZOLAM HYDROCHLORIDE 1 MG/ML
INJECTION INTRAMUSCULAR; INTRAVENOUS
Status: COMPLETED | OUTPATIENT
Start: 2018-03-21 | End: 2018-03-21

## 2018-03-21 RX ORDER — POTASSIUM CHLORIDE 750 MG/1
20 CAPSULE, EXTENDED RELEASE ORAL DAILY
Status: DISCONTINUED | OUTPATIENT
Start: 2018-03-21 | End: 2018-03-22 | Stop reason: HOSPADM

## 2018-03-21 RX ORDER — SODIUM CHLORIDE 9 MG/ML
INJECTION, SOLUTION INTRAVENOUS
Status: COMPLETED | OUTPATIENT
Start: 2018-03-21 | End: 2018-03-21

## 2018-03-21 RX ORDER — FUROSEMIDE 40 MG/1
40 TABLET ORAL
Status: DISCONTINUED | OUTPATIENT
Start: 2018-03-21 | End: 2018-03-22 | Stop reason: HOSPADM

## 2018-03-21 RX ORDER — ASPIRIN 81 MG/1
81 TABLET ORAL DAILY
Status: DISCONTINUED | OUTPATIENT
Start: 2018-03-21 | End: 2018-03-22 | Stop reason: HOSPADM

## 2018-03-21 RX ORDER — FENTANYL CITRATE 50 UG/ML
INJECTION, SOLUTION INTRAMUSCULAR; INTRAVENOUS
Status: COMPLETED | OUTPATIENT
Start: 2018-03-21 | End: 2018-03-21

## 2018-03-21 RX ADMIN — GUAIFENESIN 600 MG: 600 TABLET, EXTENDED RELEASE ORAL at 21:13

## 2018-03-21 RX ADMIN — INSULIN ASPART 2 UNITS: 100 INJECTION, SOLUTION INTRAVENOUS; SUBCUTANEOUS at 21:14

## 2018-03-21 RX ADMIN — ASPIRIN 81 MG: 81 TABLET ORAL at 15:52

## 2018-03-21 RX ADMIN — AZITHROMYCIN 500 MG: 500 INJECTION, POWDER, LYOPHILIZED, FOR SOLUTION INTRAVENOUS at 17:22

## 2018-03-21 RX ADMIN — METOPROLOL TARTRATE 50 MG: 50 TABLET, FILM COATED ORAL at 05:41

## 2018-03-21 RX ADMIN — FUROSEMIDE 40 MG: 40 TABLET ORAL at 16:56

## 2018-03-21 RX ADMIN — FUROSEMIDE 40 MG: 10 INJECTION, SOLUTION INTRAMUSCULAR; INTRAVENOUS at 05:41

## 2018-03-21 RX ADMIN — SODIUM CHLORIDE 50 ML/HR: 9 INJECTION, SOLUTION INTRAVENOUS at 12:40

## 2018-03-21 RX ADMIN — CEFTRIAXONE SODIUM 1 G: 1 INJECTION, SOLUTION INTRAVENOUS at 16:47

## 2018-03-21 RX ADMIN — VITAMIN D, TAB 1000IU (100/BT) 5000 UNITS: 25 TAB at 15:52

## 2018-03-21 RX ADMIN — MIDAZOLAM 1 MG: 1 INJECTION INTRAMUSCULAR; INTRAVENOUS at 12:54

## 2018-03-21 RX ADMIN — METOPROLOL TARTRATE 50 MG: 50 TABLET, FILM COATED ORAL at 16:56

## 2018-03-21 RX ADMIN — EZETIMIBE AND SIMVASTATIN: 10; 40 TABLET ORAL at 21:20

## 2018-03-21 RX ADMIN — INSULIN ASPART 3 UNITS: 100 INJECTION, SOLUTION INTRAVENOUS; SUBCUTANEOUS at 16:57

## 2018-03-21 RX ADMIN — FUROSEMIDE 40 MG: 10 INJECTION, SOLUTION INTRAMUSCULAR; INTRAVENOUS at 00:09

## 2018-03-21 RX ADMIN — APIXABAN 5 MG: 5 TABLET, FILM COATED ORAL at 15:52

## 2018-03-21 RX ADMIN — PANTOPRAZOLE SODIUM 40 MG: 40 TABLET, DELAYED RELEASE ORAL at 05:41

## 2018-03-21 RX ADMIN — ENOXAPARIN SODIUM 120 MG: 120 INJECTION SUBCUTANEOUS at 05:41

## 2018-03-21 RX ADMIN — POTASSIUM CHLORIDE 20 MEQ: 750 CAPSULE, EXTENDED RELEASE ORAL at 15:52

## 2018-03-21 RX ADMIN — FENTANYL CITRATE 25 MCG: 50 INJECTION INTRAMUSCULAR; INTRAVENOUS at 12:54

## 2018-03-21 NOTE — PROGRESS NOTES
"[facility]  Cardiology Progress Note    Patient Identification:  Name: Noel Garcia  Age: 77 y.o.  Sex: male  :  1940  MRN: [id number]                 Follow Up / Chief Complaint: Atrial fibrillation and shortness of breath    Interval History:  77-year-old male admitted with worsening shortness of breath atrial fibrillation with echo showing cardiomyopathy blood glucose with a transesophageal echocardiogram which was unsuccessful     Subjective:  Shortness of breath is better    Objective:    Past Medical History:  Past Medical History:   Diagnosis Date   • Benign essential hypertension    • Benign prostatic hyperplasia 2016   • Diabetes mellitus    • Gastroesophageal reflux disease 2016   • GERD (gastroesophageal reflux disease)    • Hamartoma of lung    • Hyperlipidemia    • Microalbuminuria    • Obesity    • Prostatic hyperplasia    • Type 2 diabetes mellitus    • Vitamin D deficiency      Past Surgical History:  Past Surgical History:   Procedure Laterality Date   • ADENOIDECTOMY     • BACK SURGERY      EXCISION MELANOMA   • COLONOSCOPY Bilateral 2016    Dr. Brunner-\"normal\" according to the patient   • HERNIA REPAIR     • LUNG SURGERY Left    • TONSILLECTOMY     • UPPER GASTROINTESTINAL ENDOSCOPY Bilateral 2016    Dr. Foss-esophageal dilation according to the patient.        Social History:   Social History   Substance Use Topics   • Smoking status: Former Smoker   • Smokeless tobacco: Not on file   • Alcohol use Yes      Comment: SOCIAL      Family History:  Family History   Problem Relation Age of Onset   • Hypertension Father    • Coronary artery disease Father    • Diabetes Father    • Hyperlipidemia Father    • Coronary artery disease Brother           Allergies:  Allergies   Allergen Reactions   • Gemfibrozil Unknown (See Comments)     Patient doesn't remember    • Atorvastatin Rash     Scheduled Meds:    aspirin 325 mg Daily   azithromycin 500 mg Q24H "   ceftriaxone 1 g Q24H   cholecalciferol 5,000 Units Daily   enoxaparin 1 mg/kg Q12H   ezetimibe-simvastatin  Nightly   furosemide 40 mg Q12H   guaiFENesin 600 mg BID   insulin aspart 0-7 Units 4x Daily With Meals & Nightly   metoprolol tartrate 50 mg Q12H   pantoprazole 40 mg Q AM   pioglitazone 45 mg BID With Meals           INTAKE AND OUTPUT:    Intake/Output Summary (Last 24 hours) at 03/21/18 1402  Last data filed at 03/21/18 0500   Gross per 24 hour   Intake             1400 ml   Output             2725 ml   Net            -1325 ml       Review of Systems:   GI:  Cardiac:No chest pain  Pulmonary:Shortness of breath    Constitutional:  Temp:  [97.4 °F (36.3 °C)-97.9 °F (36.6 °C)] 97.7 °F (36.5 °C)  Heart Rate:  [101-111] 109  Resp:  [18-22] 22  BP: ()/(47-82) 117/68    Physical Exam:  General:  Appears in no acute distress  Eyes: PERTL,  HEENT:  No JVD. Thyroid not visibly enlarged. No mucosal pallor or cyanosis  Respiratory: Respirations regular and unlabored at rest. BBS with good air entry in all fields. No crackles, rubs or wheezes auscultated  Cardiovascular: S1S2 irregular rate and rhythm. No murmur, rub or gallop. No carotid bruits. DP/PT pulses     . No pretibial pitting edema  Gastrointestinal: Abdomen soft, flat, non tender. Bowel sounds present. No hepatosplenomegaly. No ascites  Musculoskeletal: LEWIS x4. No abnormal movements  Extremities: No digital clubbing or cyanosis  Skin: Color pink. Skin warm and dry to touch. No rashes    Neuro: AAO x3 CN II-XII grossly intact  Psych: Mood and affect normal, pleasant and cooperative          Cardiographics  Telemetry:     ECG:     Echocardiogram:     Lab Review     Results from last 7 days  Lab Units 03/20/18  0003 03/19/18  1830 03/19/18  1211   TROPONIN T ng/mL <0.010 <0.010 <0.010       Results from last 7 days  Lab Units 03/19/18  0921   MAGNESIUM mg/dL 2.3       Results from last 7 days  Lab Units 03/21/18  0333   SODIUM mmol/L 141   POTASSIUM  "mmol/L 4.0   BUN mg/dL 32*   CREATININE mg/dL 1.25   CALCIUM mg/dL 8.8     @LABRCNTIPbnp@    Results from last 7 days  Lab Units 03/20/18  0316 03/19/18  0033   WBC 10*3/mm3 6.87 5.25   HEMOGLOBIN g/dL 12.6* 12.3*   HEMATOCRIT % 42.2 41.1   PLATELETS 10*3/mm3 174 171       Results from last 7 days  Lab Units 03/19/18  0033   INR  1.15*   APTT seconds 27.7         Assessment:  Patient Active Problem List   Diagnosis   • Vitamin D deficiency   • Benign essential hypertension   • Diabetes mellitus   • Gastroesophageal reflux disease   • Hyperlipidemia   • Hypogonadism in male   • Microalbuminuria   • Adiposity   • Benign prostatic hyperplasia   • Uncontrolled type 2 diabetes mellitus   • Cerebrovascular accident   • Elevated lipids   • Hamartoma   • Hypertension   • Malignant melanoma   • New onset atrial fibrillation           Plan:  We'll switch Lovenox to Eliquis 5 mg by mouth twice a day    Change the Lasix to by mouth    With BMP as well as BMP in the morning    Possible discharge soon        )3/21/2018  MD MICHELE Romero/Transcription:   \"Dictated utilizing Dragon dictation\".     "

## 2018-03-21 NOTE — PROGRESS NOTES
"                                              LOS: 2 days   Patient Care Team:  Peyman Encarnacion MD as PCP - General    Chief Complaint:  Follow-up on possible COPD, respiratory failure and pulmonary nodule    Interval History:    He continues to feet better.  He denies cough.  He has shortness of breath on ambulation.  He remains on oxygen 2 L/m.         Ventilator/Non-Invasive Ventilation Settings     None            Vital Signs  Temp:  [97.4 °F (36.3 °C)-98.1 °F (36.7 °C)] 98.1 °F (36.7 °C)  Heart Rate:  [101-116] 116  Resp:  [18-22] 18  BP: ()/(47-82) 109/54    Intake/Output Summary (Last 24 hours) at 03/21/18 1758  Last data filed at 03/21/18 1722   Gross per 24 hour   Intake             1380 ml   Output             2175 ml   Net             -795 ml     Flowsheet Rows    Flowsheet Row First Filed Value   Admission Height 182.9 cm (72\") Documented at 03/19/2018 0030   Admission Weight 118 kg (260 lb) Documented at 03/19/2018 0030          Physical Exam:   General Appearance:    Alert, cooperative, in no acute distress   Lungs:    Bilateral basal crackles.  No wheezing on today's exam     Heart:    Regular rhythm and normal rate, normal S1 and S2, no            murmur, no gallop, no rub, no click   Chest Wall:    No abnormalities observed   Abdomen:     Obese.  Soft.  No tenderness    Neuro:   Conscious, alert, oriented x3   Extremities:   Moves all extremities well, no edema, no cyanosis, no             Redness          Results Review:          Results from last 7 days  Lab Units 03/21/18  0333 03/20/18  0316 03/19/18  0921   SODIUM mmol/L 141 140 141   POTASSIUM mmol/L 4.0 4.0 4.1   CHLORIDE mmol/L 95* 95* 97*   CO2 mmol/L 33.6* 33.6* 32.2*   BUN mg/dL 32* 26* 22   CREATININE mg/dL 1.25 1.13 1.24   GLUCOSE mg/dL 107* 100* 166*   CALCIUM mg/dL 8.8 9.3 9.4       Results from last 7 days  Lab Units 03/20/18  0003 03/19/18  1830 03/19/18  1211   TROPONIN T ng/mL <0.010 <0.010 <0.010       Results from last 7 " days  Lab Units 03/20/18  0316 03/19/18  0033   WBC 10*3/mm3 6.87 5.25   HEMOGLOBIN g/dL 12.6* 12.3*   HEMATOCRIT % 42.2 41.1   PLATELETS 10*3/mm3 174 171       Results from last 7 days  Lab Units 03/19/18  0033   INR  1.15*   APTT seconds 27.7       Results from last 7 days  Lab Units 03/21/18  1434   PROBNP pg/mL 938.2       I reviewed the patient's new clinical results.  I personally viewed and interpreted the patient's CXR        Medication Review:     apixaban 5 mg Oral Q12H   aspirin 81 mg Oral Daily   azithromycin 500 mg Intravenous Q24H   ceftriaxone 1 g Intravenous Q24H   cholecalciferol 5,000 Units Oral Daily   ezetimibe-simvastatin  Oral Nightly   furosemide 40 mg Oral BID   guaiFENesin 600 mg Oral BID   insulin aspart 0-7 Units Subcutaneous 4x Daily With Meals & Nightly   metoprolol tartrate 50 mg Oral Q12H   pantoprazole 40 mg Oral Q AM   [START ON 3/22/2018] pioglitazone 45 mg Oral Daily   potassium chloride 20 mEq Oral Daily       Diagnostic imaging:  I personally and independently reviewed the following images:   Bilateral pleural effusion. DREW pulmonary nodules, 1 cm. Bilateral GG nodules/opacities               Assessment      Assessment and Plan:  1. Acute Systolic CHF EF 40%   2. Atrial fibrillation/flutter  3. Acute hypoxic respiratory failure, 2ary to #1 and #4  4. Likely COPD, unclear if he has acute exacerbation  5. DREW pulmonary nodule, 1 cm  6. Bilateral pleural effusion  7. Mild pulmonary hypertension per echo (RVSP=35-45 mmHg), [facility] cardiac +/- COPD realted  8. Prior history of benign lung tumor, s/p resection many years ago     · Agree with current bronchodilators. Would need an outpatient PFT.  · No evidence of pneumonia but may have bronchitis. Consider stopping Rocephin. Continue Azithromycin for bronchitis. Symptoms appear to be predominantly related to cardiac decompensation since he does not have productive cough. Consider gentle diuresis.   · Would definitely benefit from  outpatient PSG  · Needs repeat CT chest in 3 months. He's at royce risk of lung cancer. I expressed my concerns about the DREW nodule. This could potentially be benign, (scar secondary to previous resection ?)      Cindy Robertson MD  03/21/18  5:58 PM          This note was dictated utilizing [employer] dictation

## 2018-03-21 NOTE — PROGRESS NOTES
Asked to perform ANSELMO in preparation for possible cardioversion.      He states he feels much better, but his POX is currently 93-97% on 6L oxygen, and his SBP is  mm Hg.  I will give a gentle dose of sedation but if he doesn't tolerate it, I have a low threshold to reschedule with anesthesiology.    Add:    I gave him 1mg Versed and 25mcg Fentanyl -- marked apnea, desats to 88%, unable to pass probe, SBP in 90s.  I can't give more sedation with this clinical status.

## 2018-03-21 NOTE — PROGRESS NOTES
"[facility]  Cardiology Progress Note    Patient Identification:  Name: Noel Garcia  Age: 77 y.o.  Sex: male  :  1940  MRN: [id number]                 Follow Up / Chief Complaint: Atrial fibrillation    Interval History:  77-year-old male with the atrial fibrillation congestive heart failure underwent a stress test today which is normal     Subjective:  Shortness of breath much better    Objective:    Past Medical History:  Past Medical History:   Diagnosis Date   • Benign essential hypertension    • Benign prostatic hyperplasia 2016   • Diabetes mellitus    • Gastroesophageal reflux disease 2016   • GERD (gastroesophageal reflux disease)    • Hamartoma of lung    • Hyperlipidemia    • Microalbuminuria    • Obesity    • Prostatic hyperplasia    • Type 2 diabetes mellitus    • Vitamin D deficiency      Past Surgical History:  Past Surgical History:   Procedure Laterality Date   • ADENOIDECTOMY     • BACK SURGERY      EXCISION MELANOMA   • COLONOSCOPY Bilateral 2016    Dr. Brunner-\"normal\" according to the patient   • HERNIA REPAIR     • LUNG SURGERY Left    • TONSILLECTOMY     • UPPER GASTROINTESTINAL ENDOSCOPY Bilateral 2016    Dr. Foss-esophageal dilation according to the patient.        Social History:   Social History   Substance Use Topics   • Smoking status: Former Smoker   • Smokeless tobacco: Not on file   • Alcohol use Yes      Comment: SOCIAL      Family History:  Family History   Problem Relation Age of Onset   • Hypertension Father    • Coronary artery disease Father    • Diabetes Father    • Hyperlipidemia Father    • Coronary artery disease Brother           Allergies:  Allergies   Allergen Reactions   • Gemfibrozil Unknown (See Comments)     Patient doesn't remember    • Atorvastatin Rash     Scheduled Meds:    aspirin 325 mg Daily   azithromycin 500 mg Q24H   ceftriaxone 1 g Q24H   cholecalciferol 5,000 Units Daily   enoxaparin 1 mg/kg Q12H "   ezetimibe-simvastatin  Nightly   furosemide 40 mg Q12H   guaiFENesin 600 mg BID   insulin aspart 0-7 Units 4x Daily With Meals & Nightly   metoprolol tartrate 50 mg Q12H   pantoprazole 40 mg Q AM   pioglitazone 45 mg BID With Meals           INTAKE AND OUTPUT:    Intake/Output Summary (Last 24 hours) at 03/21/18 1400  Last data filed at 03/21/18 0500   Gross per 24 hour   Intake             1400 ml   Output             2725 ml   Net            -1325 ml       Review of Systems:   GI:  Cardiac:No chest pains  Pulmonary: Shortness of breath    Constitutional:  Temp:  [97.4 °F (36.3 °C)-97.9 °F (36.6 °C)] 97.7 °F (36.5 °C)  Heart Rate:  [101-111] 109  Resp:  [18-22] 22  BP: ()/(47-82) 117/68    Physical Exam:  General:  Appears in no acute distress  Eyes: PERTL,  HEENT:  No JVD. Thyroid not visibly enlarged. No mucosal pallor or cyanosis  Respiratory: Respirations regular and unlabored at rest. BBS with good air entry in all fields. No crackles, rubs or wheezes auscultated  Cardiovascular: S1S2 IRRegular rate and rhythm. No murmur, rub or gallop. No carotid bruits. DP/PT pulses     . No pretibial pitting edema  Gastrointestinal: Abdomen soft, flat, non tender. Bowel sounds present. No hepatosplenomegaly. No ascites  Musculoskeletal: LEWIS x4. No abnormal movements  Extremities: No digital clubbing or cyanosis  Skin: Color pink. Skin warm and dry to touch. No rashes    Neuro: AAO x3 CN II-XII grossly intact  Psych: Mood and affect normal, pleasant and cooperative          Cardiographics  Telemetry:     ECG:     Echocardiogram:     Lab Review     Results from last 7 days  Lab Units 03/20/18  0003 03/19/18  1830 03/19/18  1211   TROPONIN T ng/mL <0.010 <0.010 <0.010       Results from last 7 days  Lab Units 03/19/18  0921   MAGNESIUM mg/dL 2.3       Results from last 7 days  Lab Units 03/21/18  0333   SODIUM mmol/L 141   POTASSIUM mmol/L 4.0   BUN mg/dL 32*   CREATININE mg/dL 1.25   CALCIUM mg/dL 8.8  "    @LABRCNTIPbnp@    Results from last 7 days  Lab Units 03/20/18  0316 03/19/18  0033   WBC 10*3/mm3 6.87 5.25   HEMOGLOBIN g/dL 12.6* 12.3*   HEMATOCRIT % 42.2 41.1   PLATELETS 10*3/mm3 174 171       Results from last 7 days  Lab Units 03/19/18  0033   INR  1.15*   APTT seconds 27.7         Assessment:  Patient Active Problem List   Diagnosis   • Vitamin D deficiency   • Benign essential hypertension   • Diabetes mellitus   • Gastroesophageal reflux disease   • Hyperlipidemia   • Hypogonadism in male   • Microalbuminuria   • Adiposity   • Benign prostatic hyperplasia   • Uncontrolled type 2 diabetes mellitus   • Cerebrovascular accident   • Elevated lipids   • Hamartoma   • Hypertension   • Malignant melanoma   • New onset atrial fibrillation           Plan:    Continue diuretics as well as anticoagulation and proceed with a ANSELMO cardioversion in the morning    Atrial fibrillation rate is controlled    CHF better    Blood pressure better      )3/21/2018  MD MICHELE Romero/Transcription:   \"Dictated utilizing Dragon dictation\".     "

## 2018-03-21 NOTE — PROGRESS NOTES
"Daily progress note    Chief complaint  Doing same  No specific complaints  Unable to do ANSELMO    History of present illness  77-year-old white male with history of diabetes hypertension hyperlipidemia COPD BPH gastroesophageal reflux disease admitted through emergency room to cardiology service with shortness of breath for one week.  Patient also have nonproductive cough.  Patient denies any chest pain but has gained about 20-30 pounds over last 2-3 months.  Patient evaluated in ER found to be in rapid atrial fibrillation and congestive heart failure admitted for management.  I'm asked to follow the patient for medical problem.  At the time of interview he still short of breath but no chest pain palpitation.  Patient denies any fever but has nonproductive cough for long time.     REVIEW OF SYSTEMS  Review of Systems   Constitutional: Positive for unexpected weight change. Negative for activity change, appetite change and fever.   HENT: Negative for congestion and sore throat.    Eyes: Negative.    Respiratory: Positive for shortness of breath. Negative for cough.    Cardiovascular: Positive for leg swelling. Negative for chest pain.   Gastrointestinal: Negative for abdominal pain, diarrhea and vomiting.   Endocrine: Negative.    Genitourinary: Negative for decreased urine volume and dysuria.   Musculoskeletal: Negative for neck pain.   Skin: Negative for rash and wound.   Allergic/Immunologic: Negative.    Neurological: Negative for weakness, numbness and headaches.   Hematological: Negative.    Psychiatric/Behavioral: Negative.    All other systems reviewed and are negative.     PHYSICAL EXAM  Blood pressure 117/68, pulse 109, temperature 97.7 °F (36.5 °C), temperature source Oral, resp. rate 22, height 182.9 cm (72\"), weight 113 kg (250 lb 1.6 oz), SpO2 97 %.    Constitutional: He is oriented to person, place, and time. He appears distressed (respiratory).   Head: Normocephalic and atraumatic.   Eyes: EOM are " normal. Pupils are equal, round, and reactive to light.   Neck: Normal range of motion. Neck supple.   Cardiovascular: Regular rhythm and normal heart sounds.  Tachycardia present.    Pulmonary/Chest: He is in respiratory distress (mild). He has rhonchi in the right lower field.   Abdominal: Soft. There is no tenderness. There is no rebound and no guarding.   Musculoskeletal: Normal range of motion. He exhibits edema (2+ pitting, BLE).   Neurological: He is alert and oriented to person, place, and time. He has normal sensation and normal strength.   Skin: Skin is warm and dry.   Psychiatric: Mood and affect normal.     LAB RESULTS  Lab Results (last 24 hours)     Procedure Component Value Units Date/Time    POC Glucose Once [[id number]]  (Normal) Collected:  03/21/18 1046    Specimen:  Blood Updated:  03/21/18 1047     Glucose 114 mg/dL     Narrative:       Meter: [id number] : [id number] Chino ARMSTRONG    Respiratory Culture - Sputum, Cough [[id number]] Collected:  03/19/18 1547    Specimen:  Sputum from Cough Updated:  03/21/18 0912     Respiratory Culture --      Heavy growth (4+) Normal Respiratory [place]     Gram Stain Result Rare (1+) WBCs seen      Rare (1+) Epithelial cells per low power field      Mixed bacterial morphotypes seen on Gram Stain    POC Glucose Once [[id number]]  (Abnormal) Collected:  03/21/18 0523    Specimen:  Blood Updated:  03/21/18 0524     Glucose 131 (H) mg/dL     Narrative:       Meter: [id number] : [id number] Alex Gama CNA    Basic Metabolic Panel [[id number]]  (Abnormal) Collected:  03/21/18 0333    Specimen:  Blood Updated:  03/21/18 0419     Glucose 107 (H) mg/dL      BUN 32 (H) mg/dL      Creatinine 1.25 mg/dL      Sodium 141 mmol/L      Potassium 4.0 mmol/L      Chloride 95 (L) mmol/L      CO2 33.6 (H) mmol/L      Calcium 8.8 mg/dL      eGFR Non African Amer 56 (L) mL/min/1.73      BUN/Creatinine Ratio 25.6 (H)     Anion Gap 12.4 mmol/L     Narrative:       The MDRD GFR  formula is only valid for adults with stable renal function between ages 18 and 70.    POC Glucose Once [[id number]]  (Abnormal) Collected:  03/20/18 1952    Specimen:  Blood Updated:  03/20/18 1953     Glucose 269 (H) mg/dL     Narrative:       Meter: [id number] : [id number] Villalba Lanette CNA    POC Glucose Once [[id number]]  (Abnormal) Collected:  03/20/18 1901    Specimen:  Blood Updated:  03/20/18 1903     Glucose 262 (H) mg/dL     Narrative:       Meter: [id number] : [id number] Cruz Frazier RN    POC Glucose Once [[id number]]  (Abnormal) Collected:  03/20/18 1638    Specimen:  Blood Updated:  03/20/18 1640     Glucose 156 (H) mg/dL     Narrative:       Meter: [id number] : [id number] Guy Murillo PATTI        Imaging Results (last 24 hours)     ** No results found for the last 24 hours. **             EKG                              Rhythm/Rate: A-flutter 131, PAC's present   P waves and NV: absent   QRS, axis: nml   ST and T waves: nml     Stress Cardiolite showed no ischemia  2-D echo showed ejection fraction 40%      Current Facility-Administered Medications:   •  acetaminophen (TYLENOL) tablet 650 mg, 650 mg, Oral, Q4H PRN, EMILY Adams  •  apixaban (ELIQUIS) tablet 5 mg, 5 mg, Oral, Q12H, Can Carr MD  •  aspirin EC tablet 81 mg, 81 mg, Oral, Daily, Can Carr MD  •  azithromycin (ZITHROMAX) 500 mg in sodium chloride 0.9 % 250 mL IVPB, 500 mg, Intravenous, Q24H, Godfrey North MD, 500 mg at 03/20/18 1749  •  cefTRIAXone (ROCEPHIN) IVPB 1 g, 1 g, Intravenous, Q24H, Gofdrey North MD, Last Rate: 100 mL/hr at 03/20/18 1643, 1 g at 03/20/18 1643  •  cholecalciferol (VITAMIN D3) tablet 5,000 Units, 5,000 Units, Oral, Daily, EMILY Adams, 5,000 Units at 03/20/18 1231  •  dextrose (D50W) solution 25 g, 25 g, Intravenous, Q15 Min PRN, Can Carr MD  •  dextrose (GLUTOSE) oral gel 15 g, 15 g, Oral, Q15 Min PRN, Can Carr MD  •  ezetimibe-simvastatin  (VYTORIN) 10-40 MG per tablet, , Oral, Nightly, Can Carr MD  •  furosemide (LASIX) tablet 40 mg, 40 mg, Oral, BID, Can Carr MD  •  glucagon (human recombinant) (GLUCAGEN DIAGNOSTIC) injection 1 mg, 1 mg, Subcutaneous, PRN, Can Carr MD  •  guaiFENesin (MUCINEX) 12 hr tablet 600 mg, 600 mg, Oral, BID, Godfrey North MD, Stopped at 03/21/18 0909  •  insulin aspart (novoLOG) injection 0-7 Units, 0-7 Units, Subcutaneous, 4x Daily With Meals & Nightly, Can Carr MD, 4 Units at 03/20/18 2100  •  ipratropium-albuterol (DUO-NEB) nebulizer solution 3 mL, 3 mL, Nebulization, Q4H PRN, Godfrey North MD  •  Magnesium Sulfate 2 gram infusion - Mg less than or equal to 1.5 mg/dL, 2 g, Intravenous, PRN **OR** Magesium Sulfate 1 gram infusion - Mg 1.6-1.9 mg/dL, 1 g, Intravenous, PRN, Niecy Arguelles, APRN  •  metoprolol tartrate (LOPRESSOR) tablet 50 mg, 50 mg, Oral, Q12H, Niecy Arguelles, APRN, 50 mg at 03/21/18 0541  •  nitroglycerin (NITROSTAT) SL tablet 0.4 mg, 0.4 mg, Sublingual, Q5 Min PRN, Niecy Arguelles, APRN  •  pantoprazole (PROTONIX) EC tablet 40 mg, 40 mg, Oral, Q AM, Niecy Arguelles, APRN, 40 mg at 03/21/18 0541  •  pioglitazone (ACTOS) tablet 45 mg, 45 mg, Oral, BID With Meals, Stopped at 03/21/18 0728 **AND** [DISCONTINUED] metFORMIN (GLUCOPHAGE) tablet 850 mg, 850 mg, Oral, BID With Meals, Niecy Arguelles, APRN, 850 mg at 03/19/18 1044  •  potassium chloride (MICRO-K) CR capsule 40 mEq, 40 mEq, Oral, PRN **OR** potassium chloride (KLOR-CON) packet 40 mEq, 40 mEq, Oral, PRN **OR** potassium chloride 10 mEq in 100 mL IVPB, 10 mEq, Intravenous, Q1H PRN, EMILY Adams  •  potassium chloride (MICRO-K) CR capsule 20 mEq, 20 mEq, Oral, Daily, Can Carr MD  •  sodium chloride 0.9 % flush 1-10 mL, 1-10 mL, Intravenous, PRN, Niecy Arguelles, EMILY  •  Insert peripheral IV, , , Once **AND** sodium chloride 0.9 % flush 10 mL, 10 mL, Intravenous, PRN, Mayo Glez MD      ASSESSMENT  Acute bronchitis  COPD exacerbation  New onset A. fib with rapid ventricular rate  Acute hypoxic respiratory failure  New-onset congestive heart failure  Diabetes mellitus  Hypertension  Hyperlipidemia  Obesity  Gastroesophageal reflux disease  BPH    PLAN  CPM  Supplement oxygen nebulizer and antibiotics   Stop Glucophage and increase Actos  Accu-Chek sliding scale insulin  Diuresis per cardiology  Stress ulcer DVT prophylaxis  Supportive care  Discussed with wife   Will follow     ANDREW PACHECO MD

## 2018-03-21 NOTE — PLAN OF CARE
Problem: Patient Care Overview  Goal: Plan of Care Review  Outcome: Ongoing (interventions implemented as appropriate)    Goal: Individualization and Mutuality  Outcome: Ongoing (interventions implemented as appropriate)    Goal: Discharge Needs Assessment  Outcome: Ongoing (interventions implemented as appropriate)    Goal: Interprofessional Rounds/Family Conf  Outcome: Ongoing (interventions implemented as appropriate)      Problem: Cardiac Output Decreased (Adult)  Goal: Identify Related Risk Factors and Signs and Symptoms  Outcome: Ongoing (interventions implemented as appropriate)    Goal: Effective Tissue Perfusion  Outcome: Ongoing (interventions implemented as appropriate)      Problem: Fluid Volume Excess (Adult)  Goal: Identify Related Risk Factors and Signs and Symptoms  Outcome: Ongoing (interventions implemented as appropriate)    Goal: Optimal Fluid Balance  Outcome: Ongoing (interventions implemented as appropriate)      Problem: Cardiac Rhythm Management Device (Adult)  Goal: Signs and Symptoms of Listed Potential Problems Will be Absent, Minimized or Managed (Cardiac Rhythm Management Device)  Outcome: Ongoing (interventions implemented as appropriate)      Problem: Fall Risk (Adult)  Goal: Identify Related Risk Factors and Signs and Symptoms  Outcome: Ongoing (interventions implemented as appropriate)    Goal: Absence of Fall  Outcome: Ongoing (interventions implemented as appropriate)

## 2018-03-22 VITALS
HEIGHT: 72 IN | WEIGHT: 248 LBS | SYSTOLIC BLOOD PRESSURE: 128 MMHG | DIASTOLIC BLOOD PRESSURE: 73 MMHG | RESPIRATION RATE: 20 BRPM | BODY MASS INDEX: 33.59 KG/M2 | HEART RATE: 108 BPM | TEMPERATURE: 97.2 F | OXYGEN SATURATION: 94 %

## 2018-03-22 LAB
ANION GAP SERPL CALCULATED.3IONS-SCNC: 13.7 MMOL/L
BUN BLD-MCNC: 31 MG/DL (ref 8–23)
BUN/CREAT SERPL: 24 (ref 7–25)
CALCIUM SPEC-SCNC: 9.1 MG/DL (ref 8.6–10.5)
CHLORIDE SERPL-SCNC: 96 MMOL/L (ref 98–107)
CO2 SERPL-SCNC: 32.3 MMOL/L (ref 22–29)
CREAT BLD-MCNC: 1.29 MG/DL (ref 0.76–1.27)
GFR SERPL CREATININE-BSD FRML MDRD: 54 ML/MIN/1.73
GLUCOSE BLD-MCNC: 120 MG/DL (ref 65–99)
GLUCOSE BLDC GLUCOMTR-MCNC: 154 MG/DL (ref 70–130)
GLUCOSE BLDC GLUCOMTR-MCNC: 154 MG/DL (ref 70–130)
GLUCOSE BLDC GLUCOMTR-MCNC: 256 MG/DL (ref 70–130)
POTASSIUM BLD-SCNC: 3.9 MMOL/L (ref 3.5–5.2)
SODIUM BLD-SCNC: 142 MMOL/L (ref 136–145)

## 2018-03-22 PROCEDURE — 80048 BASIC METABOLIC PNL TOTAL CA: CPT | Performed by: INTERNAL MEDICINE

## 2018-03-22 PROCEDURE — 93010 ELECTROCARDIOGRAM REPORT: CPT | Performed by: INTERNAL MEDICINE

## 2018-03-22 PROCEDURE — 94799 UNLISTED PULMONARY SVC/PX: CPT

## 2018-03-22 PROCEDURE — 63710000001 INSULIN ASPART PER 5 UNITS: Performed by: INTERNAL MEDICINE

## 2018-03-22 PROCEDURE — 93005 ELECTROCARDIOGRAM TRACING: CPT | Performed by: NURSE PRACTITIONER

## 2018-03-22 PROCEDURE — 99238 HOSP IP/OBS DSCHRG MGMT 30/<: CPT | Performed by: INTERNAL MEDICINE

## 2018-03-22 PROCEDURE — 82962 GLUCOSE BLOOD TEST: CPT

## 2018-03-22 RX ORDER — AZITHROMYCIN 250 MG/1
500 TABLET, FILM COATED ORAL EVERY 24 HOURS
Status: DISCONTINUED | OUTPATIENT
Start: 2018-03-22 | End: 2018-03-22 | Stop reason: HOSPADM

## 2018-03-22 RX ORDER — PIOGLITAZONEHYDROCHLORIDE 45 MG/1
45 TABLET ORAL DAILY
Qty: 30 TABLET | Refills: 0 | Status: SHIPPED | OUTPATIENT
Start: 2018-03-23 | End: 2018-07-12

## 2018-03-22 RX ORDER — METOPROLOL TARTRATE 50 MG/1
50 TABLET, FILM COATED ORAL EVERY 12 HOURS SCHEDULED
Qty: 60 TABLET | Refills: 6 | Status: SHIPPED | OUTPATIENT
Start: 2018-03-22 | End: 2018-05-11 | Stop reason: SDUPTHER

## 2018-03-22 RX ORDER — FUROSEMIDE 40 MG/1
40 TABLET ORAL 2 TIMES DAILY
Qty: 60 TABLET | Refills: 6 | Status: SHIPPED | OUTPATIENT
Start: 2018-03-22 | End: 2018-05-11 | Stop reason: SDUPTHER

## 2018-03-22 RX ORDER — POTASSIUM CHLORIDE 750 MG/1
20 CAPSULE, EXTENDED RELEASE ORAL DAILY
Qty: 30 CAPSULE | Refills: 6 | Status: SHIPPED | OUTPATIENT
Start: 2018-03-23 | End: 2018-05-11 | Stop reason: SDUPTHER

## 2018-03-22 RX ADMIN — GUAIFENESIN 600 MG: 600 TABLET, EXTENDED RELEASE ORAL at 08:19

## 2018-03-22 RX ADMIN — INSULIN ASPART 4 UNITS: 100 INJECTION, SOLUTION INTRAVENOUS; SUBCUTANEOUS at 13:15

## 2018-03-22 RX ADMIN — PIOGLITAZONE 45 MG: 30 TABLET ORAL at 13:14

## 2018-03-22 RX ADMIN — METOPROLOL TARTRATE 50 MG: 50 TABLET, FILM COATED ORAL at 06:07

## 2018-03-22 RX ADMIN — ASPIRIN 81 MG: 81 TABLET ORAL at 08:18

## 2018-03-22 RX ADMIN — POTASSIUM CHLORIDE 20 MEQ: 750 CAPSULE, EXTENDED RELEASE ORAL at 08:19

## 2018-03-22 RX ADMIN — PANTOPRAZOLE SODIUM 40 MG: 40 TABLET, DELAYED RELEASE ORAL at 06:07

## 2018-03-22 RX ADMIN — VITAMIN D, TAB 1000IU (100/BT) 5000 UNITS: 25 TAB at 08:19

## 2018-03-22 RX ADMIN — FUROSEMIDE 40 MG: 40 TABLET ORAL at 08:18

## 2018-03-22 RX ADMIN — APIXABAN 5 MG: 5 TABLET, FILM COATED ORAL at 08:18

## 2018-03-22 NOTE — DISCHARGE SUMMARY
Date of Discharge:  3/22/2018    Discharge Diagnosis:   New onset atrial flutter  Congestive heart failure new-onset  Bronchitis pneumonia    Presenting Problem/History of Present Illness  New onset atrial fibrillation [I48.91]  Acute respiratory failure with hypoxia [J96.01]  Acute congestive heart failure, unspecified congestive heart failure type [I50.9]        Hospital Course  Patient is a 77 y.o. male presented with increasing shortness of breath in emergency room was found to have new onset of atrial fibrillation with congestive heart failure, she was admitted to the hospital with a repeat EKGs as well as cardiac enzymes within normal limits, heart rate was controlled with the beta blockers, Lovenox anticoagulation was started which was switched over to the Eliquis.      Echocardiogram revealed patient has LV dysfunction, considering congestive heart failure with atrial fibrillation it was decided to proceed with ANSELMO with cardioversion, unfortunately he could not be done because of the patient's unable to swallow the ANSELMO probe    Has been decided to continue the anticoagulation for one month prior to proceed with cardioversion    At the time of the discharge room air patient has oxygen saturation of 86% patient is being discharged with 2 L oxygen, patient has a history of smoking which was stopped approximately 5 years ago    CT of the chest has shown no pulmonary embolism but there was a lung nodule 1 cm being followed by pulmonary        Procedures Performed         Consults:   Consults     Date and Time Order Name Status Description    3/19/2018 0859 Inpatient Pulmonology Consult Completed     3/19/2018 0859 Inpatient Internal Medicine Consult Completed     3/19/2018 0226 [facility] (on-call MD unless specified) Completed           Pertinent Test Results:     Ejection Fraction  Lab Results   Component Value Date    EF 40 03/20/2018       Echo EF Estimated  No results found for: ECHOEFEST    Nuclear Stress  Ejection Fraction  No components found for: NUCEF    Cath Ejection Fraction Quantitative  No results found for: CATHEF    Condition on Discharge:  Stable    Physical Exam at Discharge    Vital Signs  Temp:  [97.2 °F (36.2 °C)-98.5 °F (36.9 °C)] 97.2 °F (36.2 °C)  Heart Rate:  [] 108  Resp:  [16-20] 20  BP: (109-135)/(63-90) 128/73    Physical Exam:     General Appearance:    Alert, cooperative, in no acute distress   Head:    Normocephalic, without obvious abnormality, atraumatic   Eyes:            Lids and lashes normal, conjunctivae and sclerae normal, no   icterus, no pallor, corneas clear, PERRLA   Ears:    Ears appear intact with no abnormalities noted   Throat:   No oral lesions, no thrush, oral mucosa moist   Neck:   No adenopathy, supple, trachea midline, no thyromegaly, no   carotid bruit, no JVD   Back:     No kyphosis present, no scoliosis present, no skin lesions,      erythema or scars, no tenderness to percussion or                   palpation,   range of motion normal   Lungs:     Clear to auscultation,respirations regular, even and                  unlabored    Heart:    Regular rhythm and normal rate, normal S1 and S2, no            murmur, no gallop, no rub, no click   Chest Wall:    No abnormalities observed   Abdomen:     Normal bowel sounds, no masses, no organomegaly, soft        non-tender, non-distended, no guarding, no rebound                tenderness   Rectal:     Deferred   Extremities:   Moves all extremities well, no edema, no cyanosis, no             redness   Pulses:   Pulses palpable and equal bilaterally   Skin:   No bleeding, bruising or rash   Lymph nodes:   No palpable adenopathy   Neurologic:   Cranial nerves 2 - 12 grossly intact, sensation intact, DTR       present and equal bilaterally       Discharge Disposition  Home or Self Care    Discharge Medications   Noel Garcia   Home Medication Instructions MAYELIN:[id number]    Printed on:03/22/18 1635   Medication  Information                      ACCU-CHEK ROSETTA test strip  Check bs's 3 times daily             ACCU-CHEK FASTCLIX LANCETS misc  Check 3 times daily             apixaban (ELIQUIS) 5 MG tablet tablet  Take 1 tablet by mouth Every 12 (Twelve) Hours.             aspirin 325 MG tablet  Take 1 tablet by mouth daily.             ergocalciferol (ERGOCALCIFEROL) 32082 units capsule  Take 1 capsule twice weekly             ezetimibe-simvastatin (VYTORIN) 10-40 MG per tablet  Take 1 po daily             furosemide (LASIX) 40 MG tablet  Take 1 tablet by mouth 2 (Two) Times a Day.             Insulin Glargine 300 UNIT/ML solution pen-injector  Inject 140 Units under the skin Every Morning.             Insulin Pen Needle 31G X 5 MM misc  1 Device Daily.             INVOKANA 300 MG tablet  Take 300 mg by mouth Daily.             linagliptin (TRADJENTA) 5 MG tablet tablet  Take 1 tablet by mouth Daily.             metoprolol tartrate (LOPRESSOR) 50 MG tablet  Take 1 tablet by mouth Every 12 (Twelve) Hours.             omeprazole (priLOSEC) 20 MG capsule  Take 1 capsule by mouth  daily             pioglitazone (ACTOS) 45 MG tablet  Take 1 tablet by mouth Daily.             pioglitazone-metFORMIN (ACTOPLUS MET)  MG per tablet  Take 1 tablet by mouth 2 (Two) Times a Day.             potassium chloride (MICRO-K) 10 MEQ CR capsule  Take 2 capsules by mouth Daily.                 Discharge Diet:     Activity at Discharge:     Follow-up Appointments  Future Appointments  Date Time Provider Department Center   6/28/2018 2:40 PM [facility] None   7/12/2018 2:40 PM EMILY Borrego [facility] None         Test Results Pending at Discharge     Please make a note patient's being discharged to moderate oxygen at this point patient will be followed up by the pulmonary as an outpatient as well as will reevaluate after the cardioversion for the need for the home oxygen  Can Carr MD  03/22/18  4:33  PM    Time:

## 2018-03-22 NOTE — PLAN OF CARE
Problem: Patient Care Overview  Goal: Plan of Care Review   03/22/18 4836   Coping/Psychosocial   Plan of Care Reviewed With patient   Plan of Care Review   Progress improving   OTHER   Outcome Summary Pt rested well overnight. A flutter/fib on tele. Denies shortness of breath, denies pain. O2 2 liters nasal canula, sleep apnea overnight O2 sats decreased to lower 80's returns to baseline of 90-92%. Will monitor.

## 2018-03-22 NOTE — PROGRESS NOTES
"Daily progress note    Chief complaint  Doing same  No specific complaints    History of present illness  77-year-old white male with history of diabetes hypertension hyperlipidemia COPD BPH gastroesophageal reflux disease admitted through emergency room to cardiology service with shortness of breath for one week.  Patient also have nonproductive cough.  Patient denies any chest pain but has gained about 20-30 pounds over last 2-3 months.  Patient evaluated in ER found to be in rapid atrial fibrillation and congestive heart failure admitted for management.  I'm asked to follow the patient for medical problem.  At the time of interview he still short of breath but no chest pain palpitation.  Patient denies any fever but has nonproductive cough for long time.     REVIEW OF SYSTEMS  Review of Systems   Constitutional: Positive for unexpected weight change. Negative for activity change, appetite change and fever.   HENT: Negative for congestion and sore throat.    Eyes: Negative.    Respiratory: Positive for shortness of breath. Negative for cough.    Cardiovascular: Positive for leg swelling. Negative for chest pain.   Gastrointestinal: Negative for abdominal pain, diarrhea and vomiting.   Endocrine: Negative.    Genitourinary: Negative for decreased urine volume and dysuria.   Musculoskeletal: Negative for neck pain.   Skin: Negative for rash and wound.   Allergic/Immunologic: Negative.    Neurological: Negative for weakness, numbness and headaches.   Hematological: Negative.    Psychiatric/Behavioral: Negative.    All other systems reviewed and are negative.     PHYSICAL EXAM  Blood pressure 120/75, pulse 101, temperature 97.6 °F (36.4 °C), temperature source Oral, resp. rate 19, height 182.9 cm (72\"), weight 112 kg (248 lb), SpO2 94 %.    Constitutional: He is oriented to person, place, and time. He appears distressed (respiratory).   Head: Normocephalic and atraumatic.   Eyes: EOM are normal. Pupils are equal, " round, and reactive to light.   Neck: Normal range of motion. Neck supple.   Cardiovascular: Regular rhythm and normal heart sounds.  Tachycardia present.    Pulmonary/Chest: He is in respiratory distress (mild). He has rhonchi in the right lower field.   Abdominal: Soft. There is no tenderness. There is no rebound and no guarding.   Musculoskeletal: Normal range of motion. He exhibits edema (2+ pitting, BLE).   Neurological: He is alert and oriented to person, place, and time. He has normal sensation and normal strength.   Skin: Skin is warm and dry.   Psychiatric: Mood and affect normal.     LAB RESULTS  Lab Results (last 24 hours)     Procedure Component Value Units Date/Time    POC Glucose Once [[id number]]  (Abnormal) Collected:  03/22/18 1038    Specimen:  Blood Updated:  03/22/18 1039     Glucose 256 (H) mg/dL     Narrative:       RN Notified R and V Meter: [id number] : [id number] Tommy LOCKHART    POC Glucose Once [[id number]]  (Abnormal) Collected:  03/22/18 0556    Specimen:  Blood Updated:  03/22/18 0558     Glucose 154 (H) mg/dL     Narrative:       Meter: [id number] : [id number] Agendia NA    Basic Metabolic Panel [[id number]]  (Abnormal) Collected:  03/22/18 0338    Specimen:  Blood Updated:  03/22/18 0437     Glucose 120 (H) mg/dL      BUN 31 (H) mg/dL      Creatinine 1.29 (H) mg/dL      Sodium 142 mmol/L      Potassium 3.9 mmol/L      Chloride 96 (L) mmol/L      CO2 32.3 (H) mmol/L      Calcium 9.1 mg/dL      eGFR Non African Amer 54 (L) mL/min/1.73      BUN/Creatinine Ratio 24.0     Anion Gap 13.7 mmol/L     Narrative:       The MDRD GFR formula is only valid for adults with stable renal function between ages 18 and 70.    POC Glucose Once [[id number]]  (Abnormal) Collected:  03/1938    Specimen:  Blood Updated:  03/21/18 1939     Glucose 197 (H) mg/dL     Narrative:       Meter: [id number] : [id number] White Maranda NA    POC Glucose Once [[id number]]  (Abnormal) Collected:   03/21/18 1618    Specimen:  Blood Updated:  03/21/18 1619     Glucose 219 (H) mg/dL     Narrative:       Meter: [id number] : [id number] Chino ARMSTRONG    BNP [[id number]]  (Normal) Collected:  03/21/18 1434    Specimen:  Blood Updated:  03/21/18 1540     proBNP 938.2 pg/mL     Narrative:       Among patients with dyspnea, NT-proBNP is highly sensitive for the detection of acute congestive heart failure. In addition NT-proBNP of <300 pg/ml effectively rules out acute congestive heart failure with 99% negative predictive value.        Imaging Results (last 24 hours)     ** No results found for the last 24 hours. **             EKG                              Rhythm/Rate: A-flutter 131, PAC's present   P waves and SD: absent   QRS, axis: nml   ST and T waves: nml     Stress Cardiolite showed no ischemia  2-D echo showed ejection fraction 40%      Current Facility-Administered Medications:   •  acetaminophen (TYLENOL) tablet 650 mg, 650 mg, Oral, Q4H PRN, EMILY Adams  •  apixaban (ELIQUIS) tablet 5 mg, 5 mg, Oral, Q12H, Can Carr MD, 5 mg at 03/22/18 0818  •  aspirin EC tablet 81 mg, 81 mg, Oral, Daily, Can Carr MD, 81 mg at 03/22/18 0818  •  azithromycin (ZITHROMAX) tablet 500 mg, 500 mg, Oral, Q24H, Godfrey North MD  •  cefTRIAXone (ROCEPHIN) IVPB 1 g, 1 g, Intravenous, Q24H, Godfrey North MD, Last Rate: 100 mL/hr at 03/21/18 1647, 1 g at 03/21/18 1647  •  cholecalciferol (VITAMIN D3) tablet 5,000 Units, 5,000 Units, Oral, Daily, EMILY Adams, 5,000 Units at 03/22/18 0819  •  dextrose (D50W) solution 25 g, 25 g, Intravenous, Q15 Min PRN, Can Carr MD  •  dextrose (GLUTOSE) oral gel 15 g, 15 g, Oral, Q15 Min PRN, Can Carr MD  •  ezetimibe-simvastatin (VYTORIN) 10-40 MG per tablet, , Oral, Nightly, aCn Carr MD  •  furosemide (LASIX) tablet 40 mg, 40 mg, Oral, BID, Can Carr MD, 40 mg at 03/22/18 0818  •  glucagon (human recombinant)  (GLUCAGEN DIAGNOSTIC) injection 1 mg, 1 mg, Subcutaneous, PRN, Can Carr MD  •  guaiFENesin (MUCINEX) 12 hr tablet 600 mg, 600 mg, Oral, BID, Godfrey North MD, 600 mg at 03/22/18 0819  •  insulin aspart (novoLOG) injection 0-7 Units, 0-7 Units, Subcutaneous, 4x Daily With Meals & Nightly, Can Carr MD, 4 Units at 03/22/18 1315  •  ipratropium-albuterol (DUO-NEB) nebulizer solution 3 mL, 3 mL, Nebulization, Q4H PRN, Godfrey North MD  •  Magnesium Sulfate 2 gram infusion - Mg less than or equal to 1.5 mg/dL, 2 g, Intravenous, PRN **OR** Magesium Sulfate 1 gram infusion - Mg 1.6-1.9 mg/dL, 1 g, Intravenous, PRN, Niecy Arguelles, APRN  •  metoprolol tartrate (LOPRESSOR) tablet 50 mg, 50 mg, Oral, Q12H, Niecy Arguelles, APRN, 50 mg at 03/22/18 0607  •  nitroglycerin (NITROSTAT) SL tablet 0.4 mg, 0.4 mg, Sublingual, Q5 Min PRN, Niecy Arguelles, APRN  •  pantoprazole (PROTONIX) EC tablet 40 mg, 40 mg, Oral, Q AM, Niecy Arguelles, APRN, 40 mg at 03/22/18 0607  •  pioglitazone (ACTOS) tablet 45 mg, 45 mg, Oral, Daily, 45 mg at 03/22/18 1314 **AND** [DISCONTINUED] metFORMIN (GLUCOPHAGE) tablet 850 mg, 850 mg, Oral, BID With Meals, Niecy Arguelles, APRN, 850 mg at 03/19/18 1044  •  potassium chloride (MICRO-K) CR capsule 40 mEq, 40 mEq, Oral, PRN **OR** potassium chloride (KLOR-CON) packet 40 mEq, 40 mEq, Oral, PRN **OR** potassium chloride 10 mEq in 100 mL IVPB, 10 mEq, Intravenous, Q1H PRN, Niecy Arguelles, APRN  •  potassium chloride (MICRO-K) CR capsule 20 mEq, 20 mEq, Oral, Daily, Can Carr MD, 20 mEq at 03/22/18 0819  •  sodium chloride 0.9 % flush 1-10 mL, 1-10 mL, Intravenous, PRN, EMILY Adams  •  Insert peripheral IV, , , Once **AND** sodium chloride 0.9 % flush 10 mL, 10 mL, Intravenous, PRN, Mayo Glez MD     ASSESSMENT  Acute bronchitis  COPD exacerbation  New onset A. fib with rapid ventricular rate  Acute hypoxic respiratory failure  New-onset congestive heart failure  Diabetes  mellitus  Hypertension  Hyperlipidemia  Obesity  Gastroesophageal reflux disease  BPH    PLAN  CPM  Supplement oxygen nebulizer   Stop abx once ready for discharge  Stop Glucophage and increase Actos  Accu-Chek sliding scale insulin  Diuresis per cardiology  Stress ulcer DVT prophylaxis  Supportive care  Discussed with wife   Will follow     ANDREW PACHECO MD

## 2018-03-22 NOTE — PROGRESS NOTES
"Discharge Planning Assessment  [facility]     Patient Name: Noel Garcia  MRN: [id number]  Today's Date: 3/22/2018    Admit Date: 3/19/2018          Discharge Needs Assessment     Row Name 03/22/18 1612       Living Environment    Lives With spouse    Name(s) of Who Lives With Patient Evelyn Garcia spouse ([phone])    Current Living Arrangements home/apartment/condo    Primary Care Provided by self    Family Caregiver if Needed none    Quality of Family Relationships involved;supportive;helpful    Able to Return to Prior Arrangements yes       Resource/Environmental Concerns    Resource/Environmental Concerns none       Transition Planning    Patient/Family Anticipates Transition to home    Patient/Family Anticipated Services at Transition respiratory services    Transportation Anticipated family or friend will provide       Discharge Needs Assessment    Concerns to be Addressed other (see comments)   home oxygen set up    Equipment Currently Used at Home none    Equipment Needed After Discharge oxygen    Offered/Gave Vendor List no            Discharge Plan     Row Name 03/22/18 4624       Plan    Plan Home with [facility] supplying home oxygen.    Patient/Family in Agreement with Plan yes    Plan Comments Spoke with Pt and his spouse Evelyn Garcia ([phone]) at bedside.  CCP introduced self and role.  Pt confirmed information on face sheet.  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  Pt denies past home health, subacute rehab and DME.  Pt confirms pharmacy as [facility].  Pt denies issues with affording his medications.  Pt resting oxygen saturation per nursing note is 86%.  CCP s/w Pt and spouse about the possibility of Pt discharging on home oxygen.  Pulmonary Md has not rounded yet today.  Pt spouse chose [facility], stating, \"That's where I get my CPAP supplies from.\"  CCP call [facility] liaison [phone] at 4:04 PM to give her a heads up that Pt will be discharging on home oxygen.  Pt " plans to return home at discharge with spouse transporting.  Pt denies any other d/c needs.  CCP will continue to follow…………….VISHAL DODD/CCP        Destination     No service coordination in this encounter.      Durable Medical Equipment - Selection Complete     Service Request Status Selected Specialties Address Phone Number Fax Number    [facility] Services [address], [facility] [address] [phone]      Dialysis/Infusion     No service coordination in this encounter.      Home Medical Care     No service coordination in this encounter.      Social Care     No service coordination in this encounter.                Demographic Summary     Row Name 03/22/18 1612       General Information    Admission Type inpatient    Arrived From home    Required Notices Provided Important Message from Medicare    Referral Source admission list    Reason for Consult discharge planning    Preferred Language English     Used During This Interaction no       Contact Information    Permission Granted to Share Info With family/designee            Functional Status     Row Name 03/22/18 1612       Functional Status    Usual Activity Tolerance good    Current Activity Tolerance good       Functional Status, IADL    Medications independent    Meal Preparation independent    Housekeeping independent    Laundry independent    Shopping independent       Mental Status    General Appearance WDL WDL       Mental Status Summary    Recent Changes in Mental Status/Cognitive Functioning no changes       Employment/    Employment Status retired            Psychosocial    No documentation.           Abuse/Neglect    No documentation.           Legal    No documentation.           Substance Abuse    No documentation.           Patient Forms    No documentation.         Yazmin Martinez RN

## 2018-03-22 NOTE — PROGRESS NOTES
"                                              LOS: 3 days   Patient Care Team:  Peyman Encarnacion MD as PCP - General    Chief Complaint:  Follow-up on possible COPD, respiratory failure and pulmonary nodule    Interval History:    O2 was 84% on room air today.  He qualifies for oxygen 2 L/m.  He feels better overall with no shortness of breath at rest but on exertion. No cough       Ventilator/Non-Invasive Ventilation Settings     None            Vital Signs  Temp:  [97.2 °F (36.2 °C)-98.5 °F (36.9 °C)] 97.2 °F (36.2 °C)  Heart Rate:  [] 108  Resp:  [16-20] 20  BP: (109-135)/(63-90) 128/73    Intake/Output Summary (Last 24 hours) at 03/22/18 1701  Last data filed at 03/22/18 0438   Gross per 24 hour   Intake              610 ml   Output                0 ml   Net              610 ml     Flowsheet Rows    Flowsheet Row First Filed Value   Admission Height 182.9 cm (72\") Documented at 03/19/2018 0030   Admission Weight 118 kg (260 lb) Documented at 03/19/2018 0030          Physical Exam:   General Appearance:    Alert, cooperative, in no acute distress   Lungs:    Bilateral basal crackles.  No wheezing on today's exam     Heart:    Regular rhythm and normal rate, normal S1 and S2, no            murmur, no gallop, no rub, no click   Chest Wall:    No abnormalities observed   Abdomen:     Obese.  Soft.  No tenderness    Neuro:   Conscious, alert, oriented x3   Extremities:   Moves all extremities well, no edema, no cyanosis, no             Redness          Results Review:          Results from last 7 days  Lab Units 03/22/18  0338 03/21/18  0333 03/20/18  0316   SODIUM mmol/L 142 141 140   POTASSIUM mmol/L 3.9 4.0 4.0   CHLORIDE mmol/L 96* 95* 95*   CO2 mmol/L 32.3* 33.6* 33.6*   BUN mg/dL 31* 32* 26*   CREATININE mg/dL 1.29* 1.25 1.13   GLUCOSE mg/dL 120* 107* 100*   CALCIUM mg/dL 9.1 8.8 9.3       Results from last 7 days  Lab Units 03/20/18  0003 03/19/18  1830 03/19/18  1211   TROPONIN T ng/mL <0.010 <0.010 " <0.010       Results from last 7 days  Lab Units 03/20/18  0316 03/19/18  0033   WBC 10*3/mm3 6.87 5.25   HEMOGLOBIN g/dL 12.6* 12.3*   HEMATOCRIT % 42.2 41.1   PLATELETS 10*3/mm3 174 171       Results from last 7 days  Lab Units 03/19/18  0033   INR  1.15*   APTT seconds 27.7       Results from last 7 days  Lab Units 03/21/18  1434   PROBNP pg/mL 938.2       I reviewed the patient's new clinical results.  I personally viewed and interpreted the patient's CXR        Medication Review:     apixaban 5 mg Oral Q12H   aspirin 81 mg Oral Daily   azithromycin 500 mg Oral Q24H   ceftriaxone 1 g Intravenous Q24H   cholecalciferol 5,000 Units Oral Daily   ezetimibe-simvastatin  Oral Nightly   furosemide 40 mg Oral BID   guaiFENesin 600 mg Oral BID   insulin aspart 0-7 Units Subcutaneous 4x Daily With Meals & Nightly   metoprolol tartrate 50 mg Oral Q12H   pantoprazole 40 mg Oral Q AM   pioglitazone 45 mg Oral Daily   potassium chloride 20 mEq Oral Daily       Diagnostic imaging:  I personally and independently reviewed the following images:   Bilateral pleural effusion. DREW pulmonary nodules, 1 cm. Bilateral GG nodules/opacities               Assessment      Assessment and Plan:  1. Acute Systolic CHF EF 40%   2. Atrial fibrillation/flutter  3. Acute hypoxic respiratory failure, 2ary to #1 and #4  4. Likely COPD, unclear if he has acute exacerbation  5. DREW pulmonary nodule, 1 cm  6. Bilateral pleural effusion  7. Mild pulmonary hypertension per echo (RVSP=35-45 mmHg), [facility] cardiac +/- COPD realted  8. Prior history of benign lung tumor, s/p resection many years ago     · Agree with diuresis  · Discharge home on oxygen  · We will arrange for outpatient PFT  · Would benefit from outpatient PSG  · Needs repeat CT chest in 3 months. He's at royce risk of lung cancer. I expressed my concerns about the DREW nodule. This could potentially be benign, (scar secondary to previous resection ?)      Cindy Robertson MD  03/22/18  5:01  PM          This note was dictated utilizing Michael dictation

## 2018-03-27 ENCOUNTER — OFFICE VISIT (OUTPATIENT)
Dept: ENDOCRINOLOGY | Age: 78
End: 2018-03-27

## 2018-03-27 VITALS
WEIGHT: 250 LBS | BODY MASS INDEX: 33.86 KG/M2 | DIASTOLIC BLOOD PRESSURE: 68 MMHG | SYSTOLIC BLOOD PRESSURE: 116 MMHG | HEIGHT: 72 IN

## 2018-03-27 DIAGNOSIS — I10 ESSENTIAL HYPERTENSION: ICD-10-CM

## 2018-03-27 DIAGNOSIS — E55.9 VITAMIN D DEFICIENCY: ICD-10-CM

## 2018-03-27 DIAGNOSIS — E11.65 TYPE 2 DIABETES MELLITUS WITH HYPERGLYCEMIA, UNSPECIFIED LONG TERM INSULIN USE STATUS: ICD-10-CM

## 2018-03-27 DIAGNOSIS — E29.1 HYPOGONADISM IN MALE: ICD-10-CM

## 2018-03-27 DIAGNOSIS — E78.5 HYPERLIPIDEMIA, UNSPECIFIED HYPERLIPIDEMIA TYPE: ICD-10-CM

## 2018-03-27 DIAGNOSIS — I10 BENIGN ESSENTIAL HYPERTENSION: Primary | ICD-10-CM

## 2018-03-27 PROCEDURE — 99214 OFFICE O/P EST MOD 30 MIN: CPT | Performed by: NURSE PRACTITIONER

## 2018-03-27 RX ORDER — INSULIN GLARGINE 300 U/ML
130 INJECTION, SOLUTION SUBCUTANEOUS DAILY
Qty: 9 PEN | Refills: 11
Start: 2018-03-27 | End: 2018-07-13 | Stop reason: SDUPTHER

## 2018-03-27 RX ORDER — LINAGLIPTIN 5 MG/1
5 TABLET, FILM COATED ORAL DAILY
Qty: 30 TABLET | Refills: 11
Start: 2018-03-27 | End: 2018-07-13 | Stop reason: SDUPTHER

## 2018-03-27 RX ORDER — LINAGLIPTIN 5 MG/1
TABLET, FILM COATED ORAL
COMMUNITY
Start: 2018-03-19 | End: 2018-03-27

## 2018-03-27 RX ORDER — INSULIN GLARGINE 300 U/ML
INJECTION, SOLUTION SUBCUTANEOUS
COMMUNITY
Start: 2018-03-19 | End: 2018-03-27

## 2018-03-27 NOTE — PROGRESS NOTES
"Subjective   Noel Garcia is a 77 y.o. male is here today for follow-up.  Chief Complaint   Patient presents with   • Diabetes     recent labs done at hospital, pt tests BG 1x daily, pt did not bring meter   • Hyperlipidemia     pt is no longer taking toujeo or tradjenta, pt was taken off of medication in the hospital    • Hypertension     pt is needing to get clarification on medications, sugars have been running high   • Vitamin D Deficiency     /68   Ht 182.9 cm (72\")   Wt 113 kg (250 lb)   BMI 33.91 kg/m²   Current Outpatient Prescriptions on File Prior to Visit   Medication Sig   • ACCU-CHEK ROSETTA test strip Check bs's 3 times daily   • ACCU-CHEK FASTCLIX LANCETS misc Check 3 times daily   • apixaban (ELIQUIS) 5 MG tablet tablet Take 1 tablet by mouth Every 12 (Twelve) Hours.   • aspirin 325 MG tablet Take 1 tablet by mouth daily.   • ergocalciferol (ERGOCALCIFEROL) 94321 units capsule Take 1 capsule twice weekly   • ezetimibe-simvastatin (VYTORIN) 10-40 MG per tablet Take 1 po daily   • furosemide (LASIX) 40 MG tablet Take 1 tablet by mouth 2 (Two) Times a Day.   • Insulin Pen Needle 31G X 5 MM misc 1 Device Daily.   • metoprolol tartrate (LOPRESSOR) 50 MG tablet Take 1 tablet by mouth Every 12 (Twelve) Hours.   • omeprazole (priLOSEC) 20 MG capsule Take 1 capsule by mouth  daily   • pioglitazone (ACTOS) 45 MG tablet Take 1 tablet by mouth Daily.   • potassium chloride (MICRO-K) 10 MEQ CR capsule Take 2 capsules by mouth Daily.     No current facility-administered medications on file prior to visit.      Family History   Problem Relation Age of Onset   • Hypertension Father    • Coronary artery disease Father    • Diabetes Father    • Hyperlipidemia Father    • Coronary artery disease Brother      Social History   Substance Use Topics   • Smoking status: Former Smoker   • Smokeless tobacco: Not on file   • Alcohol use Yes      Comment: SOCIAL     Allergies   Allergen Reactions   • Gemfibrozil " Unknown (See Comments)     Patient doesn't remember    • Atorvastatin Rash         History of Present Illness  Encounter Diagnoses   Name Primary?   • Benign essential hypertension Yes   • Hyperlipidemia, unspecified hyperlipidemia type    • Essential hypertension    • Vitamin D deficiency    • Hypogonadism in male    • Type 2 diabetes mellitus with hyperglycemia, unspecified long term insulin use status      This is a 77-year-old male patient here today for a acute visit following hospitalization for heart problems.  He was referred to cardiologist his last visit in our office for an abnormal EKG.  He states he wound up not making it to the appointment and instead had to go to the emergency room.  He was diagnosed with atrial flutter, pneumonia, and congestive heart failure.  He states all the hospital he was told to stop taking his insulin and Tradjenta.  Since being out of the hospital his blood sugars have gone significantly higher.  We discussed restarting his diabetes medications.  He had recent labs done which were reviewed and he was provided a copy.  His last hemoglobin A1c was 7.32.  He did not bring his blood glucose meter to today's visit.  He is accompanied today by his wife.  He is on nasal oxygen at least temporarily he says.  He is scheduled to have a cardio version done on his heart.  He will follow-up with his cardiologist.  His medications were reviewed at today's visit.  He does have complaints of feeling weak and does get short of breath with minimal activity.  His metformin was stopped in the hospital due to his kidney function  The following portions of the patient's history were reviewed and updated as appropriate: allergies, current medications, past family history, past medical history, past social history, past surgical history and problem list.    Review of Systems   Constitutional: Negative for fatigue.   HENT: Negative for trouble swallowing.    Eyes: Negative for visual disturbance.    Respiratory: Negative for shortness of breath.    Cardiovascular: Negative for leg swelling.   Endocrine: Negative for polyuria.   Skin: Negative for wound.   Neurological: Negative for numbness.       Objective   Physical Exam   Constitutional: He is oriented to person, place, and time. He appears well-developed and well-nourished. No distress.   HENT:   Head: Normocephalic and atraumatic.   Right Ear: External ear normal.   Left Ear: External ear normal.   Nose: Nose normal.   Eyes: Pupils are equal, round, and reactive to light. Right eye exhibits no discharge. Left eye exhibits no discharge.   Neck: Normal range of motion. Neck supple. Carotid bruit is not present. No tracheal deviation, no edema and no erythema present. No thyromegaly present.   Cardiovascular: Normal rate, regular rhythm and intact distal pulses.  Exam reveals no gallop and no friction rub.    No murmur heard.  Atrial flutter according to recent ekg   Pulmonary/Chest: Breath sounds normal. No respiratory distress. He has no wheezes. He has no rales.   On portable nasal oxygen  Recently diagnosed with pneumonia   Abdominal: Soft. Bowel sounds are normal. He exhibits no distension. There is no tenderness.   Musculoskeletal: Normal range of motion. He exhibits no edema or deformity.   Lymphadenopathy:     He has no cervical adenopathy.   Neurological: He is alert and oriented to person, place, and time. Coordination normal.   Skin: Skin is warm and dry. No rash noted. He is not diaphoretic. No erythema. No pallor.   Psychiatric: He has a normal mood and affect. His behavior is normal. Judgment and thought content normal.   Nursing note and vitals reviewed.    Lab Results   Component Value Date    HGBA1C 7.32 (H) 03/20/2018     Lab Results   Component Value Date    GLUCOSE 120 (H) 03/22/2018    BUN 31 (H) 03/22/2018    CREATININE 1.29 (H) 03/22/2018    EGFRIFNONA 54 (L) 03/22/2018    EGFRIFAFRI 73 01/17/2018    BCR 24.0 03/22/2018    K 3.9  03/22/2018    CO2 32.3 (H) 03/22/2018    CALCIUM 9.1 03/22/2018    PROTENTOTREF 6.9 01/17/2018    ALBUMIN 4.10 03/20/2018    LABIL2 1.5 03/20/2018    AST 18 03/20/2018    ALT 17 03/20/2018     Lab Results   Component Value Date    CHOL 116 03/20/2018    CHLPL 121 01/17/2018    TRIG 103 03/20/2018    HDL 33 (L) 03/20/2018    LDL 62 03/20/2018     Lab Results   Component Value Date    TSH 1.250 03/20/2018    Y0OUDSB 5.82 03/01/2017         Assessment/Plan   Problems Addressed this Visit        Cardiovascular and Mediastinum    Benign essential hypertension - Primary    Hyperlipidemia    Hypertension       Digestive    Vitamin D deficiency       Endocrine    Diabetes mellitus    Relevant Medications    TRADJENTA 5 MG tablet tablet    TOUJEO SOLOSTAR 300 UNIT/ML solution pen-injector    Canagliflozin 100 MG tablet    Hypogonadism in male      Other Visit Diagnoses    None.         In summary, patient was seen and examined.  He did not bring his blood glucose meter with him to today's visit.  He is complaining of significantly high blood sugars as result of being taken off his medications while in the hospital and being treated for his heart issues.  He has been restarted on toujeo and Tradjenta and has been advised to monitor his blood sugars closely.  I've asked that he keep me informed of his blood sugar readings so that we can make additional changes if needed.  He has plans to follow with cardiology.  He has a cardioversion scheduled for his atrial flutter.  He was recently treated for pneumonia and congestive heart failure.  He remains on nasal oxygen for now.  He is in no acute distress at today's visit.  He is accompanied by his wife.  He will follow-up in our office as scheduled in July with labs prior.  I've encouraged him to be seen sooner if needed.

## 2018-04-12 ENCOUNTER — OFFICE VISIT (OUTPATIENT)
Dept: CARDIOLOGY | Facility: CLINIC | Age: 78
End: 2018-04-12

## 2018-04-12 VITALS
WEIGHT: 250 LBS | BODY MASS INDEX: 33.91 KG/M2 | HEART RATE: 64 BPM | SYSTOLIC BLOOD PRESSURE: 126 MMHG | DIASTOLIC BLOOD PRESSURE: 79 MMHG

## 2018-04-12 DIAGNOSIS — I10 BENIGN ESSENTIAL HYPERTENSION: ICD-10-CM

## 2018-04-12 DIAGNOSIS — Z79.01 ANTICOAGULATED: Primary | ICD-10-CM

## 2018-04-12 DIAGNOSIS — I48.91 NEW ONSET ATRIAL FIBRILLATION (HCC): ICD-10-CM

## 2018-04-12 DIAGNOSIS — E78.5 HYPERLIPIDEMIA, UNSPECIFIED HYPERLIPIDEMIA TYPE: ICD-10-CM

## 2018-04-12 PROCEDURE — 99213 OFFICE O/P EST LOW 20 MIN: CPT | Performed by: INTERNAL MEDICINE

## 2018-04-12 PROCEDURE — 93000 ELECTROCARDIOGRAM COMPLETE: CPT | Performed by: INTERNAL MEDICINE

## 2018-04-12 NOTE — PROGRESS NOTES
Subjective:       Noel Garcia is a 77 y.o. male who here for follow up    CC  FOLLOW UP AFIB  HPI  77-year-old male with known history of the benign essential arterial hypertension, hyperlipidemia as well as atrial fibrillation on anticoagulation has been feeling well with no chest pains or tightness in chest, also has a history of the diabetes     Problem List Items Addressed This Visit        Cardiovascular and Mediastinum    Benign essential hypertension    Hyperlipidemia    New onset atrial fibrillation      Other Visit Diagnoses     Anticoagulated    -  Primary        .    The following portions of the patient's history were reviewed and updated as appropriate: allergies, current medications, past family history, past medical history, past social history, past surgical history and problem list.    Past Medical History:   Diagnosis Date   • Benign essential hypertension    • Benign prostatic hyperplasia 1/21/2016   • Diabetes mellitus    • Gastroesophageal reflux disease 1/21/2016   • GERD (gastroesophageal reflux disease)    • Hamartoma of lung    • Hyperlipidemia    • Microalbuminuria    • Obesity    • Prostatic hyperplasia    • Type 2 diabetes mellitus    • Vitamin D deficiency     reports that he has quit smoking. He does not have any smokeless tobacco history on file. He reports that he drinks alcohol.  Family History   Problem Relation Age of Onset   • Hypertension Father    • Coronary artery disease Father    • Diabetes Father    • Hyperlipidemia Father    • Coronary artery disease Brother        Review of Systems  Constitutional: No wt loss, fever, fatigue  Gastrointestinal: No nausea, abdominal pain  Behavioral/Psych: No insomnia or anxiety   Cardiovascular No chest pains or tightness in chest  Objective:       Physical Exam           Physical Exam  /79   Pulse 64   Wt 113 kg (250 lb)   BMI 33.91 kg/m²     General appearance: NAD, conversant   Eyes: anicteric sclerae, moist conjunctivae;  no lid-lag; PERRLA   HENT: Atraumatic; oropharynx clear with moist mucous membranes and no mucosal ulcerations;  normal hard and soft palate   Neck: Trachea midline; FROM, supple, no thyromegaly or lymphadenopathy   Lungs: CTA, with normal respiratory effort and no intercostal retractions   CV: S1-S2 regular, no murmurs, no rub, no gallop   Abdomen: Soft, non-tender; no masses or HSM   Extremities: No peripheral edema or extremity lymphadenopathy  Skin: Normal temperature, turgor and texture; no rash, ulcers or subcutaneous nodules   Psych: Appropriate affect, alert and oriented to person, place and time           Cardiographics  @  ECG 12 Lead  Date/Time: 4/12/2018 12:35 PM  Performed by: JAMES GOMEZ  Authorized by: JAMES GOMEZ   Comparison: compared with previous ECG   Similar to previous ECG  Rhythm: sinus rhythm  Clinical impression: non-specific ECG            Echocardiogram:  No chest pains or tightness in chest      Current Outpatient Prescriptions:   •  ACCU-CHEK ROSETTA test strip, Check bs's 3 times daily, Disp: 300 each, Rfl: 1  •  ACCU-CHEK FASTCLIX LANCETS misc, Check 3 times daily, Disp: 300 each, Rfl: 1  •  apixaban (ELIQUIS) 5 MG tablet tablet, Take 1 tablet by mouth Every 12 (Twelve) Hours., Disp: 60 tablet, Rfl: 6  •  aspirin 325 MG tablet, Take 1 tablet by mouth daily., Disp: , Rfl:   •  Canagliflozin 100 MG tablet, Take 1 po daily, Disp: 90 tablet, Rfl: 1  •  ergocalciferol (ERGOCALCIFEROL) 22947 units capsule, Take 1 capsule twice weekly, Disp: 24 capsule, Rfl: 1  •  ezetimibe-simvastatin (VYTORIN) 10-40 MG per tablet, Take 1 po daily, Disp: 90 tablet, Rfl: 1  •  furosemide (LASIX) 40 MG tablet, Take 1 tablet by mouth 2 (Two) Times a Day., Disp: 60 tablet, Rfl: 6  •  Insulin Pen Needle 31G X 5 MM misc, 1 Device Daily., Disp: 100 each, Rfl: 1  •  metoprolol tartrate (LOPRESSOR) 50 MG tablet, Take 1 tablet by mouth Every 12 (Twelve) Hours., Disp: 60 tablet, Rfl: 6  •  omeprazole  (priLOSEC) 20 MG capsule, Take 1 capsule by mouth  daily, Disp: 90 capsule, Rfl: 1  •  pioglitazone (ACTOS) 45 MG tablet, Take 1 tablet by mouth Daily., Disp: 30 tablet, Rfl: 0  •  potassium chloride (MICRO-K) 10 MEQ CR capsule, Take 2 capsules by mouth Daily., Disp: 30 capsule, Rfl: 6  •  TOUJEO SOLOSTAR 300 UNIT/ML solution pen-injector, Inject 130 Units under the skin Daily., Disp: 9 pen, Rfl: 11  •  TRADJENTA 5 MG tablet tablet, Take 1 tablet by mouth Daily., Disp: 30 tablet, Rfl: 11   Assessment:        Patient Active Problem List   Diagnosis   • Vitamin D deficiency   • Benign essential hypertension   • Diabetes mellitus   • Gastroesophageal reflux disease   • Hyperlipidemia   • Hypogonadism in male   • Microalbuminuria   • Adiposity   • Benign prostatic hyperplasia   • Uncontrolled type 2 diabetes mellitus   • Cerebrovascular accident   • Elevated lipids   • Hamartoma   • Hypertension   • Malignant melanoma   • New onset atrial fibrillation     Presenting Problem/History of Present Illness  New onset atrial fibrillation [I48.91]  Acute respiratory failure with hypoxia [J96.01]  Acute congestive heart failure, unspecified congestive heart failure type [I50.9]       Interpretation Summary     · Left ventricular ejection fraction is mildly reduced (Calculated EF = 40%).  · Myocardial perfusion imaging indicates a normal myocardial perfusion study with no evidence of ischemia.  · Impressions are consistent with a low risk study.              Plan:            ICD-10-CM ICD-9-CM   1. Anticoagulated Z79.01 V58.61   2. Benign essential hypertension I10 401.1   3. Hyperlipidemia, unspecified hyperlipidemia type E78.5 272.4   4. New onset atrial fibrillation I48.91 427.31     1. Anticoagulated  Counseling has been done    2. Benign essential hypertension  The blood pressure is under control    3. Hyperlipidemia, unspecified hyperlipidemia type  Counseling has been done    4. New onset atrial fibrillation  Patient  converted to normal sinus rhythm       PT CONVERTED TO NSR , NO CV     SEE IN 3 MONTHS  COUNSELING:    Noel Scruggs was given to patient for the following topics: diagnostic results, risk factor reductions, impressions, risks and benefits of treatment options and importance of treatment compliance .       SMOKING COUNSELING:    Counseling given: Not Answered      EMR Dragon/Transcription disclaimer:   Much of this encounter note is an electronic transcription/translation of spoken language to printed text. The electronic translation of spoken language may permit erroneous, or at times, nonsensical words or phrases to be inadvertently transcribed; Although I have reviewed the note for such errors, some may still exist.

## 2018-05-02 ENCOUNTER — TRANSCRIBE ORDERS (OUTPATIENT)
Dept: ADMINISTRATIVE | Facility: HOSPITAL | Age: 78
End: 2018-05-02

## 2018-05-02 DIAGNOSIS — R91.1 PULMONARY NODULE: Primary | ICD-10-CM

## 2018-05-11 RX ORDER — METOPROLOL TARTRATE 50 MG/1
50 TABLET, FILM COATED ORAL EVERY 12 HOURS SCHEDULED
Qty: 180 TABLET | Refills: 1 | Status: SHIPPED | OUTPATIENT
Start: 2018-05-11 | End: 2018-07-12

## 2018-05-11 RX ORDER — POTASSIUM CHLORIDE 750 MG/1
20 CAPSULE, EXTENDED RELEASE ORAL DAILY
Qty: 180 CAPSULE | Refills: 1 | Status: SHIPPED | OUTPATIENT
Start: 2018-05-11 | End: 2018-11-12

## 2018-05-11 RX ORDER — FUROSEMIDE 40 MG/1
40 TABLET ORAL 2 TIMES DAILY
Qty: 180 TABLET | Refills: 1 | Status: SHIPPED | OUTPATIENT
Start: 2018-05-11 | End: 2018-05-15 | Stop reason: SDUPTHER

## 2018-05-15 RX ORDER — FUROSEMIDE 40 MG/1
40 TABLET ORAL 2 TIMES DAILY
Qty: 180 TABLET | Refills: 1 | Status: SHIPPED | OUTPATIENT
Start: 2018-05-15 | End: 2018-11-11

## 2018-05-16 DIAGNOSIS — E55.9 VITAMIN D DEFICIENCY: ICD-10-CM

## 2018-05-16 DIAGNOSIS — N40.0 BENIGN NON-NODULAR PROSTATIC HYPERPLASIA WITHOUT LOWER URINARY TRACT SYMPTOMS: ICD-10-CM

## 2018-05-16 DIAGNOSIS — E66.9 OBESITY, UNSPECIFIED CLASSIFICATION, UNSPECIFIED OBESITY TYPE, UNSPECIFIED WHETHER SERIOUS COMORBIDITY PRESENT: ICD-10-CM

## 2018-05-16 DIAGNOSIS — I10 BENIGN ESSENTIAL HYPERTENSION: ICD-10-CM

## 2018-05-16 DIAGNOSIS — E11.65 TYPE 2 DIABETES MELLITUS WITH HYPERGLYCEMIA, UNSPECIFIED LONG TERM INSULIN USE STATUS: ICD-10-CM

## 2018-05-16 DIAGNOSIS — E29.1 HYPOGONADISM IN MALE: ICD-10-CM

## 2018-05-16 DIAGNOSIS — E78.5 HYPERLIPIDEMIA, UNSPECIFIED HYPERLIPIDEMIA TYPE: ICD-10-CM

## 2018-05-16 DIAGNOSIS — R80.9 MICROALBUMINURIA: ICD-10-CM

## 2018-06-22 DIAGNOSIS — E55.9 VITAMIN D DEFICIENCY: ICD-10-CM

## 2018-06-22 DIAGNOSIS — IMO0002 UNCONTROLLED TYPE 2 DIABETES MELLITUS WITH COMPLICATION, WITH LONG-TERM CURRENT USE OF INSULIN: Primary | ICD-10-CM

## 2018-06-28 ENCOUNTER — LAB (OUTPATIENT)
Dept: ENDOCRINOLOGY | Age: 78
End: 2018-06-28

## 2018-06-28 DIAGNOSIS — IMO0002 UNCONTROLLED TYPE 2 DIABETES MELLITUS WITH COMPLICATION, WITH LONG-TERM CURRENT USE OF INSULIN: ICD-10-CM

## 2018-06-28 DIAGNOSIS — E55.9 VITAMIN D DEFICIENCY: ICD-10-CM

## 2018-06-28 RX ORDER — OMEPRAZOLE 20 MG/1
CAPSULE, DELAYED RELEASE ORAL
Qty: 90 CAPSULE | Refills: 0 | Status: SHIPPED | OUTPATIENT
Start: 2018-06-28 | End: 2020-10-22 | Stop reason: SDUPTHER

## 2018-06-29 LAB
25(OH)D3+25(OH)D2 SERPL-MCNC: 47.2 NG/ML (ref 30–100)
ALBUMIN SERPL-MCNC: 4.5 G/DL (ref 3.5–5.2)
ALBUMIN/GLOB SERPL: 2 G/DL
ALP SERPL-CCNC: 80 U/L (ref 39–117)
ALT SERPL-CCNC: 14 U/L (ref 1–41)
AST SERPL-CCNC: 14 U/L (ref 1–40)
BILIRUB SERPL-MCNC: 0.5 MG/DL (ref 0.1–1.2)
BUN SERPL-MCNC: 17 MG/DL (ref 8–23)
BUN/CREAT SERPL: 12.8 (ref 7–25)
C PEPTIDE SERPL-MCNC: 1.1 NG/ML (ref 1.1–4.4)
CALCIUM SERPL-MCNC: 9.6 MG/DL (ref 8.6–10.5)
CHLORIDE SERPL-SCNC: 101 MMOL/L (ref 98–107)
CHOLEST SERPL-MCNC: 125 MG/DL (ref 0–200)
CO2 SERPL-SCNC: 29.7 MMOL/L (ref 22–29)
CREAT SERPL-MCNC: 1.33 MG/DL (ref 0.76–1.27)
GLOBULIN SER CALC-MCNC: 2.2 GM/DL
GLUCOSE SERPL-MCNC: 111 MG/DL (ref 65–99)
HBA1C MFR BLD: 10.98 % (ref 4.8–5.6)
HDLC SERPL-MCNC: 33 MG/DL (ref 40–60)
INTERPRETATION: NORMAL
LDLC SERPL CALC-MCNC: 66 MG/DL (ref 0–100)
Lab: NORMAL
MICROALBUMIN UR-MCNC: <3 UG/ML
POTASSIUM SERPL-SCNC: 4.9 MMOL/L (ref 3.5–5.2)
PROT SERPL-MCNC: 6.7 G/DL (ref 6–8.5)
SODIUM SERPL-SCNC: 144 MMOL/L (ref 136–145)
TRIGL SERPL-MCNC: 132 MG/DL (ref 0–150)
VLDLC SERPL CALC-MCNC: 26.4 MG/DL (ref 5–40)

## 2018-07-05 ENCOUNTER — OFFICE VISIT (OUTPATIENT)
Dept: CARDIOLOGY | Facility: CLINIC | Age: 78
End: 2018-07-05

## 2018-07-05 VITALS
BODY MASS INDEX: 33.46 KG/M2 | DIASTOLIC BLOOD PRESSURE: 66 MMHG | WEIGHT: 247 LBS | HEART RATE: 66 BPM | HEIGHT: 72 IN | SYSTOLIC BLOOD PRESSURE: 130 MMHG

## 2018-07-05 DIAGNOSIS — I48.91 NEW ONSET ATRIAL FIBRILLATION (HCC): ICD-10-CM

## 2018-07-05 DIAGNOSIS — I10 BENIGN ESSENTIAL HYPERTENSION: ICD-10-CM

## 2018-07-05 DIAGNOSIS — I10 ESSENTIAL HYPERTENSION: ICD-10-CM

## 2018-07-05 DIAGNOSIS — E78.5 HYPERLIPIDEMIA, UNSPECIFIED HYPERLIPIDEMIA TYPE: Primary | ICD-10-CM

## 2018-07-05 PROCEDURE — 99213 OFFICE O/P EST LOW 20 MIN: CPT | Performed by: INTERNAL MEDICINE

## 2018-07-05 NOTE — PROGRESS NOTES
" Subjective:       Noel Garcia is a 78 y.o. male who here for follow up    CC  Atrial fibrillation follow-up  HPI  78 years old male with the atrial fibrillation benign essential arterial hypertension hyperlipidemia here for the follow-up after the stress test and echocardiogram has been doing well denies any chest pains or tightness in chest     Problem List Items Addressed This Visit        Cardiovascular and Mediastinum    Benign essential hypertension    Hyperlipidemia - Primary    Relevant Orders    Lipid Panel    Comprehensive Metabolic Panel    Hypertension    New onset atrial fibrillation (CMS/HCC)        .    The following portions of the patient's history were reviewed and updated as appropriate: allergies, current medications, past family history, past medical history, past social history, past surgical history and problem list.    Past Medical History:   Diagnosis Date   • Benign essential hypertension    • Benign prostatic hyperplasia 1/21/2016   • Diabetes mellitus (CMS/HCC)    • Gastroesophageal reflux disease 1/21/2016   • GERD (gastroesophageal reflux disease)    • Hamartoma of lung (CMS/HCC)    • Hyperlipidemia    • Microalbuminuria    • Obesity    • Prostatic hyperplasia    • Type 2 diabetes mellitus (CMS/HCC)    • Vitamin D deficiency     reports that he has quit smoking. He has never used smokeless tobacco. He reports that he does not drink alcohol or use drugs.  Family History   Problem Relation Age of Onset   • Hypertension Father    • Coronary artery disease Father    • Diabetes Father    • Hyperlipidemia Father    • Coronary artery disease Brother        Review of Systems  Constitutional: No wt loss, fever, fatigue  Gastrointestinal: No nausea, abdominal pain  Behavioral/Psych: No insomnia or anxiety   Cardiovascular No chest pains or tightness in chest  Objective:       Physical Exam           Physical Exam  /66   Pulse 66   Ht 182.9 cm (72\")   Wt 112 kg (247 lb)   BMI 33.50 " kg/m²     General appearance: NAD, conversant   Eyes: anicteric sclerae, moist conjunctivae; no lid-lag; PERRLA   HENT: Atraumatic; oropharynx clear with moist mucous membranes and no mucosal ulcerations;  normal hard and soft palate   Neck: Trachea midline; FROM, supple, no thyromegaly or lymphadenopathy   Lungs: CTA, with normal respiratory effort and no intercostal retractions   CV: S1-S2 regular, no murmurs, no rub, no gallop   Abdomen: Soft, non-tender; no masses or HSM   Extremities: No peripheral edema or extremity lymphadenopathy  Skin: Normal temperature, turgor and texture; no rash, ulcers or subcutaneous nodules   Psych: Appropriate affect, alert and oriented to person, place and time           Cardiographics  @Procedures    Echocardiogram:        Current Outpatient Prescriptions:   •  ACCU-CHEK FASTCLIX LANCETS misc, Check 3 times daily, Disp: 300 each, Rfl: 1  •  apixaban (ELIQUIS) 5 MG tablet tablet, Take 1 tablet by mouth Every 12 (Twelve) Hours., Disp: 180 tablet, Rfl: 1  •  aspirin 325 MG tablet, Take 1 tablet by mouth daily., Disp: , Rfl:   •  Canagliflozin 100 MG tablet, Take 1 po daily, Disp: 90 tablet, Rfl: 1  •  ergocalciferol (ERGOCALCIFEROL) 75432 units capsule, Take 1 capsule twice weekly, Disp: 24 capsule, Rfl: 1  •  ezetimibe-simvastatin (VYTORIN) 10-40 MG per tablet, Take 1 po daily, Disp: 90 tablet, Rfl: 1  •  furosemide (LASIX) 40 MG tablet, Take 1 tablet by mouth 2 (Two) Times a Day for 180 days., Disp: 180 tablet, Rfl: 1  •  glucose blood (ACCU-CHEK GUIDE) test strip, Use to test BG 3x daily, Disp: 300 each, Rfl: 0  •  Insulin Pen Needle 31G X 5 MM misc, Use to inject insulin 1x daily, Disp: 100 each, Rfl: 0  •  metoprolol tartrate (LOPRESSOR) 50 MG tablet, Take 1 tablet by mouth Every 12 (Twelve) Hours., Disp: 180 tablet, Rfl: 1  •  omeprazole (priLOSEC) 20 MG capsule, TAKE 1 CAPSULE BY MOUTH  DAILY, Disp: 90 capsule, Rfl: 0  •  pioglitazone (ACTOS) 45 MG tablet, Take 1 tablet by  mouth Daily., Disp: 30 tablet, Rfl: 0  •  potassium chloride (MICRO-K) 10 MEQ CR capsule, Take 2 capsules by mouth Daily., Disp: 180 capsule, Rfl: 1  •  TOUJEO SOLOSTAR 300 UNIT/ML solution pen-injector, Inject 130 Units under the skin Daily., Disp: 9 pen, Rfl: 11  •  TRADJENTA 5 MG tablet tablet, Take 1 tablet by mouth Daily., Disp: 30 tablet, Rfl: 11   Assessment:        Patient Active Problem List   Diagnosis   • Vitamin D deficiency   • Benign essential hypertension   • Diabetes mellitus (CMS/HCC)   • Gastroesophageal reflux disease   • Hyperlipidemia   • Hypogonadism in male   • Microalbuminuria   • Adiposity   • Benign prostatic hyperplasia   • Uncontrolled type 2 diabetes mellitus (CMS/HCC)   • Cerebrovascular accident (CMS/HCC)   • Elevated lipids   • Hamartoma (CMS/HCC)   • Hypertension   • Malignant melanoma (CMS/HCC)   • New onset atrial fibrillation (CMS/HCC)               Plan:            ICD-10-CM ICD-9-CM   1. Hyperlipidemia, unspecified hyperlipidemia type E78.5 272.4   2. New onset atrial fibrillation (CMS/HCC) I48.91 427.31   3. Essential hypertension I10 401.9   4. Benign essential hypertension I10 401.1     1. Hyperlipidemia, unspecified hyperlipidemia type  Counseling has been done  - Lipid Panel; Future  - Comprehensive Metabolic Panel; Future    2. New onset atrial fibrillation (CMS/HCC)  Under control    3. Essential hypertension  Blood pressure under control        Still in afib    See in 6 month    Reduce lasix to 1 day and than half  Reduce kcl one a day    See months with echo  COUNSELING:    Noel Scruggs was given to patient for the following topics: diagnostic results, risk factor reductions, impressions, risks and benefits of treatment options and importance of treatment compliance .       SMOKING COUNSELING:    Counseling given: Not Answered      EMR Dragon/Transcription disclaimer:   Much of this encounter note is an electronic transcription/translation of spoken  language to printed text. The electronic translation of spoken language may permit erroneous, or at times, nonsensical words or phrases to be inadvertently transcribed; Although I have reviewed the note for such errors, some may still exist.

## 2018-07-12 ENCOUNTER — OFFICE VISIT (OUTPATIENT)
Dept: ENDOCRINOLOGY | Age: 78
End: 2018-07-12

## 2018-07-12 VITALS
BODY MASS INDEX: 33.67 KG/M2 | HEIGHT: 72 IN | WEIGHT: 248.6 LBS | SYSTOLIC BLOOD PRESSURE: 90 MMHG | DIASTOLIC BLOOD PRESSURE: 62 MMHG

## 2018-07-12 DIAGNOSIS — I10 BENIGN ESSENTIAL HYPERTENSION: ICD-10-CM

## 2018-07-12 DIAGNOSIS — E78.5 HYPERLIPIDEMIA, UNSPECIFIED HYPERLIPIDEMIA TYPE: ICD-10-CM

## 2018-07-12 DIAGNOSIS — E29.1 HYPOGONADISM IN MALE: ICD-10-CM

## 2018-07-12 DIAGNOSIS — E55.9 VITAMIN D DEFICIENCY: ICD-10-CM

## 2018-07-12 DIAGNOSIS — E11.65 TYPE 2 DIABETES MELLITUS WITH HYPERGLYCEMIA, UNSPECIFIED LONG TERM INSULIN USE STATUS: Primary | ICD-10-CM

## 2018-07-12 DIAGNOSIS — R80.9 MICROALBUMINURIA: ICD-10-CM

## 2018-07-12 PROCEDURE — 99214 OFFICE O/P EST MOD 30 MIN: CPT | Performed by: NURSE PRACTITIONER

## 2018-07-12 RX ORDER — GLIMEPIRIDE 4 MG/1
8 TABLET ORAL EVERY MORNING
Qty: 60 TABLET | Refills: 5 | Status: SHIPPED | OUTPATIENT
Start: 2018-07-12 | End: 2018-11-12

## 2018-07-12 RX ORDER — GLIMEPIRIDE 4 MG/1
8 TABLET ORAL EVERY MORNING
Qty: 60 TABLET | Refills: 5 | Status: SHIPPED | OUTPATIENT
Start: 2018-07-12 | End: 2018-07-12 | Stop reason: SDUPTHER

## 2018-07-12 RX ORDER — EMPAGLIFLOZIN 25 MG/1
1 TABLET, FILM COATED ORAL DAILY
Qty: 30 TABLET | Refills: 5 | Status: SHIPPED | OUTPATIENT
Start: 2018-07-12 | End: 2019-01-22 | Stop reason: SDUPTHER

## 2018-07-12 NOTE — PATIENT INSTRUCTIONS
Check blood sugars more often  Let office know readings  Stop actos due to history of CHF  Stop invokana  Start jardiance 25 mg which is better for heart  Continue toujeo insulin  Start amaryl 4 mg 2 pills daily in morning with food. May cause low blood sugars so monitor closely

## 2018-07-12 NOTE — PROGRESS NOTES
"Subjective   Noel Garcia is a 78 y.o. male is here today for follow-up.  Chief Complaint   Patient presents with   • Diabetes     recent labs, pt tests BG 1x daily, pt did not bring meter   • Hypogonadism   • Hyperlipidemia   • Hypertension   • Vitamin D Deficiency     BP 90/62   Ht 182.9 cm (72\")   Wt 113 kg (248 lb 9.6 oz)   BMI 33.72 kg/m²   Current Outpatient Prescriptions on File Prior to Visit   Medication Sig   • ACCU-CHEK FASTCLIX LANCETS misc Check 3 times daily   • apixaban (ELIQUIS) 5 MG tablet tablet Take 1 tablet by mouth Every 12 (Twelve) Hours.   • aspirin 325 MG tablet Take 1 tablet by mouth daily.   • ergocalciferol (ERGOCALCIFEROL) 73915 units capsule Take 1 capsule twice weekly   • ezetimibe-simvastatin (VYTORIN) 10-40 MG per tablet Take 1 po daily   • furosemide (LASIX) 40 MG tablet Take 1 tablet by mouth 2 (Two) Times a Day for 180 days. (Patient taking differently: Take 40 mg by mouth Daily.)   • glucose blood (ACCU-CHEK GUIDE) test strip Use to test BG 3x daily   • Insulin Pen Needle 31G X 5 MM misc Use to inject insulin 1x daily   • omeprazole (priLOSEC) 20 MG capsule TAKE 1 CAPSULE BY MOUTH  DAILY   • potassium chloride (MICRO-K) 10 MEQ CR capsule Take 2 capsules by mouth Daily.   • TOUJEO SOLOSTAR 300 UNIT/ML solution pen-injector Inject 130 Units under the skin Daily. (Patient taking differently: Inject 140 Units under the skin Daily.)   • TRADJENTA 5 MG tablet tablet Take 1 tablet by mouth Daily.   • [DISCONTINUED] Canagliflozin 100 MG tablet Take 1 po daily   • [DISCONTINUED] pioglitazone (ACTOS) 45 MG tablet Take 1 tablet by mouth Daily.   • [DISCONTINUED] metoprolol tartrate (LOPRESSOR) 50 MG tablet Take 1 tablet by mouth Every 12 (Twelve) Hours.     No current facility-administered medications on file prior to visit.      Family History   Problem Relation Age of Onset   • Hypertension Father    • Coronary artery disease Father    • Diabetes Father    • Hyperlipidemia Father  "   • Coronary artery disease Brother      Social History   Substance Use Topics   • Smoking status: Former Smoker   • Smokeless tobacco: Never Used   • Alcohol use No     Allergies   Allergen Reactions   • Gemfibrozil Unknown (See Comments)     Patient doesn't remember    • Atorvastatin Rash         History of Present Illness  Encounter Diagnoses   Name Primary?   • Benign essential hypertension    • Hyperlipidemia, unspecified hyperlipidemia type    • Vitamin D deficiency    • Hypogonadism in male    • Type 2 diabetes mellitus with hyperglycemia, unspecified long term insulin use status (CMS/[facility], [facility]) Yes   • Microalbuminuria    78-year-old male patient here today for a follow-up visit.  He is being seen for the above-mentioned diagnoses.  He did not bring his blood glucose meter and states he is only checking his blood sugars one time daily.  His glycemic control has worsened since his last visit.  Patient is a poor historian regarding any changes in blood sugars or why his blood sugars might have declined.  He does think this might be related to diet.  Medication list was reviewed and updated at today's visit.  He has a history of congestive heart failure and is currently weighing every day to monitor his weight.  About a new glucose meter at today's visit.  Due to his history of congestive heart failure we will stop invokana and start on Jardiance.  His labs and history were reviewed with Dr. Mccrary    The following portions of the patient's history were reviewed and updated as appropriate: allergies, current medications, past family history, past medical history, past social history, past surgical history and problem list.    Review of Systems   Constitutional: Negative for fatigue.   HENT: Negative for trouble swallowing.    Eyes: Negative for visual disturbance.   Respiratory: Negative for shortness of breath.    Cardiovascular: Negative for leg swelling.   Endocrine: Negative for polyuria.   Skin: Negative for  wound.   Neurological: Negative for numbness.       Objective   Physical Exam   Constitutional: He is oriented to person, place, and time. He appears well-developed and well-nourished. No distress.   HENT:   Head: Normocephalic and atraumatic.   Right Ear: External ear normal.   Left Ear: External ear normal.   Nose: Nose normal.   Eyes: Pupils are equal, round, and reactive to light. Right eye exhibits no discharge. Left eye exhibits no discharge.   Neck: Normal range of motion. Neck supple. Carotid bruit is not present. No tracheal deviation, no edema and no erythema present. No thyromegaly present.   Cardiovascular: Normal rate, regular rhythm and intact distal pulses.  Exam reveals no gallop and no friction rub.    No murmur heard.  Atrial flutter according to recent ekg   Pulmonary/Chest: Breath sounds normal. No respiratory distress. He has no wheezes. He has no rales.   On portable nasal oxygen  Recently diagnosed with pneumonia   Abdominal: Soft. Bowel sounds are normal. He exhibits no distension. There is no tenderness.   Musculoskeletal: Normal range of motion. He exhibits no edema or deformity.   Lymphadenopathy:     He has no cervical adenopathy.   Neurological: He is alert and oriented to person, place, and time. Coordination normal.   Skin: Skin is warm and dry. No rash noted. He is not diaphoretic. No erythema. No pallor.   Psychiatric: He has a normal mood and affect. His behavior is normal. Judgment and thought content normal.   Nursing note and vitals reviewed.    Results for orders placed or performed in visit on 06/28/18   Comprehensive Metabolic Panel   Result Value Ref Range    Glucose 111 (H) 65 - 99 mg/dL    BUN 17 8 - 23 mg/dL    Creatinine 1.33 (H) 0.76 - 1.27 mg/dL    eGFR Non African Am 52 (L) >60 mL/min/1.73    eGFR African Am 63 >60 mL/min/1.73    BUN/Creatinine Ratio 12.8 7.0 - 25.0    Sodium 144 136 - 145 mmol/L    Potassium 4.9 3.5 - 5.2 mmol/L    Chloride 101 98 - 107 mmol/L     Total CO2 29.7 (H) 22.0 - 29.0 mmol/L    Calcium 9.6 8.6 - 10.5 mg/dL    Total Protein 6.7 6.0 - 8.5 g/dL    Albumin 4.50 3.50 - 5.20 g/dL    Globulin 2.2 gm/dL    A/G Ratio 2.0 g/dL    Total Bilirubin 0.5 0.1 - 1.2 mg/dL    Alkaline Phosphatase 80 39 - 117 U/L    AST (SGOT) 14 1 - 40 U/L    ALT (SGPT) 14 1 - 41 U/L   C-Peptide   Result Value Ref Range    C-Peptide 1.1 1.1 - 4.4 ng/mL   Hemoglobin A1c   Result Value Ref Range    Hemoglobin A1C 10.98 (H) 4.80 - 5.60 %   Lipid Panel   Result Value Ref Range    Total Cholesterol 125 0 - 200 mg/dL    Triglycerides 132 0 - 150 mg/dL    HDL Cholesterol 33 (L) 40 - 60 mg/dL    VLDL Cholesterol 26.4 5 - 40 mg/dL    LDL Cholesterol  66 0 - 100 mg/dL   Vitamin D 25 Hydroxy   Result Value Ref Range    25 Hydroxy, Vitamin D 47.2 30.0 - 100.0 ng/ml   MicroAlbumin, Urine, Random   Result Value Ref Range    Microalbumin, Urine <3.0 Not Estab. ug/mL   Cardiovascular Risk Assessment   Result Value Ref Range    Interpretation Note    Diabetes Patient Education   Result Value Ref Range    PDF Image Not applicable          Assessment/Plan   Problems Addressed this Visit        Cardiovascular and Mediastinum    Benign essential hypertension    Hyperlipidemia       Digestive    Vitamin D deficiency       Endocrine    Diabetes mellitus (CMS/[facility]) - Primary    Relevant Medications    JARDIANCE 25 MG tablet    glimepiride (AMARYL) 4 MG tablet    Hypogonadism in male       Genitourinary    Microalbuminuria        In summary, patient was seen and examined.  His diabetes management has declined based on his recent hemoglobin A1c.  He does have a history of congestive heart failure so I did discuss his lab results with Dr. Mccrary as changes have been made as listed below.  He does have concerns regarding causes some his medications.  We discussed his options and patient was educated today on medications mechanism of action as well as potential side effects.  He's been advised to make better  choices with his diet to improve his glycemic control.  Check blood sugars more often  Let office know readings  Stop actos due to history of CHF  Stop invokana  Start jardiance 25 mg which is better for heart  Continue toujeo insulin  Start amaryl 4 mg 2 pills daily in morning with food. May cause low blood sugars so monitor closely

## 2018-07-13 DIAGNOSIS — N40.0 BENIGN NON-NODULAR PROSTATIC HYPERPLASIA WITHOUT LOWER URINARY TRACT SYMPTOMS: ICD-10-CM

## 2018-07-13 DIAGNOSIS — E29.1 HYPOGONADISM IN MALE: ICD-10-CM

## 2018-07-13 DIAGNOSIS — I10 BENIGN ESSENTIAL HYPERTENSION: ICD-10-CM

## 2018-07-13 DIAGNOSIS — R80.9 MICROALBUMINURIA: ICD-10-CM

## 2018-07-13 DIAGNOSIS — E11.65 TYPE 2 DIABETES MELLITUS WITH HYPERGLYCEMIA, UNSPECIFIED LONG TERM INSULIN USE STATUS: ICD-10-CM

## 2018-07-13 DIAGNOSIS — E78.5 HYPERLIPIDEMIA, UNSPECIFIED HYPERLIPIDEMIA TYPE: ICD-10-CM

## 2018-07-13 DIAGNOSIS — E55.9 VITAMIN D DEFICIENCY: ICD-10-CM

## 2018-07-13 DIAGNOSIS — E66.9 OBESITY, UNSPECIFIED CLASSIFICATION, UNSPECIFIED OBESITY TYPE, UNSPECIFIED WHETHER SERIOUS COMORBIDITY PRESENT: ICD-10-CM

## 2018-07-13 RX ORDER — LINAGLIPTIN 5 MG/1
5 TABLET, FILM COATED ORAL DAILY
Qty: 90 TABLET | Refills: 1 | Status: SHIPPED | OUTPATIENT
Start: 2018-07-13 | End: 2018-11-19 | Stop reason: SDUPTHER

## 2018-07-13 RX ORDER — ERGOCALCIFEROL 1.25 MG/1
CAPSULE ORAL
Qty: 24 CAPSULE | Refills: 1 | Status: SHIPPED | OUTPATIENT
Start: 2018-07-13 | End: 2019-06-06 | Stop reason: SDUPTHER

## 2018-07-13 RX ORDER — LANCETS
EACH MISCELLANEOUS
Qty: 300 EACH | Refills: 1 | Status: SHIPPED | OUTPATIENT
Start: 2018-07-13 | End: 2019-08-02 | Stop reason: SDUPTHER

## 2018-07-13 RX ORDER — EZETIMIBE AND SIMVASTATIN 10; 40 MG/1; MG/1
TABLET ORAL
Qty: 90 TABLET | Refills: 1 | Status: SHIPPED | OUTPATIENT
Start: 2018-07-13 | End: 2019-03-26 | Stop reason: SDUPTHER

## 2018-07-13 RX ORDER — INSULIN GLARGINE 300 U/ML
140 INJECTION, SOLUTION SUBCUTANEOUS DAILY
Qty: 45 ML | Refills: 1 | Status: SHIPPED | OUTPATIENT
Start: 2018-07-13 | End: 2018-11-12

## 2018-07-13 RX ORDER — BLOOD SUGAR DIAGNOSTIC
STRIP MISCELLANEOUS
Qty: 300 EACH | Refills: 1 | Status: SHIPPED | OUTPATIENT
Start: 2018-07-13 | End: 2019-06-06 | Stop reason: SDUPTHER

## 2018-11-09 ENCOUNTER — TELEPHONE (OUTPATIENT)
Dept: ENDOCRINOLOGY | Age: 78
End: 2018-11-09

## 2018-11-09 NOTE — TELEPHONE ENCOUNTER
----- Message from EMILY Borrego sent at 11/8/2018  3:03 PM EST -----  Contact: [phone]  He needs appt asap and needs to BRING ALL MEDS AND METER.   ----- Message -----  From: Bella Caicedo MA  Sent: 11/8/2018   2:25 PM  To: EMILY Borrego    I spoke to the patient and went through medications in chart, everything is accurate. I asked the patient if his blood sugars have been high for a while and he wasn't able to really tell me anything regarding that. Please advise.  ----- Message -----  From: Divya Lopez  Sent: 11/8/2018   1:17 PM  To: Bella Caicedo MA    Went to pcp   They took blood  Glucose was 400    Patient would like a return call to discuss          Spoke to the patient and informed him that Danielle would like for him to be seen, pt is going to be seen 11/12/2018 @ 1p. Asked the patient to bring his medications and BG meter with him to the visit. The patient expressed understanding.

## 2018-11-12 ENCOUNTER — OFFICE VISIT (OUTPATIENT)
Dept: ENDOCRINOLOGY | Age: 78
End: 2018-11-12

## 2018-11-12 VITALS
BODY MASS INDEX: 33.46 KG/M2 | SYSTOLIC BLOOD PRESSURE: 130 MMHG | WEIGHT: 247 LBS | HEIGHT: 72 IN | DIASTOLIC BLOOD PRESSURE: 70 MMHG

## 2018-11-12 DIAGNOSIS — E78.5 HYPERLIPIDEMIA, UNSPECIFIED HYPERLIPIDEMIA TYPE: ICD-10-CM

## 2018-11-12 DIAGNOSIS — I10 BENIGN ESSENTIAL HYPERTENSION: ICD-10-CM

## 2018-11-12 DIAGNOSIS — E55.9 VITAMIN D DEFICIENCY: ICD-10-CM

## 2018-11-12 DIAGNOSIS — E78.5 ELEVATED LIPIDS: ICD-10-CM

## 2018-11-12 DIAGNOSIS — I10 ESSENTIAL HYPERTENSION: ICD-10-CM

## 2018-11-12 DIAGNOSIS — E29.1 HYPOGONADISM IN MALE: ICD-10-CM

## 2018-11-12 DIAGNOSIS — E11.65 UNCONTROLLED TYPE 2 DIABETES MELLITUS WITH HYPERGLYCEMIA (HCC): Primary | ICD-10-CM

## 2018-11-12 PROCEDURE — 99214 OFFICE O/P EST MOD 30 MIN: CPT | Performed by: NURSE PRACTITIONER

## 2018-11-12 NOTE — PROGRESS NOTES
"Subjective   Noel Garcia is a 78 y.o. male is here today for follow-up.  Chief Complaint   Patient presents with   • Hypothyroidism     No recent labs, pt tests BG 1x daily, pt brought meter   • Hypertension     pt went to see PCP, BG was high on blood test.    • Hyperlipidemia   • Vitamin D Deficiency   • Hypogonadism   • Diabetes     /70   Ht 182.9 cm (72\")   Wt 112 kg (247 lb)   BMI 33.50 kg/m²   Current Outpatient Medications on File Prior to Visit   Medication Sig   • ACCU-CHEK ROSETTA PLUS test strip USE 3 TIMES DAILY AS  DIRECTED   • ACCU-CHEK FASTCLIX LANCETS misc Check 3 times daily   • apixaban (ELIQUIS) 5 MG tablet tablet Take 1 tablet by mouth Every 12 (Twelve) Hours.   • aspirin 325 MG tablet Take 1 tablet by mouth daily.   • ergocalciferol (ERGOCALCIFEROL) 82344 units capsule Take 1 capsule twice weekly   • ezetimibe-simvastatin (VYTORIN) 10-40 MG per tablet Take 1 po daily   • glimepiride (AMARYL) 4 MG tablet Take 2 tablets by mouth Every Morning.   • Insulin Pen Needle 31G X 5 MM misc Use to inject insulin 1x daily   • JARDIANCE 25 MG tablet Take 25 mg by mouth Daily.   • omeprazole (priLOSEC) 20 MG capsule TAKE 1 CAPSULE BY MOUTH  DAILY   • TOUJEO SOLOSTAR 300 UNIT/ML solution pen-injector Inject 140 Units under the skin Daily.   • TRADJENTA 5 MG tablet tablet Take 1 tablet by mouth Daily.   • [] furosemide (LASIX) 40 MG tablet Take 1 tablet by mouth 2 (Two) Times a Day for 180 days. (Patient taking differently: Take 40 mg by mouth Daily.)   • [DISCONTINUED] potassium chloride (MICRO-K) 10 MEQ CR capsule Take 2 capsules by mouth Daily.     No current facility-administered medications on file prior to visit.      Family History   Problem Relation Age of Onset   • Hypertension Father    • Coronary artery disease Father    • Diabetes Father    • Hyperlipidemia Father    • Coronary artery disease Brother      Social History     Tobacco Use   • Smoking status: Former Smoker   • " Smokeless tobacco: Never Used   Substance Use Topics   • Alcohol use: No   • Drug use: No     Allergies   Allergen Reactions   • Gemfibrozil Unknown (See Comments)     Patient doesn't remember    • Atorvastatin Rash         History of Present Illness  Encounter Diagnoses   Name Primary?   • Benign essential hypertension    • Hyperlipidemia, unspecified hyperlipidemia type    • Essential hypertension    • Vitamin D deficiency    • Hypogonadism in male    • Uncontrolled type 2 diabetes mellitus with hyperglycemia (CMS/[facility]) Yes   • Elevated lipids      78-year-old patient being seen today for an acute visit after seeing primary care provider and being notified of high blood sugar I the 400 range on routine labs. He was contacted to come in office and bring his medications and meter with him.  He brought his medications with him today and reports usually taking them as prescribed but occasionally forgetting doses here and there. He brought his  blood glucose meter to to today's visit and values were reviewed and discussed with patient.  Most of these values were taken fasting and are above goal.  He reports no symptoms of high blood sugars but does report rare episodes of hypoglycemia in which he feels shaky but drinks orange juice to correct.  Prior labs were reviewed with patient and updated labs will be obtained today.  His blood pressure is satisfactory at today's visit. He has been eating candy bars and pie.       The following portions of the patient's history were reviewed and updated as appropriate: allergies, current medications, past family history, past medical history, past social history, past surgical history and problem list.    Review of Systems   Constitutional: Negative for fatigue.   HENT: Negative for trouble swallowing.    Eyes: Negative for visual disturbance.   Respiratory: Negative for shortness of breath.    Cardiovascular: Negative for leg swelling.   Endocrine: Negative for polyuria.   Skin:  Negative for wound.   Neurological: Negative for numbness.       Objective   Physical Exam   Constitutional: He is oriented to person, place, and time. He appears well-developed and well-nourished. No distress.   HENT:   Head: Normocephalic and atraumatic.   Right Ear: External ear normal.   Left Ear: External ear normal.   Nose: Nose normal.   Eyes: Pupils are equal, round, and reactive to light. Right eye exhibits no discharge. Left eye exhibits no discharge.   Neck: Normal range of motion. Neck supple. Carotid bruit is not present. No tracheal deviation, no edema and no erythema present. No thyromegaly present.   Cardiovascular: Normal rate, regular rhythm and intact distal pulses. Exam reveals no gallop and no friction rub.   No murmur heard.  Pulmonary/Chest: Breath sounds normal. No respiratory distress. He has no wheezes. He has no rales.   Abdominal: Soft. Bowel sounds are normal. He exhibits no distension. There is no tenderness.   Musculoskeletal: Normal range of motion. He exhibits edema. He exhibits no deformity.   2+ edema bilateral lower extremities   Lymphadenopathy:     He has no cervical adenopathy.   Neurological: He is alert and oriented to person, place, and time. Coordination normal.   Skin: Skin is warm and dry. No rash noted. He is not diaphoretic. No erythema. No pallor.   Psychiatric: He has a normal mood and affect. His behavior is normal. Judgment and thought content normal.   Nursing note and vitals reviewed.    Lab Results   Component Value Date    HGBA1C 10.98 (H) 06/28/2018     Lab Results   Component Value Date    GLUCOSE 120 (H) 03/22/2018    BUN 17 06/28/2018    CREATININE 1.33 (H) 06/28/2018    EGFRIFNONA 52 (L) 06/28/2018    EGFRIFAFRI 63 06/28/2018    BCR 12.8 06/28/2018    K 4.9 06/28/2018    CO2 29.7 (H) 06/28/2018    CALCIUM 9.6 06/28/2018    PROTENTOTREF 6.7 06/28/2018    ALBUMIN 4.50 06/28/2018    LABIL2 2.0 06/28/2018    AST 14 06/28/2018    ALT 14 06/28/2018     Lab  Results   Component Value Date    CHOL 116 03/20/2018    CHLPL 125 06/28/2018    TRIG 132 06/28/2018    HDL 33 (L) 06/28/2018    LDL 66 06/28/2018     Lab Results   Component Value Date    TSH 1.250 03/20/2018    G3FZFFL 5.82 03/01/2017           Assessment/Plan   Problems Addressed this Visit        Cardiovascular and Mediastinum    Benign essential hypertension    Hyperlipidemia    Hypertension       Digestive    Vitamin D deficiency       Endocrine    Hypogonadism in male    Uncontrolled type 2 diabetes mellitus (CMS/HCC) - Primary       Other    Elevated lipids        In summary, this patient was seen and examined today for the above medical conditions.  Prior labs were reviewed and a copy was provided to the patient.  His blood pressure is in satisfactory range today.  We will change him to Toujeo max 140 units as currently taking and add 10 units of Humalog with each meal.  He is to stop taking glimepiride.  He is to check his blood sugar 2-3 times daily and report these to the office in about 1 week. I would like these values to be fasting before each meal.  We will get labs today and notify patient of results and with any changes in medications.  Patient was advised to treat low blood sugars if feeling low.  I've advised him to contact the office should he have any questions or concerns prior to that appointment. Will see back in January at regular scheduled visit.         Labs today, will notify of results  Check bs's in morning and before each meal.   Stop glimiperide  Continue all other meds and toujeo  Start humalog 10 units prior to each meal. Skip dose if you skip meal  Contact office the if you have symptoms of low blood sugars  Treat any blood sugar if you feel low

## 2018-11-12 NOTE — PATIENT INSTRUCTIONS
Labs today, will notify of results  Check bs's in morning and before each meal.   Stop glimiperide  Continue all other meds and toujeo  Start humalog 10 units prior to each meal. Skip dose if you skip meal  Contact office the if you have symptoms of low blood sugars  Treat any blood sugar if you feel low

## 2018-11-13 LAB
25(OH)D3+25(OH)D2 SERPL-MCNC: 57.7 NG/ML (ref 30–100)
ALBUMIN SERPL-MCNC: 4.2 G/DL (ref 3.5–5.2)
ALBUMIN/GLOB SERPL: 1.4 G/DL
ALP SERPL-CCNC: 88 U/L (ref 39–117)
ALT SERPL-CCNC: 12 U/L (ref 1–41)
AST SERPL-CCNC: 12 U/L (ref 1–40)
BILIRUB SERPL-MCNC: 0.4 MG/DL (ref 0.1–1.2)
BUN SERPL-MCNC: 17 MG/DL (ref 8–23)
BUN/CREAT SERPL: 17 (ref 7–25)
C PEPTIDE SERPL-MCNC: 2.4 NG/ML (ref 1.1–4.4)
CALCIUM SERPL-MCNC: 9.7 MG/DL (ref 8.6–10.5)
CHLORIDE SERPL-SCNC: 101 MMOL/L (ref 98–107)
CHOLEST SERPL-MCNC: 105 MG/DL (ref 0–200)
CO2 SERPL-SCNC: 27.8 MMOL/L (ref 22–29)
CREAT SERPL-MCNC: 1 MG/DL (ref 0.76–1.27)
FT4I SERPL CALC-MCNC: 1.9 (ref 1.2–4.9)
GLOBULIN SER CALC-MCNC: 2.9 GM/DL
GLUCOSE SERPL-MCNC: 113 MG/DL (ref 65–99)
HBA1C MFR BLD: 11.95 % (ref 4.8–5.6)
HDLC SERPL-MCNC: 28 MG/DL (ref 40–60)
INTERPRETATION: NORMAL
LDLC SERPL CALC-MCNC: 41 MG/DL (ref 0–100)
Lab: NORMAL
MICROALBUMIN UR-MCNC: 4.7 UG/ML
POTASSIUM SERPL-SCNC: 4.5 MMOL/L (ref 3.5–5.2)
PROT SERPL-MCNC: 7.1 G/DL (ref 6–8.5)
SODIUM SERPL-SCNC: 141 MMOL/L (ref 136–145)
T3FREE SERPL-MCNC: 3.1 PG/ML (ref 2–4.4)
T3RU NFR SERPL: 29 % (ref 24–39)
T4 FREE SERPL-MCNC: 1.06 NG/DL (ref 0.93–1.7)
T4 SERPL-MCNC: 6.4 UG/DL (ref 4.5–12)
TRIGL SERPL-MCNC: 179 MG/DL (ref 0–150)
TSH SERPL DL<=0.005 MIU/L-ACNC: 1.6 UIU/ML (ref 0.45–4.5)
VLDLC SERPL CALC-MCNC: 35.8 MG/DL (ref 5–40)

## 2018-11-14 ENCOUNTER — TELEPHONE (OUTPATIENT)
Dept: ENDOCRINOLOGY | Age: 78
End: 2018-11-14

## 2018-11-14 NOTE — TELEPHONE ENCOUNTER
----- Message from EMILY Borrego sent at 11/14/2018 12:05 PM EST -----  Med changes        Spoke to the patient and informed him that A1c is elevated, goal is less than 8. Danielle sent in Rx for metformin 2x daily to the pharmacy. Add this medication in addition to the changes made at the appointment. All other labs are satisfactory. The patient expressed understanding.

## 2018-11-19 RX ORDER — APIXABAN 5 MG/1
TABLET, FILM COATED ORAL
Qty: 180 TABLET | Refills: 1 | Status: SHIPPED | OUTPATIENT
Start: 2018-11-19 | End: 2019-01-14 | Stop reason: SDUPTHER

## 2018-11-19 RX ORDER — LINAGLIPTIN 5 MG/1
5 TABLET, FILM COATED ORAL DAILY
Qty: 90 TABLET | Refills: 1 | Status: SHIPPED | OUTPATIENT
Start: 2018-11-19 | End: 2019-05-28 | Stop reason: SDUPTHER

## 2018-11-19 RX ORDER — POTASSIUM CHLORIDE 750 MG/1
20 CAPSULE, EXTENDED RELEASE ORAL DAILY
Qty: 180 CAPSULE | Refills: 1 | Status: SHIPPED | OUTPATIENT
Start: 2018-11-19 | End: 2019-02-19

## 2018-11-29 ENCOUNTER — TELEPHONE (OUTPATIENT)
Dept: ENDOCRINOLOGY | Age: 78
End: 2018-11-29

## 2018-11-29 NOTE — TELEPHONE ENCOUNTER
----- Message from EMILY Borrego sent at 11/28/2018  1:26 PM EST -----  Increase mealtime insulin to 12 units prior to each meal. Monitor blood sugars closely. Let me know in a week what readings are.           Spoke to the patient and informed him he needs to increase mealtime insulin to 12 units prior to meals, continue to monitor blood sugars and call us back in about a week to let us know what the blood sugars are doing. The patient expressed understanding.

## 2018-12-03 DIAGNOSIS — N40.0 BENIGN NON-NODULAR PROSTATIC HYPERPLASIA WITHOUT LOWER URINARY TRACT SYMPTOMS: ICD-10-CM

## 2018-12-03 DIAGNOSIS — E78.5 HYPERLIPIDEMIA, UNSPECIFIED HYPERLIPIDEMIA TYPE: ICD-10-CM

## 2018-12-03 DIAGNOSIS — E66.9 OBESITY, UNSPECIFIED CLASSIFICATION, UNSPECIFIED OBESITY TYPE, UNSPECIFIED WHETHER SERIOUS COMORBIDITY PRESENT: ICD-10-CM

## 2018-12-03 DIAGNOSIS — E11.65 TYPE 2 DIABETES MELLITUS WITH HYPERGLYCEMIA (HCC): ICD-10-CM

## 2018-12-03 DIAGNOSIS — E55.9 VITAMIN D DEFICIENCY: ICD-10-CM

## 2018-12-03 DIAGNOSIS — E29.1 HYPOGONADISM IN MALE: ICD-10-CM

## 2018-12-03 DIAGNOSIS — R80.9 MICROALBUMINURIA: ICD-10-CM

## 2018-12-03 DIAGNOSIS — I10 BENIGN ESSENTIAL HYPERTENSION: ICD-10-CM

## 2018-12-12 ENCOUNTER — TELEPHONE (OUTPATIENT)
Dept: ENDOCRINOLOGY | Age: 78
End: 2018-12-12

## 2018-12-26 DIAGNOSIS — E55.9 VITAMIN D DEFICIENCY: ICD-10-CM

## 2018-12-26 DIAGNOSIS — N40.0 BENIGN PROSTATIC HYPERPLASIA WITHOUT LOWER URINARY TRACT SYMPTOMS: ICD-10-CM

## 2018-12-26 DIAGNOSIS — E11.65 UNCONTROLLED TYPE 2 DIABETES MELLITUS WITH HYPERGLYCEMIA (HCC): ICD-10-CM

## 2018-12-26 DIAGNOSIS — E29.1 HYPOGONADISM IN MALE: ICD-10-CM

## 2018-12-26 DIAGNOSIS — E78.5 HYPERLIPIDEMIA, UNSPECIFIED HYPERLIPIDEMIA TYPE: Primary | ICD-10-CM

## 2019-01-03 ENCOUNTER — LAB (OUTPATIENT)
Dept: ENDOCRINOLOGY | Age: 79
End: 2019-01-03

## 2019-01-03 DIAGNOSIS — E29.1 HYPOGONADISM IN MALE: ICD-10-CM

## 2019-01-03 DIAGNOSIS — E11.65 UNCONTROLLED TYPE 2 DIABETES MELLITUS WITH HYPERGLYCEMIA (HCC): ICD-10-CM

## 2019-01-03 DIAGNOSIS — E55.9 VITAMIN D DEFICIENCY: ICD-10-CM

## 2019-01-03 DIAGNOSIS — N40.0 BENIGN PROSTATIC HYPERPLASIA WITHOUT LOWER URINARY TRACT SYMPTOMS: ICD-10-CM

## 2019-01-03 DIAGNOSIS — E78.5 HYPERLIPIDEMIA, UNSPECIFIED HYPERLIPIDEMIA TYPE: ICD-10-CM

## 2019-01-04 ENCOUNTER — APPOINTMENT (OUTPATIENT)
Dept: CARDIOLOGY | Facility: HOSPITAL | Age: 79
End: 2019-01-04

## 2019-01-04 LAB
25(OH)D3+25(OH)D2 SERPL-MCNC: 54.8 NG/ML (ref 30–100)
ALBUMIN SERPL-MCNC: 3.9 G/DL (ref 3.5–5.2)
ALBUMIN/GLOB SERPL: 1.7 G/DL
ALP SERPL-CCNC: 64 U/L (ref 39–117)
ALT SERPL-CCNC: 18 U/L (ref 1–41)
AST SERPL-CCNC: 18 U/L (ref 1–40)
BILIRUB SERPL-MCNC: 0.3 MG/DL (ref 0.1–1.2)
BUN SERPL-MCNC: 15 MG/DL (ref 8–23)
BUN/CREAT SERPL: 13.6 (ref 7–25)
C PEPTIDE SERPL-MCNC: 2.1 NG/ML (ref 1.1–4.4)
CALCIUM SERPL-MCNC: 9.4 MG/DL (ref 8.6–10.5)
CHLORIDE SERPL-SCNC: 103 MMOL/L (ref 98–107)
CHOLEST SERPL-MCNC: 109 MG/DL (ref 0–200)
CO2 SERPL-SCNC: 26.5 MMOL/L (ref 22–29)
CREAT SERPL-MCNC: 1.1 MG/DL (ref 0.76–1.27)
GLOBULIN SER CALC-MCNC: 2.3 GM/DL
GLUCOSE SERPL-MCNC: 128 MG/DL (ref 65–99)
HBA1C MFR BLD: 10.49 % (ref 4.8–5.6)
HCT VFR BLD AUTO: 42.8 % (ref 40.4–52.2)
HDLC SERPL-MCNC: 25 MG/DL (ref 40–60)
HGB BLD-MCNC: 14.2 G/DL (ref 13.7–17.6)
INTERPRETATION: NORMAL
LDLC SERPL CALC-MCNC: 45 MG/DL (ref 0–100)
Lab: NORMAL
POTASSIUM SERPL-SCNC: 4.9 MMOL/L (ref 3.5–5.2)
PROT SERPL-MCNC: 6.2 G/DL (ref 6–8.5)
PSA SERPL-MCNC: 1.19 NG/ML (ref 0–4)
SHBG SERPL-SCNC: 27.3 NMOL/L (ref 19.3–76.4)
SODIUM SERPL-SCNC: 143 MMOL/L (ref 136–145)
TESTOST FREE SERPL-MCNC: 3.2 PG/ML (ref 6.6–18.1)
TESTOST SERPL-MCNC: 103 NG/DL (ref 264–916)
TRIGL SERPL-MCNC: 196 MG/DL (ref 0–150)
VLDLC SERPL CALC-MCNC: 39.2 MG/DL (ref 5–40)

## 2019-01-15 ENCOUNTER — OFFICE VISIT (OUTPATIENT)
Dept: ENDOCRINOLOGY | Age: 79
End: 2019-01-15

## 2019-01-15 VITALS
HEIGHT: 72 IN | WEIGHT: 255 LBS | SYSTOLIC BLOOD PRESSURE: 142 MMHG | DIASTOLIC BLOOD PRESSURE: 72 MMHG | BODY MASS INDEX: 34.54 KG/M2

## 2019-01-15 DIAGNOSIS — E11.65 UNCONTROLLED TYPE 2 DIABETES MELLITUS WITH HYPERGLYCEMIA (HCC): ICD-10-CM

## 2019-01-15 DIAGNOSIS — E55.9 VITAMIN D DEFICIENCY: ICD-10-CM

## 2019-01-15 DIAGNOSIS — I10 BENIGN ESSENTIAL HYPERTENSION: Primary | ICD-10-CM

## 2019-01-15 DIAGNOSIS — E29.1 HYPOGONADISM IN MALE: ICD-10-CM

## 2019-01-15 DIAGNOSIS — E78.5 HYPERLIPIDEMIA, UNSPECIFIED HYPERLIPIDEMIA TYPE: ICD-10-CM

## 2019-01-15 PROCEDURE — 99214 OFFICE O/P EST MOD 30 MIN: CPT | Performed by: NURSE PRACTITIONER

## 2019-01-15 RX ORDER — FUROSEMIDE 40 MG/1
40 TABLET ORAL AS NEEDED
COMMUNITY
End: 2019-02-19

## 2019-01-23 RX ORDER — EMPAGLIFLOZIN 25 MG/1
TABLET, FILM COATED ORAL
Qty: 90 TABLET | Refills: 1 | Status: SHIPPED | OUTPATIENT
Start: 2019-01-23 | End: 2019-05-28 | Stop reason: SDUPTHER

## 2019-01-25 ENCOUNTER — APPOINTMENT (OUTPATIENT)
Dept: CARDIOLOGY | Facility: HOSPITAL | Age: 79
End: 2019-01-25

## 2019-01-25 DIAGNOSIS — I10 HYPERTENSION, ESSENTIAL: Primary | ICD-10-CM

## 2019-01-25 DIAGNOSIS — I48.91 ATRIAL FIBRILLATION, UNSPECIFIED TYPE (HCC): ICD-10-CM

## 2019-01-25 DIAGNOSIS — R94.31 NONSPECIFIC ABNORMAL ELECTROCARDIOGRAM (ECG) (EKG): ICD-10-CM

## 2019-02-04 ENCOUNTER — ON CAMPUS - OUTPATIENT (AMBULATORY)
Dept: URBAN - METROPOLITAN AREA HOSPITAL 108 | Facility: HOSPITAL | Age: 79
End: 2019-02-04
Payer: MEDICARE

## 2019-02-04 DIAGNOSIS — Z86.010 PERSONAL HISTORY OF COLONIC POLYPS: ICD-10-CM

## 2019-02-04 DIAGNOSIS — K63.5 POLYP OF COLON: ICD-10-CM

## 2019-02-04 DIAGNOSIS — K29.70 GASTRITIS, UNSPECIFIED, WITHOUT BLEEDING: ICD-10-CM

## 2019-02-04 DIAGNOSIS — K21.9 GASTRO-ESOPHAGEAL REFLUX DISEASE WITHOUT ESOPHAGITIS: ICD-10-CM

## 2019-02-04 DIAGNOSIS — R13.10 DYSPHAGIA, UNSPECIFIED: ICD-10-CM

## 2019-02-04 PROCEDURE — 45385 COLONOSCOPY W/LESION REMOVAL: CPT | Mod: PT | Performed by: INTERNAL MEDICINE

## 2019-02-04 PROCEDURE — 43239 EGD BIOPSY SINGLE/MULTIPLE: CPT | Mod: 59 | Performed by: INTERNAL MEDICINE

## 2019-02-13 ENCOUNTER — HOSPITAL ENCOUNTER (OUTPATIENT)
Dept: CARDIOLOGY | Facility: HOSPITAL | Age: 79
Discharge: HOME | End: 2019-02-13
Admitting: INTERNAL MEDICINE

## 2019-02-13 ENCOUNTER — HOSPITAL ENCOUNTER (OUTPATIENT)
Dept: CARDIOLOGY | Facility: HOSPITAL | Age: 79
Discharge: HOME | End: 2019-02-13
Attending: INTERNAL MEDICINE

## 2019-02-13 VITALS
DIASTOLIC BLOOD PRESSURE: 77 MMHG | HEART RATE: 80 BPM | BODY MASS INDEX: 34.54 KG/M2 | WEIGHT: 255 LBS | SYSTOLIC BLOOD PRESSURE: 147 MMHG | HEIGHT: 72 IN

## 2019-02-13 DIAGNOSIS — E78.5 HYPERLIPIDEMIA, UNSPECIFIED HYPERLIPIDEMIA TYPE: ICD-10-CM

## 2019-02-13 LAB
ALBUMIN SERPL-MCNC: 4 G/DL (ref 3.5–5.2)
ALBUMIN/GLOB SERPL: 1.4 G/DL
ALP SERPL-CCNC: 54 U/L (ref 39–117)
ALT SERPL W P-5'-P-CCNC: 14 U/L (ref 1–41)
ANION GAP SERPL CALCULATED.3IONS-SCNC: 13.1 MMOL/L
AST SERPL-CCNC: 12 U/L (ref 1–40)
BH CV ECHO MEAS - ACS: 2 CM
BH CV ECHO MEAS - AO MAX PG (FULL): 4.2 MMHG
BH CV ECHO MEAS - AO MAX PG: 6 MMHG
BH CV ECHO MEAS - AO MEAN PG (FULL): 2 MMHG
BH CV ECHO MEAS - AO MEAN PG: 3 MMHG
BH CV ECHO MEAS - AO ROOT AREA (BSA CORRECTED): 1.5
BH CV ECHO MEAS - AO ROOT AREA: 9.6 CM^2
BH CV ECHO MEAS - AO ROOT DIAM: 3.5 CM
BH CV ECHO MEAS - AO V2 MAX: 122 CM/SEC
BH CV ECHO MEAS - AO V2 MEAN: 86.6 CM/SEC
BH CV ECHO MEAS - AO V2 VTI: 24.3 CM
BH CV ECHO MEAS - AVA(I,A): 1.9 CM^2
BH CV ECHO MEAS - AVA(I,D): 1.9 CM^2
BH CV ECHO MEAS - AVA(V,A): 1.7 CM^2
BH CV ECHO MEAS - AVA(V,D): 1.7 CM^2
BH CV ECHO MEAS - BSA(HAYCOCK): 2.5 M^2
BH CV ECHO MEAS - BSA: 2.4 M^2
BH CV ECHO MEAS - BZI_BMI: 34.6 KILOGRAMS/M^2
BH CV ECHO MEAS - BZI_METRIC_HEIGHT: 182.9 CM
BH CV ECHO MEAS - BZI_METRIC_WEIGHT: 115.7 KG
BH CV ECHO MEAS - EDV(CUBED): 125 ML
BH CV ECHO MEAS - EDV(MOD-SP2): 92 ML
BH CV ECHO MEAS - EDV(MOD-SP4): 105 ML
BH CV ECHO MEAS - EDV(TEICH): 118.2 ML
BH CV ECHO MEAS - EF(CUBED): 80.5 %
BH CV ECHO MEAS - EF(MOD-BP): 55 %
BH CV ECHO MEAS - EF(MOD-SP2): 59.8 %
BH CV ECHO MEAS - EF(MOD-SP4): 55.2 %
BH CV ECHO MEAS - EF(TEICH): 72.8 %
BH CV ECHO MEAS - ESV(CUBED): 24.4 ML
BH CV ECHO MEAS - ESV(MOD-SP2): 37 ML
BH CV ECHO MEAS - ESV(MOD-SP4): 47 ML
BH CV ECHO MEAS - ESV(TEICH): 32.2 ML
BH CV ECHO MEAS - FS: 42 %
BH CV ECHO MEAS - IVS/LVPW: 0.87
BH CV ECHO MEAS - IVSD: 1.3 CM
BH CV ECHO MEAS - LA DIMENSION: 5.4 CM
BH CV ECHO MEAS - LA/AO: 1.5
BH CV ECHO MEAS - LAT PEAK E' VEL: 8.5 CM/SEC
BH CV ECHO MEAS - LV DIASTOLIC VOL/BSA (35-75): 44.5 ML/M^2
BH CV ECHO MEAS - LV MASS(C)D: 291.4 GRAMS
BH CV ECHO MEAS - LV MASS(C)DI: 123.4 GRAMS/M^2
BH CV ECHO MEAS - LV MAX PG: 1.8 MMHG
BH CV ECHO MEAS - LV MEAN PG: 1 MMHG
BH CV ECHO MEAS - LV SYSTOLIC VOL/BSA (12-30): 19.9 ML/M^2
BH CV ECHO MEAS - LV V1 MAX: 66.5 CM/SEC
BH CV ECHO MEAS - LV V1 MEAN: 46.5 CM/SEC
BH CV ECHO MEAS - LV V1 VTI: 14.9 CM
BH CV ECHO MEAS - LVIDD: 5 CM
BH CV ECHO MEAS - LVIDS: 2.9 CM
BH CV ECHO MEAS - LVLD AP2: 7.6 CM
BH CV ECHO MEAS - LVLD AP4: 7.4 CM
BH CV ECHO MEAS - LVLS AP2: 6 CM
BH CV ECHO MEAS - LVLS AP4: 6.9 CM
BH CV ECHO MEAS - LVOT AREA (M): 3.1 CM^2
BH CV ECHO MEAS - LVOT AREA: 3.1 CM^2
BH CV ECHO MEAS - LVOT DIAM: 2 CM
BH CV ECHO MEAS - LVPWD: 1.5 CM
BH CV ECHO MEAS - MED PEAK E' VEL: 6.4 CM/SEC
BH CV ECHO MEAS - MV A DUR: 0.15 SEC
BH CV ECHO MEAS - MV A MAX VEL: 85.4 CM/SEC
BH CV ECHO MEAS - MV DEC SLOPE: 200 CM/SEC^2
BH CV ECHO MEAS - MV DEC TIME: 0.2 SEC
BH CV ECHO MEAS - MV E MAX VEL: 56.8 CM/SEC
BH CV ECHO MEAS - MV E/A: 0.67
BH CV ECHO MEAS - MV MAX PG: 3.8 MMHG
BH CV ECHO MEAS - MV MEAN PG: 2 MMHG
BH CV ECHO MEAS - MV P1/2T MAX VEL: 70.6 CM/SEC
BH CV ECHO MEAS - MV P1/2T: 103.4 MSEC
BH CV ECHO MEAS - MV V2 MAX: 97.6 CM/SEC
BH CV ECHO MEAS - MV V2 MEAN: 58.7 CM/SEC
BH CV ECHO MEAS - MV V2 VTI: 23 CM
BH CV ECHO MEAS - MVA P1/2T LCG: 3.1 CM^2
BH CV ECHO MEAS - MVA(P1/2T): 2.1 CM^2
BH CV ECHO MEAS - MVA(VTI): 2 CM^2
BH CV ECHO MEAS - PA ACC TIME: 0.08 SEC
BH CV ECHO MEAS - PA MAX PG (FULL): 2.4 MMHG
BH CV ECHO MEAS - PA MAX PG: 4.9 MMHG
BH CV ECHO MEAS - PA PR(ACCEL): 43.5 MMHG
BH CV ECHO MEAS - PA V2 MAX: 111 CM/SEC
BH CV ECHO MEAS - PI END-D VEL: 164 CM/SEC
BH CV ECHO MEAS - PULM A REVS DUR: 0.15 SEC
BH CV ECHO MEAS - PULM A REVS VEL: 25.7 CM/SEC
BH CV ECHO MEAS - PULM DIAS VEL: 33 CM/SEC
BH CV ECHO MEAS - PULM S/D: 1.6
BH CV ECHO MEAS - PULM SYS VEL: 52.7 CM/SEC
BH CV ECHO MEAS - PVA(V,A): 4.4 CM^2
BH CV ECHO MEAS - PVA(V,D): 4.4 CM^2
BH CV ECHO MEAS - QP/QS: 1.8
BH CV ECHO MEAS - RAP SYSTOLE: 3 MMHG
BH CV ECHO MEAS - RV MAX PG: 2.5 MMHG
BH CV ECHO MEAS - RV MEAN PG: 1 MMHG
BH CV ECHO MEAS - RV V1 MAX: 79.1 CM/SEC
BH CV ECHO MEAS - RV V1 MEAN: 51.1 CM/SEC
BH CV ECHO MEAS - RV V1 VTI: 13.7 CM
BH CV ECHO MEAS - RVDD: 3.1 CM
BH CV ECHO MEAS - RVOT AREA: 6.2 CM^2
BH CV ECHO MEAS - RVOT DIAM: 2.8 CM
BH CV ECHO MEAS - RVSP: 22.5 MMHG
BH CV ECHO MEAS - SI(AO): 99 ML/M^2
BH CV ECHO MEAS - SI(CUBED): 42.6 ML/M^2
BH CV ECHO MEAS - SI(LVOT): 19.8 ML/M^2
BH CV ECHO MEAS - SI(MOD-SP2): 23.3 ML/M^2
BH CV ECHO MEAS - SI(MOD-SP4): 24.6 ML/M^2
BH CV ECHO MEAS - SI(TEICH): 36.4 ML/M^2
BH CV ECHO MEAS - SV(AO): 233.8 ML
BH CV ECHO MEAS - SV(CUBED): 100.6 ML
BH CV ECHO MEAS - SV(LVOT): 46.8 ML
BH CV ECHO MEAS - SV(MOD-SP2): 55 ML
BH CV ECHO MEAS - SV(MOD-SP4): 58 ML
BH CV ECHO MEAS - SV(RVOT): 84.4 ML
BH CV ECHO MEAS - SV(TEICH): 86 ML
BH CV ECHO MEAS - TAPSE (>1.6): 2 CM2
BH CV ECHO MEAS - TR MAX VEL: 221 CM/SEC
BH CV ECHO MEASUREMENTS AVERAGE E/E' RATIO: 7.62
BH CV XLRA - RV BASE: 3.1 CM
BH CV XLRA - RV LENGTH: 7 CM
BH CV XLRA - RV MID: 2.7 CM
BH CV XLRA - TDI S': 12.8 CM/SEC
BILIRUB SERPL-MCNC: 0.4 MG/DL (ref 0.1–1.2)
BUN BLD-MCNC: 14 MG/DL (ref 8–23)
BUN/CREAT SERPL: 13.7 (ref 7–25)
CALCIUM SPEC-SCNC: 9.5 MG/DL (ref 8.6–10.5)
CHLORIDE SERPL-SCNC: 104 MMOL/L (ref 98–107)
CHOLEST SERPL-MCNC: 103 MG/DL (ref 0–200)
CO2 SERPL-SCNC: 25.9 MMOL/L (ref 22–29)
CREAT BLD-MCNC: 1.02 MG/DL (ref 0.76–1.27)
GFR SERPL CREATININE-BSD FRML MDRD: 70 ML/MIN/1.73
GLOBULIN UR ELPH-MCNC: 2.9 GM/DL
GLUCOSE BLD-MCNC: 149 MG/DL (ref 65–99)
HDLC SERPL-MCNC: 25 MG/DL (ref 40–60)
LDLC SERPL CALC-MCNC: 46 MG/DL (ref 0–100)
LDLC/HDLC SERPL: 1.83 {RATIO}
LEFT ATRIUM VOLUME INDEX: 27 ML/M2
MAXIMAL PREDICTED HEART RATE: 140 BPM
POTASSIUM BLD-SCNC: 4.5 MMOL/L (ref 3.5–5.2)
PROT SERPL-MCNC: 6.9 G/DL (ref 6–8.5)
SODIUM BLD-SCNC: 143 MMOL/L (ref 136–145)
STRESS TARGET HR: 119 BPM
TRIGL SERPL-MCNC: 161 MG/DL (ref 0–150)
VLDLC SERPL-MCNC: 32.2 MG/DL (ref 5–40)

## 2019-02-13 PROCEDURE — 80053 COMPREHEN METABOLIC PANEL: CPT | Performed by: INTERNAL MEDICINE

## 2019-02-13 PROCEDURE — 36415 COLL VENOUS BLD VENIPUNCTURE: CPT | Performed by: INTERNAL MEDICINE

## 2019-02-13 PROCEDURE — 93306 TTE W/DOPPLER COMPLETE: CPT

## 2019-02-13 PROCEDURE — 93306 TTE W/DOPPLER COMPLETE: CPT | Performed by: INTERNAL MEDICINE

## 2019-02-13 PROCEDURE — 80061 LIPID PANEL: CPT | Performed by: INTERNAL MEDICINE

## 2019-02-19 ENCOUNTER — OFFICE VISIT (OUTPATIENT)
Dept: CARDIOLOGY | Facility: CLINIC | Age: 79
End: 2019-02-19

## 2019-02-19 VITALS
DIASTOLIC BLOOD PRESSURE: 70 MMHG | SYSTOLIC BLOOD PRESSURE: 158 MMHG | BODY MASS INDEX: 32.47 KG/M2 | HEART RATE: 103 BPM | WEIGHT: 253 LBS | HEIGHT: 74 IN

## 2019-02-19 DIAGNOSIS — I48.0 PAROXYSMAL ATRIAL FIBRILLATION (HCC): ICD-10-CM

## 2019-02-19 DIAGNOSIS — Z79.01 ANTICOAGULATION ADEQUATE: ICD-10-CM

## 2019-02-19 DIAGNOSIS — I10 ESSENTIAL HYPERTENSION: Primary | ICD-10-CM

## 2019-02-19 DIAGNOSIS — E78.5 HYPERLIPIDEMIA, UNSPECIFIED HYPERLIPIDEMIA TYPE: ICD-10-CM

## 2019-02-19 PROCEDURE — 99213 OFFICE O/P EST LOW 20 MIN: CPT | Performed by: INTERNAL MEDICINE

## 2019-02-19 NOTE — PROGRESS NOTES
Subjective:        Noel Garcia is a 78 y.o. male who here for follow up    CC  The follow-up for the hyperlipidemia hypertension as well as anticoagulation  HPI  78 years old male with known history of benign essential arterial hypertension, hyperlipidemia paroxysmal atrial fibrillation and anticoagulation here for the follow-up denies any chest pains or tightness in the chest     Problem List Items Addressed This Visit        Cardiovascular and Mediastinum    Hyperlipidemia    Hypertension - Primary    Paroxysmal atrial fibrillation (CMS/HCC)       Hematopoietic and Hemostatic    Anticoagulation adequate        .    The following portions of the patient's history were reviewed and updated as appropriate: allergies, current medications, past family history, past medical history, past social history, past surgical history and problem list.    Past Medical History:   Diagnosis Date   • Benign essential hypertension    • Benign prostatic hyperplasia 1/21/2016   • Diabetes mellitus (CMS/HCC)    • Gastroesophageal reflux disease 1/21/2016   • GERD (gastroesophageal reflux disease)    • Hamartoma of lung (CMS/HCC)    • Hyperlipidemia    • Microalbuminuria    • Obesity    • Prostatic hyperplasia    • Type 2 diabetes mellitus (CMS/HCC)    • Vitamin D deficiency      reports that he has quit smoking. His smoking use included cigarettes. he has never used smokeless tobacco. He reports that he does not drink alcohol or use drugs.   Family History   Problem Relation Age of Onset   • Hypertension Father    • Coronary artery disease Father    • Diabetes Father    • Hyperlipidemia Father    • Coronary artery disease Brother        Review of Systems  Constitutional: No wt loss, fever, fatigue  Gastrointestinal: No nausea, abdominal pain  Behavioral/Psych: No insomnia or anxiety   Cardiovascular no chest pains or tightness in the chest  Objective:      /70 (BP Location: Left arm, Patient Position: Sitting)   Pulse 103    "Ht 188 cm (74\")   Wt 115 kg (253 lb)   BMI 32.48 kg/m²   General appearance: No acute changes   Neck: Trachea midline; NECK, supple, no thyromegaly or lymphadenopathy   Lungs: Normal size and shape, normal breath sounds, equal distribution of air, no rales and rhonchi   CV: S1-S2 regular, no murmurs, no rub, no gallop   Abdomen: Soft, non-tender; no masses , no abnormal abdominal sounds   Extremities: No deformity , normal color , no peripheral edema   Skin: Normal temperature, turgor and texture; no rash, ulcers          Procedures      Echocardiogram:        Current Outpatient Medications:   •  ACCU-CHEK ROSETTA PLUS test strip, USE 3 TIMES DAILY AS  DIRECTED, Disp: 300 each, Rfl: 1  •  ACCU-CHEK FASTCLIX LANCETS misc, Check 3 times daily, Disp: 300 each, Rfl: 1  •  apixaban (ELIQUIS) 5 MG tablet tablet, Take 1 tablet by mouth Every 12 (Twelve) Hours., Disp: 60 tablet, Rfl: 1  •  aspirin 325 MG tablet, Take 1 tablet by mouth daily., Disp: , Rfl:   •  ergocalciferol (ERGOCALCIFEROL) 31625 units capsule, Take 1 capsule twice weekly, Disp: 24 capsule, Rfl: 1  •  ezetimibe-simvastatin (VYTORIN) 10-40 MG per tablet, Take 1 po daily, Disp: 90 tablet, Rfl: 1  •  Insulin Glargine (TOUJEO MAX SOLOSTAR) 300 UNIT/ML solution pen-injector, Inject 140 Units under the skin into the appropriate area as directed Daily., Disp: 54 mL, Rfl: 1  •  Insulin Lispro (HUMALOG) 100 UNIT/ML solution pen-injector, 11 units prior to each meal. Skip dose if you skip meal (Patient taking differently: Inject 12 Units under the skin into the appropriate area as directed 3 (Three) Times a Day. 11 units prior to each meal. Skip dose if you skip meal), Disp: 45 mL, Rfl: 1  •  Insulin Pen Needle 31G X 5 MM misc, Use to inject insulin 4x daily, Disp: 400 each, Rfl: 1  •  JARDIANCE 25 MG tablet, TAKE 1 TABLET BY MOUTH  DAILY, Disp: 90 tablet, Rfl: 1  •  metFORMIN (GLUCOPHAGE) 500 MG tablet, Take 2 tablets by mouth 2 (Two) Times a Day With Meals., Disp: " 180 tablet, Rfl: 1  •  omeprazole (priLOSEC) 20 MG capsule, TAKE 1 CAPSULE BY MOUTH  DAILY, Disp: 90 capsule, Rfl: 0  •  TRADJENTA 5 MG tablet tablet, TAKE 1 TABLET BY MOUTH  DAILY, Disp: 90 tablet, Rfl: 1   Assessment:        Patient Active Problem List   Diagnosis   • Vitamin D deficiency   • Benign essential hypertension   • Diabetes mellitus (CMS/HCC)   • Gastroesophageal reflux disease   • Hyperlipidemia   • Hypogonadism in male   • Microalbuminuria   • Adiposity   • Benign prostatic hyperplasia   • Uncontrolled type 2 diabetes mellitus (CMS/HCC)   • Cerebrovascular accident (CMS/HCC)   • Elevated lipids   • Hamartoma (CMS/HCC)   • Hypertension   • Malignant melanoma (CMS/HCC)   • New onset atrial fibrillation (CMS/HCC)     Interpretation Summary     · Left atrial cavity size is mildly dilated.  · Calculated EF = 55%.  · There is no evidence of pericardial effusion              Total Cholesterol 0 - 200 mg/dL 103    Triglycerides 0 - 150 mg/dL 161 Abnormally high     HDL Cholesterol 40 - 60 mg/dL 25 Abnormally low     LDL Cholesterol  0 - 100 mg/dL 46    VLDL Cholesterol 5 - 40 mg/dL 32.2    LDL/HDL Ratio  1.83            Plan:            ICD-10-CM ICD-9-CM   1. Essential hypertension I10 401.9   2. Hyperlipidemia, unspecified hyperlipidemia type E78.5 272.4   3. Anticoagulation adequate Z79.01 V58.61   4. Paroxysmal atrial fibrillation (CMS/HCC) I48.0 427.31     1. Essential hypertension  Blood pressure controlled, high here but the blood pressure is running normal at home    2. Hyperlipidemia, unspecified hyperlipidemia type  Counseling has been done    3. Anticoagulation adequate  Pros and cons as well as indication of the anticoagulation has been explained to the patient in detail    There are no obvious complications at this stage    Risk of  the bleedings has been explained    Need for the regular blood workup and adjust the dose has been explained    Need for proper follow-up on anticoagulation also has  been explained      4. Paroxysmal atrial fibrillation (CMS/HCC)  Remains normal sinus rhythm       BP BETTER AT HOME      SEE IN 1 YR  COUNSELING:    Noel Scruggs was given to patient for the following topics: diagnostic results, risk factor reductions, impressions, risks and benefits of treatment options and importance of treatment compliance .       SMOKING COUNSELING:    Counseling given: Yes      Dictated using Dragon dictation

## 2019-03-12 RX ORDER — INSULIN GLARGINE 300 U/ML
INJECTION, SOLUTION SUBCUTANEOUS
Qty: 42 ML | Refills: 0 | Status: SHIPPED | OUTPATIENT
Start: 2019-03-12 | End: 2019-04-15 | Stop reason: SDUPTHER

## 2019-03-26 DIAGNOSIS — E55.9 VITAMIN D DEFICIENCY: ICD-10-CM

## 2019-03-26 DIAGNOSIS — E29.1 HYPOGONADISM IN MALE: ICD-10-CM

## 2019-03-26 DIAGNOSIS — N40.0 BENIGN NON-NODULAR PROSTATIC HYPERPLASIA WITHOUT LOWER URINARY TRACT SYMPTOMS: ICD-10-CM

## 2019-03-26 DIAGNOSIS — I10 BENIGN ESSENTIAL HYPERTENSION: ICD-10-CM

## 2019-03-26 DIAGNOSIS — E78.5 HYPERLIPIDEMIA, UNSPECIFIED HYPERLIPIDEMIA TYPE: ICD-10-CM

## 2019-03-26 DIAGNOSIS — R80.9 MICROALBUMINURIA: ICD-10-CM

## 2019-03-26 DIAGNOSIS — E11.65 TYPE 2 DIABETES MELLITUS WITH HYPERGLYCEMIA (HCC): ICD-10-CM

## 2019-03-26 DIAGNOSIS — E66.9 OBESITY, UNSPECIFIED CLASSIFICATION, UNSPECIFIED OBESITY TYPE, UNSPECIFIED WHETHER SERIOUS COMORBIDITY PRESENT: ICD-10-CM

## 2019-03-26 RX ORDER — EZETIMIBE AND SIMVASTATIN 10; 40 MG/1; MG/1
TABLET ORAL
Qty: 90 TABLET | Refills: 0 | Status: SHIPPED | OUTPATIENT
Start: 2019-03-26 | End: 2019-06-06 | Stop reason: SDUPTHER

## 2019-03-26 RX ORDER — EZETIMIBE AND SIMVASTATIN 10; 40 MG/1; MG/1
TABLET ORAL
Qty: 90 TABLET | Refills: 1 | Status: CANCELLED | OUTPATIENT
Start: 2019-03-26

## 2019-04-09 DIAGNOSIS — E78.5 HYPERLIPIDEMIA, UNSPECIFIED HYPERLIPIDEMIA TYPE: ICD-10-CM

## 2019-04-09 DIAGNOSIS — N40.0 BENIGN NON-NODULAR PROSTATIC HYPERPLASIA WITHOUT LOWER URINARY TRACT SYMPTOMS: ICD-10-CM

## 2019-04-09 DIAGNOSIS — I10 BENIGN ESSENTIAL HYPERTENSION: ICD-10-CM

## 2019-04-09 DIAGNOSIS — E55.9 VITAMIN D DEFICIENCY: ICD-10-CM

## 2019-04-09 DIAGNOSIS — R80.9 MICROALBUMINURIA: ICD-10-CM

## 2019-04-09 DIAGNOSIS — E66.9 OBESITY, UNSPECIFIED CLASSIFICATION, UNSPECIFIED OBESITY TYPE, UNSPECIFIED WHETHER SERIOUS COMORBIDITY PRESENT: ICD-10-CM

## 2019-04-09 DIAGNOSIS — E29.1 HYPOGONADISM IN MALE: ICD-10-CM

## 2019-04-09 DIAGNOSIS — E11.65 TYPE 2 DIABETES MELLITUS WITH HYPERGLYCEMIA (HCC): ICD-10-CM

## 2019-05-20 DIAGNOSIS — E11.65 TYPE 2 DIABETES MELLITUS WITH HYPERGLYCEMIA, UNSPECIFIED WHETHER LONG TERM INSULIN USE (HCC): ICD-10-CM

## 2019-05-20 DIAGNOSIS — E29.1 HYPOGONADISM IN MALE: ICD-10-CM

## 2019-05-20 DIAGNOSIS — E78.5 HYPERLIPIDEMIA, UNSPECIFIED HYPERLIPIDEMIA TYPE: ICD-10-CM

## 2019-05-20 DIAGNOSIS — N40.0 BENIGN NON-NODULAR PROSTATIC HYPERPLASIA WITHOUT LOWER URINARY TRACT SYMPTOMS: ICD-10-CM

## 2019-05-20 DIAGNOSIS — E55.9 VITAMIN D DEFICIENCY: Primary | ICD-10-CM

## 2019-05-23 ENCOUNTER — LAB (OUTPATIENT)
Dept: ENDOCRINOLOGY | Age: 79
End: 2019-05-23

## 2019-05-23 DIAGNOSIS — E11.65 TYPE 2 DIABETES MELLITUS WITH HYPERGLYCEMIA, UNSPECIFIED WHETHER LONG TERM INSULIN USE (HCC): ICD-10-CM

## 2019-05-23 DIAGNOSIS — E78.5 HYPERLIPIDEMIA, UNSPECIFIED HYPERLIPIDEMIA TYPE: ICD-10-CM

## 2019-05-23 DIAGNOSIS — N40.0 BENIGN NON-NODULAR PROSTATIC HYPERPLASIA WITHOUT LOWER URINARY TRACT SYMPTOMS: ICD-10-CM

## 2019-05-23 DIAGNOSIS — E55.9 VITAMIN D DEFICIENCY: ICD-10-CM

## 2019-05-23 DIAGNOSIS — E29.1 HYPOGONADISM IN MALE: ICD-10-CM

## 2019-05-25 LAB
25(OH)D3+25(OH)D2 SERPL-MCNC: 41.6 NG/ML (ref 30–100)
ALBUMIN SERPL-MCNC: 4 G/DL (ref 3.5–5.2)
ALBUMIN/GLOB SERPL: 1.6 G/DL
ALP SERPL-CCNC: 70 U/L (ref 39–117)
ALT SERPL-CCNC: 17 U/L (ref 1–41)
AST SERPL-CCNC: 12 U/L (ref 1–40)
BILIRUB SERPL-MCNC: 0.3 MG/DL (ref 0.2–1.2)
BUN SERPL-MCNC: 16 MG/DL (ref 8–23)
BUN/CREAT SERPL: 16 (ref 7–25)
C PEPTIDE SERPL-MCNC: 1.9 NG/ML (ref 1.1–4.4)
CALCIUM SERPL-MCNC: 9.7 MG/DL (ref 8.6–10.5)
CHLORIDE SERPL-SCNC: 102 MMOL/L (ref 98–107)
CHOLEST SERPL-MCNC: 100 MG/DL (ref 0–200)
CO2 SERPL-SCNC: 25.8 MMOL/L (ref 22–29)
CREAT SERPL-MCNC: 1 MG/DL (ref 0.76–1.27)
GLOBULIN SER CALC-MCNC: 2.5 GM/DL
GLUCOSE SERPL-MCNC: 153 MG/DL (ref 65–99)
HBA1C MFR BLD: 9 % (ref 4.8–5.6)
HCT VFR BLD AUTO: 47.7 % (ref 37.5–51)
HDLC SERPL-MCNC: 23 MG/DL (ref 40–60)
HGB BLD-MCNC: 14.2 G/DL (ref 13–17.7)
INTERPRETATION: NORMAL
LDLC SERPL CALC-MCNC: 33 MG/DL (ref 0–100)
Lab: NORMAL
POTASSIUM SERPL-SCNC: 4.8 MMOL/L (ref 3.5–5.2)
PROT SERPL-MCNC: 6.5 G/DL (ref 6–8.5)
PSA SERPL-MCNC: 0.55 NG/ML (ref 0–4)
SHBG SERPL-SCNC: 22.3 NMOL/L (ref 19.3–76.4)
SODIUM SERPL-SCNC: 143 MMOL/L (ref 136–145)
TESTOST FREE SERPL-MCNC: 3.5 PG/ML (ref 6.6–18.1)
TESTOST SERPL-MCNC: 92 NG/DL (ref 264–916)
TRIGL SERPL-MCNC: 222 MG/DL (ref 0–150)
VLDLC SERPL CALC-MCNC: 44.4 MG/DL

## 2019-05-28 RX ORDER — APIXABAN 5 MG/1
TABLET, FILM COATED ORAL
Qty: 180 TABLET | Refills: 0 | Status: SHIPPED | OUTPATIENT
Start: 2019-05-28 | End: 2019-08-06 | Stop reason: SDUPTHER

## 2019-05-28 RX ORDER — LINAGLIPTIN 5 MG/1
5 TABLET, FILM COATED ORAL DAILY
Qty: 90 TABLET | Refills: 1 | Status: CANCELLED | OUTPATIENT
Start: 2019-05-28

## 2019-05-28 RX ORDER — EMPAGLIFLOZIN 25 MG/1
1 TABLET, FILM COATED ORAL DAILY
Qty: 90 TABLET | Refills: 0 | Status: SHIPPED | OUTPATIENT
Start: 2019-05-28 | End: 2019-05-31 | Stop reason: SDUPTHER

## 2019-05-28 RX ORDER — EMPAGLIFLOZIN 25 MG/1
TABLET, FILM COATED ORAL
Qty: 90 TABLET | Refills: 1 | Status: CANCELLED | OUTPATIENT
Start: 2019-05-28

## 2019-05-31 RX ORDER — EMPAGLIFLOZIN 25 MG/1
1 TABLET, FILM COATED ORAL DAILY
Qty: 90 TABLET | Refills: 0 | Status: SHIPPED | OUTPATIENT
Start: 2019-05-31 | End: 2019-06-06 | Stop reason: SDUPTHER

## 2019-06-06 ENCOUNTER — OFFICE VISIT (OUTPATIENT)
Dept: ENDOCRINOLOGY | Age: 79
End: 2019-06-06

## 2019-06-06 VITALS
BODY MASS INDEX: 32.91 KG/M2 | WEIGHT: 243 LBS | SYSTOLIC BLOOD PRESSURE: 138 MMHG | HEIGHT: 72 IN | DIASTOLIC BLOOD PRESSURE: 82 MMHG

## 2019-06-06 DIAGNOSIS — I10 BENIGN ESSENTIAL HYPERTENSION: ICD-10-CM

## 2019-06-06 DIAGNOSIS — E55.9 VITAMIN D DEFICIENCY: ICD-10-CM

## 2019-06-06 DIAGNOSIS — N40.0 BENIGN NON-NODULAR PROSTATIC HYPERPLASIA WITHOUT LOWER URINARY TRACT SYMPTOMS: ICD-10-CM

## 2019-06-06 DIAGNOSIS — E11.65 TYPE 2 DIABETES MELLITUS WITH HYPERGLYCEMIA, UNSPECIFIED WHETHER LONG TERM INSULIN USE (HCC): Primary | ICD-10-CM

## 2019-06-06 DIAGNOSIS — R80.9 MICROALBUMINURIA: ICD-10-CM

## 2019-06-06 DIAGNOSIS — E78.5 HYPERLIPIDEMIA, UNSPECIFIED HYPERLIPIDEMIA TYPE: ICD-10-CM

## 2019-06-06 DIAGNOSIS — E66.9 OBESITY, UNSPECIFIED CLASSIFICATION, UNSPECIFIED OBESITY TYPE, UNSPECIFIED WHETHER SERIOUS COMORBIDITY PRESENT: ICD-10-CM

## 2019-06-06 DIAGNOSIS — E29.1 HYPOGONADISM IN MALE: ICD-10-CM

## 2019-06-06 DIAGNOSIS — E11.65 TYPE 2 DIABETES MELLITUS WITH HYPERGLYCEMIA (HCC): ICD-10-CM

## 2019-06-06 PROCEDURE — 99214 OFFICE O/P EST MOD 30 MIN: CPT | Performed by: NURSE PRACTITIONER

## 2019-06-06 RX ORDER — EMPAGLIFLOZIN 25 MG/1
1 TABLET, FILM COATED ORAL DAILY
Qty: 90 TABLET | Refills: 1 | Status: SHIPPED | OUTPATIENT
Start: 2019-06-06 | End: 2019-10-25 | Stop reason: SDUPTHER

## 2019-06-06 RX ORDER — EZETIMIBE AND SIMVASTATIN 10; 40 MG/1; MG/1
TABLET ORAL
Qty: 90 TABLET | Refills: 1 | Status: SHIPPED | OUTPATIENT
Start: 2019-06-06 | End: 2019-06-19 | Stop reason: SDUPTHER

## 2019-06-06 RX ORDER — ERGOCALCIFEROL 1.25 MG/1
CAPSULE ORAL
Qty: 24 CAPSULE | Refills: 1 | Status: SHIPPED | OUTPATIENT
Start: 2019-06-06 | End: 2019-06-11 | Stop reason: SDUPTHER

## 2019-06-06 NOTE — PROGRESS NOTES
"Subjective   Noel Garcia is a 79 y.o. male is here today for follow-up.  Chief Complaint   Patient presents with   • Diabetes     recent labs, testing BG 3 times daily, pt brought meter   • Hyperlipidemia   • Hypertension   • Hypogonadism   • Vitamin D Deficiency     /82   Ht 182.9 cm (72\")   Wt 110 kg (243 lb)   BMI 32.96 kg/m²   Social History     Socioeconomic History   • Marital status:      Spouse name: Not on file   • Number of children: Not on file   • Years of education: Not on file   • Highest education level: Not on file   Tobacco Use   • Smoking status: Former Smoker     Types: Cigarettes   • Smokeless tobacco: Never Used   Substance and Sexual Activity   • Alcohol use: No   • Drug use: No   • Sexual activity: Defer     Family History   Problem Relation Age of Onset   • Hypertension Father    • Coronary artery disease Father    • Diabetes Father    • Hyperlipidemia Father    • Coronary artery disease Brother      Current Outpatient Medications on File Prior to Visit   Medication Sig   • ACCU-CHEK ROSETTA PLUS test strip USE 3 TIMES DAILY AS  DIRECTED   • ACCU-CHEK FASTCLIX LANCETS misc Check 3 times daily   • aspirin 325 MG tablet Take 1 tablet by mouth daily.   • ELIQUIS 5 MG tablet tablet TAKE 1 TABLET BY MOUTH  EVERY 12 HOURS   • ergocalciferol (ERGOCALCIFEROL) 43465 units capsule Take 1 capsule twice weekly   • ezetimibe-simvastatin (VYTORIN) 10-40 MG per tablet Take 1 po daily   • Insulin Glargine (TOUJEO MAX SOLOSTAR) 300 UNIT/ML solution pen-injector Inject 140 Units under the skin into the appropriate area as directed Daily.   • Insulin Lispro (HUMALOG) 100 UNIT/ML solution pen-injector 11 units prior to each meal. Skip dose if you skip meal (Patient taking differently: Inject 12 Units under the skin into the appropriate area as directed 3 (Three) Times a Day. 11 units prior to each meal. Skip dose if you skip meal)   • Insulin Pen Needle (B-D UF III MINI PEN NEEDLES) 31G X 5 MM " misc USE 1 PENNEEDLE ONCE DAILY   • JARDIANCE 25 MG tablet Take 25 mg by mouth Daily.   • linagliptin (TRADJENTA) 5 MG tablet tablet Take 1 tablet by mouth Daily.   • metFORMIN (GLUCOPHAGE) 500 MG tablet TAKE 2 TABLETS BY MOUTH TWO TIMES DAILY WITH MEALS   • omeprazole (priLOSEC) 20 MG capsule TAKE 1 CAPSULE BY MOUTH  DAILY   • [DISCONTINUED] Insulin Pen Needle 31G X 5 MM misc Use to inject insulin 4x daily     No current facility-administered medications on file prior to visit.        Allergies   Allergen Reactions   • Gemfibrozil Unknown (See Comments)     Patient doesn't remember    • Atorvastatin Rash         History of Present Illness  Encounter Diagnoses   Name Primary?   • Benign essential hypertension    • Hyperlipidemia, unspecified hyperlipidemia type    • Vitamin D deficiency    • Type 2 diabetes mellitus with hyperglycemia, unspecified whether long term insulin use (CMS/[facility]) Yes   • Hypogonadism in male      Patient is an 79-year-old male being seen today for routine follow-up.  He is being seen for the above-mentioned problems.  He has had recent lab work which was reviewed and patient was provided with a copy.  He brought his glucometer today.  His fasting blood sugars range from 100-260s.  His evening blood sugars range from 180s to 400s, however patient states that some of these blood sugars were before dinner and others were right after.  He does report that he has had rare low blood sugars.  He does not test his blood sugar when he feels like he is having a hypoglycemic event.  He states that he just drinks orange juice as soon as he feels the event.  Patient does take Toujeo max 140 units/day.  He also takes 12 units of Humalog with breakfast and 14 units of Humalog with supper.  He states that he often skips lunch and when he does have lunch he does not take his Humalog with this meal.  He states that he has not been compliant with his diet often eating sweets.  Patient does have low testosterone  on his recent labs.  He states he has taken testosterone supplement in the past and did not feel that it made a difference for him.  He is not interested in testosterone supplement at this time.  He states that he is also taking fish oil supplement in the past which he did not tolerate.  =  The following portions of the patient's history were reviewed and updated as appropriate: allergies, current medications, past family history, past medical history, past social history, past surgical history and problem list.    Review of Systems   Constitutional: Negative for fatigue.   HENT: Negative for trouble swallowing.    Eyes: Negative for visual disturbance.   Cardiovascular: Negative for leg swelling.   Endocrine: Negative for polyuria.   Skin: Negative for wound.   Neurological: Negative for numbness.       Objective   Physical Exam   Constitutional: He is oriented to person, place, and time. He appears well-developed and well-nourished. No distress.   HENT:   Head: Normocephalic and atraumatic.   Right Ear: External ear normal.   Left Ear: External ear normal.   Nose: Nose normal.   Eyes: Pupils are equal, round, and reactive to light. Right eye exhibits no discharge. Left eye exhibits no discharge.   Neck: Normal range of motion. Neck supple. Carotid bruit is not present. No tracheal deviation, no edema and no erythema present. No thyromegaly present.   Cardiovascular: Normal rate, regular rhythm and intact distal pulses. Exam reveals no gallop and no friction rub.   No murmur heard.  Pulmonary/Chest: Breath sounds normal. No respiratory distress. He has no wheezes. He has no rales.   Abdominal: Soft. Bowel sounds are normal. He exhibits no distension. There is no tenderness.   Musculoskeletal: Normal range of motion. He exhibits edema. He exhibits no deformity.   1+ edema bilateral lower extremities   Lymphadenopathy:     He has no cervical adenopathy.   Neurological: He is alert and oriented to person, place, and  time. Coordination normal.   Skin: Skin is warm and dry. No rash noted. He is not diaphoretic. No erythema. No pallor.   Psychiatric: He has a normal mood and affect. His behavior is normal. Judgment and thought content normal.   Nursing note and vitals reviewed.     Results for orders placed or performed in visit on 05/23/19   TestT+TestF+SHBG   Result Value Ref Range    Testosterone, Total 92 (L) 264 - 916 ng/dL    Testosterone, Free 3.5 (L) 6.6 - 18.1 pg/mL    Sex Hormone Binding Globulin 22.3 19.3 - 76.4 nmol/L   Hemoglobin & Hematocrit, Blood   Result Value Ref Range    Hemoglobin 14.2 13.0 - 17.7 g/dL    Hematocrit 47.7 37.5 - 51.0 %   PSA DIAGNOSTIC   Result Value Ref Range    PSA 0.550 0.000 - 4.000 ng/mL   C-Peptide   Result Value Ref Range    C-Peptide 1.9 1.1 - 4.4 ng/mL   Hemoglobin A1c   Result Value Ref Range    Hemoglobin A1C 9.00 (H) 4.80 - 5.60 %   Vitamin D 25 Hydroxy   Result Value Ref Range    25 Hydroxy, Vitamin D 41.6 30.0 - 100.0 ng/ml   Lipid Panel   Result Value Ref Range    Total Cholesterol 100 0 - 200 mg/dL    Triglycerides 222 (H) 0 - 150 mg/dL    HDL Cholesterol 23 (L) 40 - 60 mg/dL    VLDL Cholesterol 44.4 mg/dL    LDL Cholesterol  33 0 - 100 mg/dL   Comprehensive Metabolic Panel   Result Value Ref Range    Glucose 153 (H) 65 - 99 mg/dL    BUN 16 8 - 23 mg/dL    Creatinine 1.00 0.76 - 1.27 mg/dL    eGFR Non African Am 72 >60 mL/min/1.73    eGFR African Am 87 >60 mL/min/1.73    BUN/Creatinine Ratio 16.0 7.0 - 25.0    Sodium 143 136 - 145 mmol/L    Potassium 4.8 3.5 - 5.2 mmol/L    Chloride 102 98 - 107 mmol/L    Total CO2 25.8 22.0 - 29.0 mmol/L    Calcium 9.7 8.6 - 10.5 mg/dL    Total Protein 6.5 6.0 - 8.5 g/dL    Albumin 4.00 3.50 - 5.20 g/dL    Globulin 2.5 gm/dL    A/G Ratio 1.6 g/dL    Total Bilirubin 0.3 0.2 - 1.2 mg/dL    Alkaline Phosphatase 70 39 - 117 U/L    AST (SGOT) 12 1 - 40 U/L    ALT (SGPT) 17 1 - 41 U/L   Cardiovascular Risk Assessment   Result Value Ref Range     Interpretation Note    Diabetes Patient Education   Result Value Ref Range    PDF Image Not applicable            Assessment/Plan   Problems Addressed this Visit        Cardiovascular and Mediastinum    Benign essential hypertension    Hyperlipidemia       Digestive    Vitamin D deficiency       Endocrine    Diabetes mellitus (CMS/HCC) - Primary    Hypogonadism in male        In summary patient was seen and examined.  His diabetes remains uncontrolled.  His hemoglobin A1c has been reduced to 9.0 which is still above goal.  He was instructed to increase his mealtime insulin to Humalog 15 units with all of his meals.  He was instructed that if he skips a meal he should also skip the NovoLog dose.  He will continue his Toujeo max at its current dose.  He was instructed to start the Freestyle sj for more consistent blood sugar readings.  A freestyle sj was applied today.  He was educated regarding consistent carbohydrate intake.  His blood pressure is within satisfactory range.  His vitamin D is within satisfactory range.  Patient declines testosterone supplement at this time.  Patient cholesterol within satisfactory range.  Triglycerides elevated, however patient has not tolerated treatment in the past.  We will continue to monitor.  Patient instructed to call the office if he has any questions or concerns.  Patient will follow-up again in 4 months with labs prior.        Wear Freestyle Sj  Take mealtime insulin 15 units up to 3 times daily. Skip dose if you skip meal

## 2019-06-11 DIAGNOSIS — E29.1 HYPOGONADISM IN MALE: ICD-10-CM

## 2019-06-11 DIAGNOSIS — I10 BENIGN ESSENTIAL HYPERTENSION: ICD-10-CM

## 2019-06-11 DIAGNOSIS — R80.9 MICROALBUMINURIA: ICD-10-CM

## 2019-06-11 DIAGNOSIS — N40.0 BENIGN NON-NODULAR PROSTATIC HYPERPLASIA WITHOUT LOWER URINARY TRACT SYMPTOMS: ICD-10-CM

## 2019-06-11 DIAGNOSIS — E78.5 HYPERLIPIDEMIA, UNSPECIFIED HYPERLIPIDEMIA TYPE: ICD-10-CM

## 2019-06-11 DIAGNOSIS — E55.9 VITAMIN D DEFICIENCY: ICD-10-CM

## 2019-06-11 DIAGNOSIS — E66.9 OBESITY, UNSPECIFIED CLASSIFICATION, UNSPECIFIED OBESITY TYPE, UNSPECIFIED WHETHER SERIOUS COMORBIDITY PRESENT: ICD-10-CM

## 2019-06-11 RX ORDER — ERGOCALCIFEROL 1.25 MG/1
CAPSULE ORAL
Qty: 24 CAPSULE | Refills: 1 | Status: SHIPPED | OUTPATIENT
Start: 2019-06-11 | End: 2019-10-25 | Stop reason: SDUPTHER

## 2019-06-19 DIAGNOSIS — E78.5 HYPERLIPIDEMIA, UNSPECIFIED HYPERLIPIDEMIA TYPE: ICD-10-CM

## 2019-06-19 DIAGNOSIS — I10 BENIGN ESSENTIAL HYPERTENSION: ICD-10-CM

## 2019-06-19 DIAGNOSIS — N40.0 BENIGN NON-NODULAR PROSTATIC HYPERPLASIA WITHOUT LOWER URINARY TRACT SYMPTOMS: ICD-10-CM

## 2019-06-19 DIAGNOSIS — E11.65 TYPE 2 DIABETES MELLITUS WITH HYPERGLYCEMIA (HCC): ICD-10-CM

## 2019-06-19 DIAGNOSIS — E66.9 OBESITY, UNSPECIFIED CLASSIFICATION, UNSPECIFIED OBESITY TYPE, UNSPECIFIED WHETHER SERIOUS COMORBIDITY PRESENT: ICD-10-CM

## 2019-06-19 DIAGNOSIS — E29.1 HYPOGONADISM IN MALE: ICD-10-CM

## 2019-06-19 DIAGNOSIS — E55.9 VITAMIN D DEFICIENCY: ICD-10-CM

## 2019-06-19 DIAGNOSIS — R80.9 MICROALBUMINURIA: ICD-10-CM

## 2019-06-19 RX ORDER — LINAGLIPTIN 5 MG/1
TABLET, FILM COATED ORAL
Qty: 90 TABLET | Refills: 0 | Status: SHIPPED | OUTPATIENT
Start: 2019-06-19 | End: 2019-08-07 | Stop reason: SDUPTHER

## 2019-06-19 RX ORDER — EZETIMIBE AND SIMVASTATIN 10; 40 MG/1; MG/1
TABLET ORAL
Qty: 90 TABLET | Refills: 1 | Status: SHIPPED | OUTPATIENT
Start: 2019-06-19 | End: 2019-10-25 | Stop reason: SDUPTHER

## 2019-06-19 RX ORDER — INSULIN LISPRO 100 [IU]/ML
INJECTION, SOLUTION INTRAVENOUS; SUBCUTANEOUS
Qty: 30 ML | Refills: 0 | Status: SHIPPED | OUTPATIENT
Start: 2019-06-19 | End: 2019-08-07 | Stop reason: SDUPTHER

## 2019-06-19 RX ORDER — FLASH GLUCOSE SENSOR
1 KIT MISCELLANEOUS
Qty: 6 EACH | Refills: 1 | Status: SHIPPED | OUTPATIENT
Start: 2019-06-19 | End: 2020-10-22 | Stop reason: SDUPTHER

## 2019-06-28 DIAGNOSIS — E29.1 HYPOGONADISM IN MALE: ICD-10-CM

## 2019-06-28 DIAGNOSIS — E78.5 HYPERLIPIDEMIA, UNSPECIFIED HYPERLIPIDEMIA TYPE: ICD-10-CM

## 2019-06-28 DIAGNOSIS — I10 BENIGN ESSENTIAL HYPERTENSION: ICD-10-CM

## 2019-06-28 DIAGNOSIS — R80.9 MICROALBUMINURIA: ICD-10-CM

## 2019-06-28 DIAGNOSIS — E55.9 VITAMIN D DEFICIENCY: ICD-10-CM

## 2019-06-28 DIAGNOSIS — E66.9 OBESITY, UNSPECIFIED CLASSIFICATION, UNSPECIFIED OBESITY TYPE, UNSPECIFIED WHETHER SERIOUS COMORBIDITY PRESENT: ICD-10-CM

## 2019-06-28 DIAGNOSIS — E11.65 TYPE 2 DIABETES MELLITUS WITH HYPERGLYCEMIA (HCC): ICD-10-CM

## 2019-06-28 DIAGNOSIS — N40.0 BENIGN NON-NODULAR PROSTATIC HYPERPLASIA WITHOUT LOWER URINARY TRACT SYMPTOMS: ICD-10-CM

## 2019-07-11 ENCOUNTER — TELEPHONE (OUTPATIENT)
Dept: ENDOCRINOLOGY | Age: 79
End: 2019-07-11

## 2019-07-11 NOTE — TELEPHONE ENCOUNTER
Chart notes have been sent    ----- Message from Sherry Coelho sent at 7/11/2019 11:56 AM EDT -----  Contact: [facility]gard [facility] [phone], fax [phone] checking status of request sent on 7-3-19 for chart notes and asked if they were received.

## 2019-08-02 ENCOUNTER — TELEPHONE (OUTPATIENT)
Dept: ENDOCRINOLOGY | Age: 79
End: 2019-08-02

## 2019-08-02 DIAGNOSIS — E66.9 OBESITY, UNSPECIFIED CLASSIFICATION, UNSPECIFIED OBESITY TYPE, UNSPECIFIED WHETHER SERIOUS COMORBIDITY PRESENT: ICD-10-CM

## 2019-08-02 DIAGNOSIS — N40.0 BENIGN NON-NODULAR PROSTATIC HYPERPLASIA WITHOUT LOWER URINARY TRACT SYMPTOMS: ICD-10-CM

## 2019-08-02 DIAGNOSIS — E78.5 HYPERLIPIDEMIA, UNSPECIFIED HYPERLIPIDEMIA TYPE: ICD-10-CM

## 2019-08-02 DIAGNOSIS — R80.9 MICROALBUMINURIA: ICD-10-CM

## 2019-08-02 DIAGNOSIS — E11.65 TYPE 2 DIABETES MELLITUS WITH HYPERGLYCEMIA (HCC): ICD-10-CM

## 2019-08-02 DIAGNOSIS — E55.9 VITAMIN D DEFICIENCY: ICD-10-CM

## 2019-08-02 DIAGNOSIS — I10 BENIGN ESSENTIAL HYPERTENSION: ICD-10-CM

## 2019-08-02 DIAGNOSIS — E29.1 HYPOGONADISM IN MALE: ICD-10-CM

## 2019-08-02 RX ORDER — PIOGLITAZONE HCL AND METFORMIN HCL 850; 15 MG/1; MG/1
TABLET ORAL
Qty: 180 TABLET | Refills: 0 | Status: SHIPPED | OUTPATIENT
Start: 2019-08-02 | End: 2019-10-25

## 2019-08-02 RX ORDER — GLIMEPIRIDE 4 MG/1
8 TABLET ORAL EVERY MORNING
Qty: 180 TABLET | Refills: 0 | Status: SHIPPED | OUTPATIENT
Start: 2019-08-02 | End: 2019-10-25

## 2019-08-02 RX ORDER — CANAGLIFLOZIN 300 MG/1
TABLET, FILM COATED ORAL
Qty: 90 TABLET | Refills: 0 | Status: SHIPPED | OUTPATIENT
Start: 2019-08-02 | End: 2019-10-25

## 2019-08-02 RX ORDER — LANCETS
EACH MISCELLANEOUS
Qty: 306 EACH | Refills: 0 | Status: SHIPPED | OUTPATIENT
Start: 2019-08-02 | End: 2020-10-22 | Stop reason: SDUPTHER

## 2019-08-02 NOTE — TELEPHONE ENCOUNTER
rx sent    ----- Message from Sherry Coelho sent at 8/2/2019 12:08 PM EDT -----  Contact: patient  Patient said his BD ultra fine mini Pen needles, 5lew30K script is for 1 xday and he said he uses 3 to 4 xday and asking for script for 3 to 4 xday, [facility], 90 day supply     He said he has enough to last for a week.     Pt -[phone]

## 2019-08-06 RX ORDER — EMPAGLIFLOZIN 25 MG/1
TABLET, FILM COATED ORAL
Qty: 90 TABLET | Refills: 0 | Status: SHIPPED | OUTPATIENT
Start: 2019-08-06 | End: 2019-10-25 | Stop reason: SDUPTHER

## 2019-08-06 RX ORDER — APIXABAN 5 MG/1
TABLET, FILM COATED ORAL
Qty: 180 TABLET | Refills: 0 | Status: SHIPPED | OUTPATIENT
Start: 2019-08-06 | End: 2020-01-22

## 2019-08-06 RX ORDER — FUROSEMIDE 40 MG/1
40 TABLET ORAL DAILY
Qty: 90 TABLET | Refills: 1 | Status: SHIPPED | OUTPATIENT
Start: 2019-08-06 | End: 2020-01-01

## 2019-08-06 RX ORDER — INSULIN GLARGINE 300 U/ML
INJECTION, SOLUTION SUBCUTANEOUS
Qty: 54 ML | Refills: 0 | Status: SHIPPED | OUTPATIENT
Start: 2019-08-06 | End: 2019-10-25 | Stop reason: SDUPTHER

## 2019-08-07 RX ORDER — INSULIN LISPRO 100 [IU]/ML
INJECTION, SOLUTION INTRAVENOUS; SUBCUTANEOUS
Qty: 30 ML | Refills: 0 | Status: SHIPPED | OUTPATIENT
Start: 2019-08-07 | End: 2019-10-25

## 2019-08-07 RX ORDER — LINAGLIPTIN 5 MG/1
TABLET, FILM COATED ORAL
Qty: 90 TABLET | Refills: 0 | Status: SHIPPED | OUTPATIENT
Start: 2019-08-07 | End: 2019-10-25 | Stop reason: SDUPTHER

## 2019-08-09 RX ORDER — FUROSEMIDE 40 MG/1
TABLET ORAL
Qty: 180 TABLET | Refills: 1 | OUTPATIENT
Start: 2019-08-09

## 2019-10-07 ENCOUNTER — TELEPHONE (OUTPATIENT)
Dept: ENDOCRINOLOGY | Age: 79
End: 2019-10-07

## 2019-10-09 ENCOUNTER — TELEPHONE (OUTPATIENT)
Dept: ENDOCRINOLOGY | Age: 79
End: 2019-10-09

## 2019-10-09 NOTE — TELEPHONE ENCOUNTER
Renae with [facility] ph. [phone], ext. [phone], fax [phone] checking status of request faxed on 9-27-19 for chart notes for CGM order

## 2019-10-09 NOTE — TELEPHONE ENCOUNTER
Wife Evelyn Garcia ph.  [phone] said she called last week requested clinical notes to be faxed to [facility] and she just talked to them and they have not received.

## 2019-10-09 NOTE — TELEPHONE ENCOUNTER
Called [facility] and left a voicemail for la nena letting her know that I had not received a request for clinical notes and also that the patient had an upcoming appt. I have faxed the most recent notes from June to them multiple times.

## 2019-10-11 ENCOUNTER — LAB (OUTPATIENT)
Dept: ENDOCRINOLOGY | Age: 79
End: 2019-10-11

## 2019-10-11 DIAGNOSIS — N40.0 BENIGN NON-NODULAR PROSTATIC HYPERPLASIA WITHOUT LOWER URINARY TRACT SYMPTOMS: ICD-10-CM

## 2019-10-11 DIAGNOSIS — E55.9 VITAMIN D DEFICIENCY: ICD-10-CM

## 2019-10-11 DIAGNOSIS — E11.65 TYPE 2 DIABETES MELLITUS WITH HYPERGLYCEMIA, UNSPECIFIED WHETHER LONG TERM INSULIN USE (HCC): ICD-10-CM

## 2019-10-11 DIAGNOSIS — E78.5 ELEVATED LIPIDS: ICD-10-CM

## 2019-10-11 DIAGNOSIS — E29.1 HYPOGONADISM IN MALE: ICD-10-CM

## 2019-10-11 DIAGNOSIS — E55.9 VITAMIN D DEFICIENCY: Primary | ICD-10-CM

## 2019-10-13 LAB
25(OH)D3+25(OH)D2 SERPL-MCNC: 56.6 NG/ML (ref 30–100)
ALBUMIN SERPL-MCNC: 4 G/DL (ref 3.5–5.2)
ALBUMIN/GLOB SERPL: 1.6 G/DL
ALP SERPL-CCNC: 63 U/L (ref 39–117)
ALT SERPL-CCNC: 17 U/L (ref 1–41)
AST SERPL-CCNC: 15 U/L (ref 1–40)
BILIRUB SERPL-MCNC: 0.2 MG/DL (ref 0.2–1.2)
BUN SERPL-MCNC: 15 MG/DL (ref 8–23)
BUN/CREAT SERPL: 14.3 (ref 7–25)
C PEPTIDE SERPL-MCNC: 1.3 NG/ML (ref 1.1–4.4)
CALCIUM SERPL-MCNC: 9.3 MG/DL (ref 8.6–10.5)
CHLORIDE SERPL-SCNC: 104 MMOL/L (ref 98–107)
CHOLEST SERPL-MCNC: 103 MG/DL (ref 0–200)
CO2 SERPL-SCNC: 26.5 MMOL/L (ref 22–29)
CREAT SERPL-MCNC: 1.05 MG/DL (ref 0.76–1.27)
GLOBULIN SER CALC-MCNC: 2.5 GM/DL
GLUCOSE SERPL-MCNC: 115 MG/DL (ref 65–99)
HBA1C MFR BLD: 9.4 % (ref 4.8–5.6)
HCT VFR BLD AUTO: 42 % (ref 37.5–51)
HDLC SERPL-MCNC: 26 MG/DL (ref 40–60)
HGB BLD-MCNC: 13.3 G/DL (ref 13–17.7)
INTERPRETATION: NORMAL
LDLC SERPL CALC-MCNC: 45 MG/DL (ref 0–100)
Lab: NORMAL
MICROALBUMIN UR-MCNC: 3.9 UG/ML
POTASSIUM SERPL-SCNC: 4.9 MMOL/L (ref 3.5–5.2)
PROT SERPL-MCNC: 6.5 G/DL (ref 6–8.5)
PSA SERPL-MCNC: 0.51 NG/ML (ref 0–4)
SHBG SERPL-SCNC: 21.7 NMOL/L (ref 19.3–76.4)
SODIUM SERPL-SCNC: 142 MMOL/L (ref 136–145)
TESTOST FREE SERPL-MCNC: 4.4 PG/ML (ref 6.6–18.1)
TESTOST SERPL-MCNC: 126 NG/DL (ref 264–916)
TRIGL SERPL-MCNC: 158 MG/DL (ref 0–150)
VLDLC SERPL CALC-MCNC: 31.6 MG/DL

## 2019-10-25 ENCOUNTER — TELEPHONE (OUTPATIENT)
Dept: ENDOCRINOLOGY | Age: 79
End: 2019-10-25

## 2019-10-25 ENCOUNTER — OFFICE VISIT (OUTPATIENT)
Dept: ENDOCRINOLOGY | Age: 79
End: 2019-10-25

## 2019-10-25 VITALS
BODY MASS INDEX: 34 KG/M2 | HEIGHT: 72 IN | DIASTOLIC BLOOD PRESSURE: 74 MMHG | WEIGHT: 251 LBS | SYSTOLIC BLOOD PRESSURE: 132 MMHG

## 2019-10-25 DIAGNOSIS — E66.9 OBESITY, UNSPECIFIED CLASSIFICATION, UNSPECIFIED OBESITY TYPE, UNSPECIFIED WHETHER SERIOUS COMORBIDITY PRESENT: ICD-10-CM

## 2019-10-25 DIAGNOSIS — E11.65 UNCONTROLLED TYPE 2 DIABETES MELLITUS WITH HYPERGLYCEMIA (HCC): Primary | ICD-10-CM

## 2019-10-25 DIAGNOSIS — R80.9 MICROALBUMINURIA: ICD-10-CM

## 2019-10-25 DIAGNOSIS — E78.5 HYPERLIPIDEMIA, UNSPECIFIED HYPERLIPIDEMIA TYPE: ICD-10-CM

## 2019-10-25 DIAGNOSIS — I10 BENIGN ESSENTIAL HYPERTENSION: ICD-10-CM

## 2019-10-25 DIAGNOSIS — I10 ESSENTIAL HYPERTENSION: ICD-10-CM

## 2019-10-25 DIAGNOSIS — E29.1 HYPOGONADISM IN MALE: ICD-10-CM

## 2019-10-25 DIAGNOSIS — E55.9 VITAMIN D DEFICIENCY: ICD-10-CM

## 2019-10-25 DIAGNOSIS — E11.65 TYPE 2 DIABETES MELLITUS WITH HYPERGLYCEMIA (HCC): ICD-10-CM

## 2019-10-25 DIAGNOSIS — N40.0 BENIGN NON-NODULAR PROSTATIC HYPERPLASIA WITHOUT LOWER URINARY TRACT SYMPTOMS: ICD-10-CM

## 2019-10-25 PROBLEM — I50.9 CHF (CONGESTIVE HEART FAILURE) (HCC): Status: ACTIVE | Noted: 2019-09-26

## 2019-10-25 PROCEDURE — 99214 OFFICE O/P EST MOD 30 MIN: CPT | Performed by: NURSE PRACTITIONER

## 2019-10-25 PROCEDURE — 95251 CONT GLUC MNTR ANALYSIS I&R: CPT | Performed by: NURSE PRACTITIONER

## 2019-10-25 RX ORDER — EZETIMIBE AND SIMVASTATIN 10; 40 MG/1; MG/1
TABLET ORAL
Qty: 90 TABLET | Refills: 1 | Status: SHIPPED | OUTPATIENT
Start: 2019-10-25 | End: 2020-03-27 | Stop reason: SDUPTHER

## 2019-10-25 RX ORDER — INSULIN LISPRO 100 [IU]/ML
INJECTION, SOLUTION INTRAVENOUS; SUBCUTANEOUS
Qty: 30 ML | Refills: 1 | Status: SHIPPED | OUTPATIENT
Start: 2019-10-25 | End: 2019-12-13 | Stop reason: SDUPTHER

## 2019-10-25 RX ORDER — ERGOCALCIFEROL 1.25 MG/1
CAPSULE ORAL
Qty: 24 CAPSULE | Refills: 1 | Status: SHIPPED | OUTPATIENT
Start: 2019-10-25 | End: 2020-03-27 | Stop reason: SDUPTHER

## 2019-10-25 RX ORDER — EMPAGLIFLOZIN 25 MG/1
1 TABLET, FILM COATED ORAL DAILY
Qty: 90 TABLET | Refills: 1 | Status: SHIPPED | OUTPATIENT
Start: 2019-10-25 | End: 2020-03-27 | Stop reason: SDUPTHER

## 2019-10-25 RX ORDER — INSULIN LISPRO 100 [IU]/ML
INJECTION, SOLUTION INTRAVENOUS; SUBCUTANEOUS
Qty: 30 ML | Refills: 0
Start: 2019-10-25 | End: 2019-10-25 | Stop reason: SDUPTHER

## 2019-10-25 NOTE — PATIENT INSTRUCTIONS
humalog 15 units with each meal.   Be sure to take with a meal such as a sandwich  Avoid concentrated sweets.

## 2019-10-25 NOTE — TELEPHONE ENCOUNTER
PT CALLED REQUESTING A CALL BACK TO CLARIFY THE AFTER VISIT SUMMARY.  TO STOP THE FOLLOW  GLIMEPIRIDE  INVOKANA  PIOGLITAZON- METFORMIN  PT SAID HE DON'T THINK THAT IS CORRECT.

## 2019-10-25 NOTE — PROGRESS NOTES
"Subjective   Noel Garcia is a 79 y.o. male is here today for follow-up.  Chief Complaint   Patient presents with   • Diabetes     recent labs, kanika download, pt is not taking glimepiride, furosemide   • Hyperlipidemia   • Hypertension   • Hypogonadism   • Vitamin D Deficiency     /74   Ht 182.9 cm (72\")   Wt 114 kg (251 lb)   BMI 34.04 kg/m²   Current Outpatient Medications on File Prior to Visit   Medication Sig   • ACCU-CHEK FASTCLIX LANCETS misc CHECK BLOOD SUGAR 3 TIMES  DAILY   • aspirin 325 MG tablet Take 1 tablet by mouth daily.   • Continuous Blood Gluc Sensor (FREESTYLE KANIKA 14 DAY SENSOR) misc 1 each Every 14 (Fourteen) Days.   • ELIQUIS 5 MG tablet tablet TAKE 1 TABLET BY MOUTH  EVERY 12 HOURS   • ergocalciferol (ERGOCALCIFEROL) 95424 units capsule Take 1 capsule twice weekly   • ezetimibe-simvastatin (VYTORIN) 10-40 MG per tablet TAKE 1 TABLET BY MOUTH  DAILY   • glucose blood (ACCU-CHEK ROSETTA PLUS) test strip 1 each by Other route 3 (Three) Times a Day. as directed   • HUMALOG KWIKPEN 100 UNIT/ML solution pen-injector INJECT SUBCUTANEOUSLY 10  UNITS PRIOR TO EACH MEAL.  SKIP DOSE IF YOU SKIP MEAL. (Patient taking differently: INJECT SUBCUTANEOUSLY 14  UNITS PRIOR TO EACH MEAL.  SKIP DOSE IF YOU SKIP MEAL.)   • Insulin Glargine (TOUJEO MAX SOLOSTAR) 300 UNIT/ML solution pen-injector Inject 140 Units under the skin into the appropriate area as directed Daily.   • Insulin Pen Needle (B-D UF III MINI PEN NEEDLES) 31G X 5 MM misc USE 1 PENNEEDLE 1 TIME  DAILY   • JARDIANCE 25 MG tablet Take 25 mg by mouth Daily.   • metFORMIN (GLUCOPHAGE) 500 MG tablet Take 2 tablets by mouth 2 (Two) Times a Day With Meals.   • omeprazole (priLOSEC) 20 MG capsule TAKE 1 CAPSULE BY MOUTH  DAILY   • TRADJENTA 5 MG tablet tablet TAKE 1 TABLET BY MOUTH  DAILY   • furosemide (LASIX) 40 MG tablet Take 1 tablet by mouth Daily.   • glimepiride (AMARYL) 4 MG tablet TAKE 2 TABLETS BY MOUTH  EVERY MORNING   • " [DISCONTINUED] Insulin Lispro (HUMALOG) 100 UNIT/ML solution pen-injector Inject 12 Units under the skin into the appropriate area as directed 3 (Three) Times a Day.   • [DISCONTINUED] Insulin Pen Needle (B-D UF III MINI PEN NEEDLES) 31G X 5 MM misc USE 1 PENNEEDLE 4 times DAILY   • [DISCONTINUED] INVOKANA 300 MG tablet TAKE 1 TABLET BY MOUTH  DAILY   • [DISCONTINUED] JARDIANCE 25 MG tablet TAKE 1 TABLET BY MOUTH  DAILY   • [DISCONTINUED] pioglitazone-metFORMIN (ACTOPLUS MET)  MG per tablet TAKE 1 TABLET BY MOUTH  TWICE A DAY   • [DISCONTINUED] TOUJEO MAX SOLOSTAR 300 UNIT/ML solution pen-injector INJECT 140 UNITS  SUBCUTANEOUSLY INTO THE  APPROPRIATE AREA AS  DIRECTED DAILY     No current facility-administered medications on file prior to visit.      Family History   Problem Relation Age of Onset   • Hypertension Father    • Coronary artery disease Father    • Diabetes Father    • Hyperlipidemia Father    • Coronary artery disease Brother      Social History     Tobacco Use   • Smoking status: Former Smoker     Types: Cigarettes   • Smokeless tobacco: Never Used   Substance Use Topics   • Alcohol use: No   • Drug use: No     Allergies   Allergen Reactions   • Gemfibrozil Unknown (See Comments)     Patient doesn't remember    • Atorvastatin Rash         History of Present Illness   Encounter Diagnoses   Name Primary?   • Benign essential hypertension    • Hypogonadism in male    • Hyperlipidemia, unspecified hyperlipidemia type    • Essential hypertension    • Uncontrolled type 2 diabetes mellitus with hyperglycemia (CMS/[facility]) Yes   • Vitamin D deficiency      79-year-old male patient here today for a follow-up visit.  He is a poor historian regarding his medication history.  He is being seen for the above diagnoses.  He is having problems in his freestyle Sj from [facility].  He is using a continuous glucose monitoring sensor which was downloaded and reviewed.  He is only in target range of  42% of the  time and he is above target 56% of the time.  He is in target range 42% of the time.  His average blood sugar is 205.  He has had 2% blood sugars less than 70 mg/dL.  He goes to bed with high blood sugars.  His highest blood sugars tend to be in the evening after dinner.  He states sometimes he does not take his insulin until after he eats.  He also snacks on whatever he wants throughout the day.  He has been eating cakes and whatever he feels like snacking on.  He recently had cataracts removed and had his glasses changed.  He has gained weight since his last visit according to his scales.  His medication list was reviewed and updated.  We discussed increasing his Humalog to 15 units and taking more consistently with meals.  Patient considers a sandwich snack and not a meal.  Patient has been advised that based on his blood sugars if his blood sugars are elevated he needs to take insulin with his snacks since they are more likely meals    The following portions of the patient's history were reviewed and updated as appropriate: allergies, current medications, past family history, past medical history, past social history, past surgical history and problem list.    Review of Systems   Constitutional: Negative.    HENT: Negative.    Eyes: Negative.    Cardiovascular: Negative.    Endocrine: Negative.    Skin: Negative.    Neurological: Negative.        Objective   Physical Exam   Constitutional: He is oriented to person, place, and time. He appears well-developed and well-nourished. No distress.   HENT:   Head: Normocephalic and atraumatic.   Right Ear: External ear normal.   Left Ear: External ear normal.   Nose: Nose normal.   Eyes: Pupils are equal, round, and reactive to light. Right eye exhibits no discharge. Left eye exhibits no discharge.   Neck: Normal range of motion. Neck supple. Carotid bruit is not present. No tracheal deviation, no edema and no erythema present. No thyromegaly present.   Cardiovascular:  Normal rate, regular rhythm and intact distal pulses. Exam reveals no gallop and no friction rub.   No murmur heard.  Pulmonary/Chest: Breath sounds normal. No respiratory distress. He has no wheezes. He has no rales.   Abdominal: Soft. Bowel sounds are normal. He exhibits no distension. There is no tenderness.   Musculoskeletal: Normal range of motion. He exhibits edema. He exhibits no deformity.   1+ edema bilateral lower extremities   Lymphadenopathy:     He has no cervical adenopathy.   Neurological: He is alert and oriented to person, place, and time. Coordination normal.   Skin: Skin is warm and dry. No rash noted. He is not diaphoretic. No erythema. No pallor.   Psychiatric: He has a normal mood and affect. His behavior is normal. Judgment and thought content normal.   Nursing note and vitals reviewed.    Results for orders placed or performed in visit on 10/11/19   TestT+TestF+SHBG   Result Value Ref Range    Testosterone, Total 126 (L) 264 - 916 ng/dL    Testosterone, Free 4.4 (L) 6.6 - 18.1 pg/mL    Sex Hormone Binding Globulin 21.7 19.3 - 76.4 nmol/L   Hemoglobin & Hematocrit, Blood   Result Value Ref Range    Hemoglobin 13.3 13.0 - 17.7 g/dL    Hematocrit 42.0 37.5 - 51.0 %   PSA DIAGNOSTIC   Result Value Ref Range    PSA 0.507 0.000 - 4.000 ng/mL   C-Peptide   Result Value Ref Range    C-Peptide 1.3 1.1 - 4.4 ng/mL   Hemoglobin A1c   Result Value Ref Range    Hemoglobin A1C 9.40 (H) 4.80 - 5.60 %   Vitamin D 25 Hydroxy   Result Value Ref Range    25 Hydroxy, Vitamin D 56.6 30.0 - 100.0 ng/ml   MicroAlbumin, Urine, Random - Urine, Clean Catch   Result Value Ref Range    Microalbumin, Urine 3.9 Not Estab. ug/mL   Lipid Panel   Result Value Ref Range    Total Cholesterol 103 0 - 200 mg/dL    Triglycerides 158 (H) 0 - 150 mg/dL    HDL Cholesterol 26 (L) 40 - 60 mg/dL    VLDL Cholesterol 31.6 mg/dL    LDL Cholesterol  45 0 - 100 mg/dL   Comprehensive Metabolic Panel   Result Value Ref Range    Glucose 115  (H) 65 - 99 mg/dL    BUN 15 8 - 23 mg/dL    Creatinine 1.05 0.76 - 1.27 mg/dL    eGFR Non African Am 68 >60 mL/min/1.73    eGFR African Am 82 >60 mL/min/1.73    BUN/Creatinine Ratio 14.3 7.0 - 25.0    Sodium 142 136 - 145 mmol/L    Potassium 4.9 3.5 - 5.2 mmol/L    Chloride 104 98 - 107 mmol/L    Total CO2 26.5 22.0 - 29.0 mmol/L    Calcium 9.3 8.6 - 10.5 mg/dL    Total Protein 6.5 6.0 - 8.5 g/dL    Albumin 4.00 3.50 - 5.20 g/dL    Globulin 2.5 gm/dL    A/G Ratio 1.6 g/dL    Total Bilirubin 0.2 0.2 - 1.2 mg/dL    Alkaline Phosphatase 63 39 - 117 U/L    AST (SGOT) 15 1 - 40 U/L    ALT (SGPT) 17 1 - 41 U/L   Cardiovascular Risk Assessment   Result Value Ref Range    Interpretation Note    Diabetes Patient Education   Result Value Ref Range    PDF Image Not applicable          Assessment/Plan   Problems Addressed this Visit        Cardiovascular and Mediastinum    Benign essential hypertension    Hyperlipidemia    Hypertension       Digestive    Vitamin D deficiency       Endocrine    Hypogonadism in male    Uncontrolled type 2 diabetes mellitus (CMS/[facility]) - Primary          Patient was seen and examined.  His A1c is gone up to 9.4.  He is a poor historian with his medications.  He is forgetful with taking his insulin sometimes.  He is currently on basal insulin 140 units daily and he is on mealtime insulin 14 units.  He is only taking his mealtime insulin 1-2 times daily.  He is staying consistently too high with his blood sugars from 12 PM noon until he goes to bed at night.  He has been advised that he needs to watch what he is eating and be more diligent about covering his carbohydrates with insulin.  We discussed the benefit of being too high versus too low for safety issues.  Because of his age his goal for A1c is 8%.  He will follow-up in 4 months with dr tse or myself with labs prior.  Metabolically and clinically he is stable.  His cholesterol is in good range.  Blood pressure is stable and vitamin D is  stable.  He has low testosterone however he is not on testosterone therapy

## 2019-12-13 RX ORDER — INSULIN LISPRO 100 [IU]/ML
INJECTION, SOLUTION INTRAVENOUS; SUBCUTANEOUS
Qty: 45 ML | Refills: 2 | Status: SHIPPED | OUTPATIENT
Start: 2019-12-13 | End: 2020-03-27 | Stop reason: SDUPTHER

## 2020-01-01 RX ORDER — LINAGLIPTIN 5 MG/1
TABLET, FILM COATED ORAL
Qty: 90 TABLET | Refills: 3 | Status: SHIPPED | OUTPATIENT
Start: 2020-01-01 | End: 2021-01-01 | Stop reason: SDUPTHER

## 2020-01-01 RX ORDER — INSULIN LISPRO 100 [IU]/ML
INJECTION, SOLUTION INTRAVENOUS; SUBCUTANEOUS
Qty: 60 ML | Refills: 1 | Status: SHIPPED | OUTPATIENT
Start: 2020-01-01 | End: 2020-01-01

## 2020-01-01 RX ORDER — FUROSEMIDE 40 MG/1
40 TABLET ORAL DAILY
Qty: 90 TABLET | Refills: 1 | Status: SHIPPED | OUTPATIENT
Start: 2020-01-01

## 2020-01-01 RX ORDER — INSULIN LISPRO 100 [IU]/ML
INJECTION, SOLUTION INTRAVENOUS; SUBCUTANEOUS
Qty: 60 ML | Refills: 3 | Status: SHIPPED | OUTPATIENT
Start: 2020-01-01 | End: 2021-01-01 | Stop reason: SDUPTHER

## 2020-01-21 ENCOUNTER — TELEPHONE (OUTPATIENT)
Dept: ENDOCRINOLOGY | Age: 80
End: 2020-01-21

## 2020-01-21 NOTE — TELEPHONE ENCOUNTER
----- Message from Cierra Livingston MA sent at 1/17/2020  3:41 PM EST -----  Patient left a voicemail asking for a phone call but did not leave details. Patient saw Danielle wood.

## 2020-01-22 RX ORDER — APIXABAN 5 MG/1
TABLET, FILM COATED ORAL
Qty: 180 TABLET | Refills: 0 | Status: ON HOLD | OUTPATIENT
Start: 2020-01-22 | End: 2020-04-01 | Stop reason: SDUPTHER

## 2020-02-05 ENCOUNTER — OFFICE VISIT (OUTPATIENT)
Dept: CARDIOLOGY | Facility: CLINIC | Age: 80
End: 2020-02-05

## 2020-02-05 VITALS
HEART RATE: 82 BPM | BODY MASS INDEX: 35.7 KG/M2 | HEIGHT: 71 IN | WEIGHT: 255 LBS | DIASTOLIC BLOOD PRESSURE: 75 MMHG | SYSTOLIC BLOOD PRESSURE: 147 MMHG

## 2020-02-05 DIAGNOSIS — R94.31 ABNORMAL EKG: ICD-10-CM

## 2020-02-05 DIAGNOSIS — R06.02 SOB (SHORTNESS OF BREATH): Primary | ICD-10-CM

## 2020-02-05 DIAGNOSIS — I10 BENIGN ESSENTIAL HYPERTENSION: ICD-10-CM

## 2020-02-05 DIAGNOSIS — Z79.01 ANTICOAGULATED: ICD-10-CM

## 2020-02-05 DIAGNOSIS — E78.5 HYPERLIPIDEMIA, UNSPECIFIED HYPERLIPIDEMIA TYPE: ICD-10-CM

## 2020-02-05 DIAGNOSIS — I48.0 PAROXYSMAL ATRIAL FIBRILLATION (HCC): ICD-10-CM

## 2020-02-05 PROCEDURE — 93000 ELECTROCARDIOGRAM COMPLETE: CPT | Performed by: NURSE PRACTITIONER

## 2020-02-05 PROCEDURE — 99214 OFFICE O/P EST MOD 30 MIN: CPT | Performed by: NURSE PRACTITIONER

## 2020-02-05 NOTE — PROGRESS NOTES
Subjective:        Noel Garcia is a 79 y.o. male who here for follow up    No chief complaint on file.  He is here today for a one year follow up for hypertension.    HPI  Noel Garcia is an 78 yo male, who is new to this provider. He has a history of essential hypertension, benign prostatic hyperplasia, diabetes mellitus, GERD, paroxysmal atrial fibrillation on Eliquis, and Hyperlipidemia. He is here today for a one year follow up.  His last lipid profile on 10/11/19 showed , HDL 26, ldl 45, and trig. 158.    Echo on 2/13/19 revealed EF 55%, LAC size is mildly dilated, and no evidence of pericardial effusion. Stress test on 3/19/18 was a normal myocardial perfusion study with no evidence of ischemia.    The following portions of the patient's history were reviewed and updated as appropriate: allergies, current medications, past family history, past medical history, past social history, past surgical history and problem list.    Past Medical History:   Diagnosis Date   • Benign essential hypertension    • Benign prostatic hyperplasia 1/21/2016   • Diabetes mellitus (CMS/HCC)    • Gastroesophageal reflux disease 1/21/2016   • GERD (gastroesophageal reflux disease)    • Hamartoma of lung (CMS/HCC)    • Hyperlipidemia    • Microalbuminuria    • Obesity    • Prostatic hyperplasia    • Type 2 diabetes mellitus (CMS/HCC)    • Vitamin D deficiency          reports that he has quit smoking. His smoking use included cigarettes. He has never used smokeless tobacco. He reports that he does not drink alcohol or use drugs.     Family History   Problem Relation Age of Onset   • Hypertension Father    • Coronary artery disease Father    • Diabetes Father    • Hyperlipidemia Father    • Coronary artery disease Brother        ROS     Review of Systems  Constitutional: No wt loss, fever, fatigue  Gastrointestinal: No nausea, abdominal pain  Behavioral/Psych: No insomnia or anxiety  Cardiovascular: He has shortness of  breath that comes and goes. Denies chest pain, syncope and near syncope.      Objective:           Physical Exam   Constitutional: He is oriented to person, place, and time. He appears well-developed and well-nourished.   HENT:   Head: Normocephalic.   Right Ear: External ear normal.   Left Ear: External ear normal.   Eyes: EOM are normal.   Neck: Normal range of motion. No JVD present.   Cardiovascular: Normal rate, regular rhythm, normal heart sounds and intact distal pulses. Exam reveals no gallop and no friction rub.   No murmur heard.  Pulmonary/Chest: Effort normal and breath sounds normal. No stridor. No respiratory distress. He has no rales.   Abdominal: Soft. Bowel sounds are normal. He exhibits no distension. There is no tenderness. There is no guarding.   Musculoskeletal: Normal range of motion. He exhibits no edema or tenderness.   Neurological: He is alert and oriented to person, place, and time. He has normal reflexes.   Skin: Skin is warm.   Psychiatric: He has a normal mood and affect. Judgment normal.   Nursing note and vitals reviewed.        ECG 12 Lead  Date/Time: 2/5/2020 11:54 AM  Performed by: Livier Jay APRN  Authorized by: Livier Jay APRN   Comparison: compared with previous ECG   Rhythm: sinus rhythm  Conduction: right bundle branch block        compared to 4/12/18       Interpretation Summary     · Left atrial cavity size is mildly dilated.  · Calculated EF = 55%.  · There is no evidence of pericardial effusion      Interpretation Summary     · Left ventricular ejection fraction is mildly reduced (Calculated EF = 40%).  · Myocardial perfusion imaging indicates a normal myocardial perfusion study with no evidence of ischemia.  · Impressions are consistent with a low risk study.            Current Outpatient Medications:   •  ACCU-CHEK FASTCLIX LANCETS misc, CHECK BLOOD SUGAR 3 TIMES  DAILY, Disp: 306 each, Rfl: 0  •  aspirin 325 MG tablet, Take 1 tablet by mouth daily.,  Disp: , Rfl:   •  Continuous Blood Gluc Sensor (FREESTYLE KANIKA 14 DAY SENSOR) [facility], 1 each Every 14 (Fourteen) Days., Disp: 6 each, Rfl: 1  •  ELIQUIS 5 MG tablet tablet, TAKE 1 TABLET BY MOUTH  EVERY 12 HOURS, Disp: 180 tablet, Rfl: 0  •  ergocalciferol (ERGOCALCIFEROL) 1.25 MG (40001 UT) capsule, Take 1 capsule twice weekly, Disp: 24 capsule, Rfl: 1  •  ezetimibe-simvastatin (VYTORIN) 10-40 MG per tablet, TAKE 1 TABLET BY MOUTH  DAILY, Disp: 90 tablet, Rfl: 1  •  furosemide (LASIX) 40 MG tablet, Take 1 tablet by mouth Daily., Disp: 90 tablet, Rfl: 1  •  glucose blood (ACCU-CHEK ROSETTA PLUS) test strip, 1 each by Other route 3 (Three) Times a Day. as directed, Disp: 300 each, Rfl: 1  •  HUMALOG KWIKPEN 100 UNIT/ML solution pen-injector, INJECT 15 UNITS  SUBCUTANEOUSLY PRIOR TO  EACH MEAL, Disp: 45 mL, Rfl: 2  •  Insulin Glargine, 2 Unit Dial, (TOUJEO) 300 UNIT/ML solution pen-injector injection, Inject 140 Units under the skin into the appropriate area as directed Daily., Disp: 54 mL, Rfl: 1  •  Insulin Pen Needle (B-D UF III MINI PEN NEEDLES) 31G X 5 MM misc, USE 4 TIMES DAILY FOR INSULIN INJECTION, Disp: 400 each, Rfl: 1  •  JARDIANCE 25 MG tablet, Take 25 mg by mouth Daily., Disp: 90 tablet, Rfl: 1  •  linagliptin (TRADJENTA) 5 MG tablet tablet, Take 1 tablet by mouth Daily., Disp: 90 tablet, Rfl: 1  •  metFORMIN (GLUCOPHAGE) 500 MG tablet, Take 2 tablets by mouth 2 (Two) Times a Day With Meals., Disp: 360 tablet, Rfl: 1  •  omeprazole (priLOSEC) 20 MG capsule, TAKE 1 CAPSULE BY MOUTH  DAILY, Disp: 90 capsule, Rfl: 0     Assessment:        Patient Active Problem List   Diagnosis   • Vitamin D deficiency   • Benign essential hypertension   • Diabetes mellitus (CMS/HCC)   • Gastroesophageal reflux disease   • Hyperlipidemia   • Hypogonadism in male   • Microalbuminuria   • Adiposity   • Benign prostatic hyperplasia   • Uncontrolled type 2 diabetes mellitus (CMS/HCC)   • Cerebrovascular accident (CMS/HCC)   •  Elevated lipids   • Hamartoma (CMS/HCC)   • Hypertension   • Malignant melanoma (CMS/HCC)   • Paroxysmal atrial fibrillation (CMS/HCC)   • Anticoagulation adequate   • CHF (congestive heart failure) (CMS/HCC)               Plan:   1. Essential hypertension: Today in the office his blood pressure is slightly elevated.  He is going to do a blood pressure diary and bring it in when he follows up for results of his stress and echo.  Previous echo on 2/13/19 showed EF 55%, LAC size is mildly dilated, and no evidence of pericardial effusion. Stress test on 3/19/18 was a normal myocardial study with no evidence of ischemia..    Educated patient on exercising for at least 30 minutes a day for 2 to 3 days a week. Importance of controlling hypertension and blood pressure checkup on the regular basis has been explained. Hypertension as a silent killer has been discussed. Risk reduction of the weight and regular exercises to control the hypertension has been explained.    2. Hyperlipidemia: His last lipid profile on  10/11/19 , HDL 26, LDL45, and trig. 158.  Continue statin.     Risk of the hyperlipidemia, importance of the treatment has been explained. Pros and cons of the statins has been explained. Regular blood workup as well as side effects including the liver failure, myelopathy death has been explained.     3. Paroxymal atrial fibrillation: On Eliquis.  No bleeding noted    Pros and cons as well as indication of the anticoagulation has been explained to the patient in detail. There are no obvious complications at this stage. Risk of  the bleedings has been explained. Need for the regular blood workup and adjust the dose has been explained. Need for proper follow-up on anticoagulation also has been explained.      4. Abnormal ecg: SR with RBBB, and nonspecific T waves. He will have a stress test and echo.      It was explained to the patient that stress testing carries 85% specificity/sensitivity, and does not rule  out future cardiac event.  Risks of the procedure were explained to the patient including shortness of breath, induction of myocardial infarction, and dizziness.  Patient is agreeable to proceeding with stress testing.     5. Shortness of breath: He will have an echo and stress test.       No diagnosis found.    There are no diagnoses linked to this encounter.    COUNSELING:    Noel Scruggs was given to patient for the following topics: diagnostic results, risk factor reductions, impressions, risks and benefits of treatment options and importance of treatment compliance .       SMOKING COUNSELING: Denies     He will have a stress trest and echo. He will follow up with Dr. Nguyen for results.     Sincerely,   EMILY David  [facility]  2/5/20  1125    EMR Dragon/Transcription disclaimer:   Much of this encounter note is an electronic transcription/translation of spoken language to printed text. The electronic translation of spoken language may permit erroneous, or at times, nonsensical words or phrases to be inadvertently transcribed; Although I have reviewed the note for such errors, some may still exist.

## 2020-02-07 ENCOUNTER — TELEPHONE (OUTPATIENT)
Dept: ENDOCRINOLOGY | Age: 80
End: 2020-02-07

## 2020-02-19 ENCOUNTER — APPOINTMENT (OUTPATIENT)
Dept: CARDIOLOGY | Facility: HOSPITAL | Age: 80
End: 2020-02-19

## 2020-03-09 ENCOUNTER — HOSPITAL ENCOUNTER (OUTPATIENT)
Dept: CARDIOLOGY | Facility: HOSPITAL | Age: 80
Discharge: HOME | End: 2020-03-09

## 2020-03-09 ENCOUNTER — HOSPITAL ENCOUNTER (OUTPATIENT)
Dept: CARDIOLOGY | Facility: HOSPITAL | Age: 80
Discharge: HOME | End: 2020-03-09
Admitting: NURSE PRACTITIONER

## 2020-03-09 VITALS
HEIGHT: 71 IN | OXYGEN SATURATION: 92 % | HEART RATE: 76 BPM | BODY MASS INDEX: 34.98 KG/M2 | SYSTOLIC BLOOD PRESSURE: 132 MMHG | WEIGHT: 249.9 LBS | DIASTOLIC BLOOD PRESSURE: 67 MMHG | RESPIRATION RATE: 16 BRPM

## 2020-03-09 VITALS
WEIGHT: 255 LBS | DIASTOLIC BLOOD PRESSURE: 82 MMHG | SYSTOLIC BLOOD PRESSURE: 152 MMHG | HEIGHT: 71 IN | BODY MASS INDEX: 35.7 KG/M2 | HEART RATE: 78 BPM

## 2020-03-09 PROCEDURE — 93306 TTE W/DOPPLER COMPLETE: CPT

## 2020-03-09 PROCEDURE — 78452 HT MUSCLE IMAGE SPECT MULT: CPT | Performed by: INTERNAL MEDICINE

## 2020-03-09 PROCEDURE — 93017 CV STRESS TEST TRACING ONLY: CPT

## 2020-03-09 PROCEDURE — 78452 HT MUSCLE IMAGE SPECT MULT: CPT

## 2020-03-09 PROCEDURE — 0 TECHNETIUM SESTAMIBI: Performed by: INTERNAL MEDICINE

## 2020-03-09 PROCEDURE — 25010000002 PERFLUTREN (DEFINITY) 8.476 MG IN SODIUM CHLORIDE (PF) 0.9 % 10 ML INJECTION: Performed by: NURSE PRACTITIONER

## 2020-03-09 PROCEDURE — 25010000002 REGADENOSON 0.4 MG/5ML SOLUTION: Performed by: INTERNAL MEDICINE

## 2020-03-09 PROCEDURE — 93306 TTE W/DOPPLER COMPLETE: CPT | Performed by: INTERNAL MEDICINE

## 2020-03-09 PROCEDURE — A9500 TC99M SESTAMIBI: HCPCS | Performed by: INTERNAL MEDICINE

## 2020-03-09 PROCEDURE — 93018 CV STRESS TEST I&R ONLY: CPT | Performed by: INTERNAL MEDICINE

## 2020-03-09 PROCEDURE — 93016 CV STRESS TEST SUPVJ ONLY: CPT | Performed by: INTERNAL MEDICINE

## 2020-03-09 RX ORDER — PIOGLITAZONE HCL AND METFORMIN HCL 850; 15 MG/1; MG/1
1 TABLET ORAL
COMMUNITY
End: 2020-03-16 | Stop reason: ALTCHOICE

## 2020-03-09 RX ORDER — ALBUTEROL SULFATE 90 UG/1
1-2 AEROSOL, METERED RESPIRATORY (INHALATION)
COMMUNITY
Start: 2020-02-20 | End: 2021-01-01

## 2020-03-09 RX ADMIN — REGADENOSON 0.4 MG: 0.08 INJECTION, SOLUTION INTRAVENOUS at 12:25

## 2020-03-09 RX ADMIN — TECHNETIUM TC 99M SESTAMIBI 1 DOSE: 1 INJECTION INTRAVENOUS at 12:25

## 2020-03-09 RX ADMIN — TECHNETIUM TC 99M SESTAMIBI 1 DOSE: 1 INJECTION INTRAVENOUS at 08:16

## 2020-03-09 RX ADMIN — SODIUM CHLORIDE 2 ML: 9 INJECTION INTRAMUSCULAR; INTRAVENOUS; SUBCUTANEOUS at 09:47

## 2020-03-09 NOTE — PRE-PROCEDURE NOTE
"3-4 + pitting edema, pre-tibial both legs,  Able to auscultate fine crackles bilateral posterior lung basis. Review of medication, show patient is not longer taking his Lasix 40 mg daily.  Dr Carr informed of the pre-tibial edema and breath sounds. Patient instructed to resume his Lasix 40 mg daily and his potassium.  Patient does not know dosage of potassium, \"but I have plenty at home\"  He  Reports he will start both this afternoon and is instructed to bring all of his medications with him for his follow up visit.    "

## 2020-03-10 LAB
BH CV ECHO MEAS - ACS: 2 CM
BH CV ECHO MEAS - AO ARCH DIAM (PROXIMAL TRANS.): 2.1 CM
BH CV ECHO MEAS - AO MAX PG (FULL): 3.2 MMHG
BH CV ECHO MEAS - AO MAX PG: 5 MMHG
BH CV ECHO MEAS - AO MEAN PG (FULL): 2 MMHG
BH CV ECHO MEAS - AO MEAN PG: 3 MMHG
BH CV ECHO MEAS - AO ROOT AREA (BSA CORRECTED): 1.5
BH CV ECHO MEAS - AO ROOT AREA: 9.1 CM^2
BH CV ECHO MEAS - AO ROOT DIAM: 3.4 CM
BH CV ECHO MEAS - AO V2 MAX: 112 CM/SEC
BH CV ECHO MEAS - AO V2 MEAN: 79.4 CM/SEC
BH CV ECHO MEAS - AO V2 VTI: 24.5 CM
BH CV ECHO MEAS - ASC AORTA: 2.9 CM
BH CV ECHO MEAS - AVA(I,A): 2.8 CM^2
BH CV ECHO MEAS - AVA(I,D): 2.8 CM^2
BH CV ECHO MEAS - AVA(V,A): 2.3 CM^2
BH CV ECHO MEAS - AVA(V,D): 2.3 CM^2
BH CV ECHO MEAS - BSA(HAYCOCK): 2.4 M^2
BH CV ECHO MEAS - BSA: 2.3 M^2
BH CV ECHO MEAS - BZI_BMI: 35.6 KILOGRAMS/M^2
BH CV ECHO MEAS - BZI_METRIC_HEIGHT: 180.3 CM
BH CV ECHO MEAS - BZI_METRIC_WEIGHT: 115.7 KG
BH CV ECHO MEAS - EDV(CUBED): 50.7 ML
BH CV ECHO MEAS - EDV(MOD-SP2): 139 ML
BH CV ECHO MEAS - EDV(MOD-SP4): 154 ML
BH CV ECHO MEAS - EDV(TEICH): 58.1 ML
BH CV ECHO MEAS - EF(CUBED): 72.7 %
BH CV ECHO MEAS - EF(MOD-BP): 54 %
BH CV ECHO MEAS - EF(MOD-SP2): 54 %
BH CV ECHO MEAS - EF(MOD-SP4): 56.5 %
BH CV ECHO MEAS - EF(TEICH): 65.3 %
BH CV ECHO MEAS - ESV(CUBED): 13.8 ML
BH CV ECHO MEAS - ESV(MOD-SP2): 64 ML
BH CV ECHO MEAS - ESV(MOD-SP4): 67 ML
BH CV ECHO MEAS - ESV(TEICH): 20.2 ML
BH CV ECHO MEAS - FS: 35.1 %
BH CV ECHO MEAS - IVS/LVPW: 1.1
BH CV ECHO MEAS - IVSD: 1.6 CM
BH CV ECHO MEAS - LA DIMENSION: 4.7 CM
BH CV ECHO MEAS - LA/AO: 1.4
BH CV ECHO MEAS - LAT PEAK E' VEL: 9.6 CM/SEC
BH CV ECHO MEAS - LV DIASTOLIC VOL/BSA (35-75): 65.9 ML/M^2
BH CV ECHO MEAS - LV MASS(C)D: 220.1 GRAMS
BH CV ECHO MEAS - LV MASS(C)DI: 94.1 GRAMS/M^2
BH CV ECHO MEAS - LV MAX PG: 1.8 MMHG
BH CV ECHO MEAS - LV MEAN PG: 1 MMHG
BH CV ECHO MEAS - LV SYSTOLIC VOL/BSA (12-30): 28.7 ML/M^2
BH CV ECHO MEAS - LV V1 MAX: 66.5 CM/SEC
BH CV ECHO MEAS - LV V1 MEAN: 49 CM/SEC
BH CV ECHO MEAS - LV V1 VTI: 17.9 CM
BH CV ECHO MEAS - LVIDD: 3.7 CM
BH CV ECHO MEAS - LVIDS: 2.4 CM
BH CV ECHO MEAS - LVLD AP2: 8.7 CM
BH CV ECHO MEAS - LVLD AP4: 8.6 CM
BH CV ECHO MEAS - LVLS AP2: 7.8 CM
BH CV ECHO MEAS - LVLS AP4: 7.2 CM
BH CV ECHO MEAS - LVOT AREA (M): 3.8 CM^2
BH CV ECHO MEAS - LVOT AREA: 3.8 CM^2
BH CV ECHO MEAS - LVOT DIAM: 2.2 CM
BH CV ECHO MEAS - LVPWD: 1.5 CM
BH CV ECHO MEAS - MED PEAK E' VEL: 7.6 CM/SEC
BH CV ECHO MEAS - MV A DUR: 0.18 SEC
BH CV ECHO MEAS - MV A MAX VEL: 82 CM/SEC
BH CV ECHO MEAS - MV DEC SLOPE: 238 CM/SEC^2
BH CV ECHO MEAS - MV DEC TIME: 0.21 SEC
BH CV ECHO MEAS - MV E MAX VEL: 59.7 CM/SEC
BH CV ECHO MEAS - MV E/A: 0.73
BH CV ECHO MEAS - MV MAX PG: 3.2 MMHG
BH CV ECHO MEAS - MV MEAN PG: 1 MMHG
BH CV ECHO MEAS - MV P1/2T MAX VEL: 66 CM/SEC
BH CV ECHO MEAS - MV P1/2T: 81.2 MSEC
BH CV ECHO MEAS - MV V2 MAX: 88.8 CM/SEC
BH CV ECHO MEAS - MV V2 MEAN: 48.7 CM/SEC
BH CV ECHO MEAS - MV V2 VTI: 19.2 CM
BH CV ECHO MEAS - MVA P1/2T LCG: 3.3 CM^2
BH CV ECHO MEAS - MVA(P1/2T): 2.7 CM^2
BH CV ECHO MEAS - MVA(VTI): 3.5 CM^2
BH CV ECHO MEAS - PA ACC TIME: 0.11 SEC
BH CV ECHO MEAS - PA MAX PG (FULL): 1.5 MMHG
BH CV ECHO MEAS - PA MAX PG: 4.2 MMHG
BH CV ECHO MEAS - PA PR(ACCEL): 30.4 MMHG
BH CV ECHO MEAS - PA V2 MAX: 102 CM/SEC
BH CV ECHO MEAS - PI END-D VEL: 153 CM/SEC
BH CV ECHO MEAS - PULM A REVS DUR: 0.17 SEC
BH CV ECHO MEAS - PULM A REVS VEL: 28.1 CM/SEC
BH CV ECHO MEAS - PULM DIAS VEL: 30.1 CM/SEC
BH CV ECHO MEAS - PULM S/D: 1.3
BH CV ECHO MEAS - PULM SYS VEL: 39.8 CM/SEC
BH CV ECHO MEAS - PVA(V,A): 3.3 CM^2
BH CV ECHO MEAS - PVA(V,D): 3.3 CM^2
BH CV ECHO MEAS - QP/QS: 1.1
BH CV ECHO MEAS - RAP SYSTOLE: 3 MMHG
BH CV ECHO MEAS - RV MAX PG: 2.7 MMHG
BH CV ECHO MEAS - RV MEAN PG: 1 MMHG
BH CV ECHO MEAS - RV V1 MAX: 82 CM/SEC
BH CV ECHO MEAS - RV V1 MEAN: 55.8 CM/SEC
BH CV ECHO MEAS - RV V1 VTI: 17.7 CM
BH CV ECHO MEAS - RVOT AREA: 4.2 CM^2
BH CV ECHO MEAS - RVOT DIAM: 2.3 CM
BH CV ECHO MEAS - RVSP: 38 MMHG
BH CV ECHO MEAS - SI(AO): 95.1 ML/M^2
BH CV ECHO MEAS - SI(CUBED): 15.8 ML/M^2
BH CV ECHO MEAS - SI(LVOT): 29.1 ML/M^2
BH CV ECHO MEAS - SI(MOD-SP2): 32.1 ML/M^2
BH CV ECHO MEAS - SI(MOD-SP4): 37.2 ML/M^2
BH CV ECHO MEAS - SI(TEICH): 16.2 ML/M^2
BH CV ECHO MEAS - SV(AO): 222.4 ML
BH CV ECHO MEAS - SV(CUBED): 36.8 ML
BH CV ECHO MEAS - SV(LVOT): 68 ML
BH CV ECHO MEAS - SV(MOD-SP2): 75 ML
BH CV ECHO MEAS - SV(MOD-SP4): 87 ML
BH CV ECHO MEAS - SV(RVOT): 73.5 ML
BH CV ECHO MEAS - SV(TEICH): 38 ML
BH CV ECHO MEAS - TAPSE (>1.6): 2.4 CM
BH CV ECHO MEAS - TR MAX VEL: 296 CM/SEC
BH CV ECHO MEASUREMENTS AVERAGE E/E' RATIO: 6.94
BH CV XLRA - RV BASE: 3.5 CM
BH CV XLRA - RV LENGTH: 7.8 CM
BH CV XLRA - RV MID: 3 CM
BH CV XLRA - TDI S': 12.2 CM/SEC
LEFT ATRIUM VOLUME INDEX: 26 ML/M2

## 2020-03-12 LAB
BH CV STRESS BP STAGE 1: NORMAL
BH CV STRESS COMMENTS STAGE 1: NORMAL
BH CV STRESS DOSE REGADENOSON STAGE 1: 0.4
BH CV STRESS DURATION MIN STAGE 1: 2
BH CV STRESS DURATION SEC STAGE 1: 23
BH CV STRESS GRADE STAGE 1: 0
BH CV STRESS HR STAGE 1: 114
BH CV STRESS METS STAGE 1: 1.6
BH CV STRESS PROTOCOL 1: NORMAL
BH CV STRESS RECOVERY BP: NORMAL MMHG
BH CV STRESS RECOVERY HR: 82 BPM
BH CV STRESS RECOVERY O2: 92 %
BH CV STRESS SPEED STAGE 1: 0.9
BH CV STRESS STAGE 1: 1
LV EF NUC BP: 60 %
MAXIMAL PREDICTED HEART RATE: 139 BPM
PERCENT MAX PREDICTED HR: 82.01 %
STRESS BASELINE BP: NORMAL MMHG
STRESS BASELINE HR: 75 BPM
STRESS O2 SAT REST: 92 %
STRESS PERCENT HR: 96 %
STRESS POST ESTIMATED WORKLOAD: 1.6 METS
STRESS POST EXERCISE DUR MIN: 2 MIN
STRESS POST EXERCISE DUR SEC: 23 SEC
STRESS POST PEAK BP: NORMAL MMHG
STRESS POST PEAK HR: 114 BPM
STRESS TARGET HR: 118 BPM

## 2020-03-14 LAB
25(OH)D3+25(OH)D2 SERPL-MCNC: 54.9 NG/ML (ref 30–100)
ALBUMIN SERPL-MCNC: 3.8 G/DL (ref 3.5–5.2)
ALBUMIN/GLOB SERPL: 1.7 G/DL
ALP SERPL-CCNC: 61 U/L (ref 39–117)
ALT SERPL-CCNC: 19 U/L (ref 1–41)
AST SERPL-CCNC: 10 U/L (ref 1–40)
BILIRUB SERPL-MCNC: 0.3 MG/DL (ref 0.2–1.2)
BUN SERPL-MCNC: 18 MG/DL (ref 8–23)
BUN/CREAT SERPL: 15.3 (ref 7–25)
C PEPTIDE SERPL-MCNC: 2.3 NG/ML (ref 1.1–4.4)
CALCIUM SERPL-MCNC: 9.2 MG/DL (ref 8.6–10.5)
CHLORIDE SERPL-SCNC: 98 MMOL/L (ref 98–107)
CHOLEST SERPL-MCNC: 111 MG/DL (ref 0–200)
CO2 SERPL-SCNC: 28.3 MMOL/L (ref 22–29)
CREAT SERPL-MCNC: 1.18 MG/DL (ref 0.76–1.27)
GLOBULIN SER CALC-MCNC: 2.3 GM/DL
GLUCOSE SERPL-MCNC: 161 MG/DL (ref 65–99)
HBA1C MFR BLD: 10 % (ref 4.8–5.6)
HDLC SERPL-MCNC: 29 MG/DL (ref 40–60)
INTERPRETATION: NORMAL
LDLC SERPL CALC-MCNC: 55 MG/DL (ref 0–100)
Lab: NORMAL
POTASSIUM SERPL-SCNC: 4.5 MMOL/L (ref 3.5–5.2)
PROT SERPL-MCNC: 6.1 G/DL (ref 6–8.5)
SODIUM SERPL-SCNC: 138 MMOL/L (ref 136–145)
T4 SERPL-MCNC: 6.08 MCG/DL (ref 4.5–11.7)
TRIGL SERPL-MCNC: 133 MG/DL (ref 0–150)
TSH SERPL DL<=0.005 MIU/L-ACNC: 2.44 UIU/ML (ref 0.27–4.2)
URATE SERPL-MCNC: 6.5 MG/DL (ref 3.4–7)
VLDLC SERPL CALC-MCNC: 26.6 MG/DL

## 2020-03-16 ENCOUNTER — OFFICE VISIT (OUTPATIENT)
Dept: CARDIOLOGY | Facility: CLINIC | Age: 80
End: 2020-03-16

## 2020-03-16 VITALS
WEIGHT: 250 LBS | HEIGHT: 71 IN | HEART RATE: 102 BPM | DIASTOLIC BLOOD PRESSURE: 78 MMHG | BODY MASS INDEX: 35 KG/M2 | SYSTOLIC BLOOD PRESSURE: 144 MMHG

## 2020-03-16 DIAGNOSIS — I48.0 PAROXYSMAL ATRIAL FIBRILLATION (HCC): ICD-10-CM

## 2020-03-16 DIAGNOSIS — E78.5 HYPERLIPIDEMIA, UNSPECIFIED HYPERLIPIDEMIA TYPE: ICD-10-CM

## 2020-03-16 DIAGNOSIS — I50.9 CONGESTIVE HEART FAILURE, UNSPECIFIED HF CHRONICITY, UNSPECIFIED HEART FAILURE TYPE (HCC): ICD-10-CM

## 2020-03-16 DIAGNOSIS — I10 ESSENTIAL HYPERTENSION: ICD-10-CM

## 2020-03-16 DIAGNOSIS — R94.30 ABNORMAL CARDIAC FUNCTION TEST: Primary | ICD-10-CM

## 2020-03-16 PROCEDURE — 99214 OFFICE O/P EST MOD 30 MIN: CPT | Performed by: INTERNAL MEDICINE

## 2020-03-16 RX ORDER — POTASSIUM CHLORIDE 750 MG/1
10 CAPSULE, EXTENDED RELEASE ORAL 2 TIMES DAILY
COMMUNITY
End: 2020-06-12 | Stop reason: SDUPTHER

## 2020-03-16 NOTE — PROGRESS NOTES
Subjective:        Noel Garcia is a 79 y.o. male who here for follow up    CC  Abnormal cardiac fun test  HPI  79-year-old male with paroxysmal atrial fibrillation, congestive heart failure, hypertension hyperlipidemia here for the follow-up after the abnormal stress test continues to complains of shortness of breath on exertion     Problem List Items Addressed This Visit        Cardiovascular and Mediastinum    Hyperlipidemia    Hypertension    Paroxysmal atrial fibrillation (CMS/HCC)    CHF (congestive heart failure) (CMS/[facility])      Other Visit Diagnoses     Abnormal cardiac function test    -  Primary    Relevant Orders    Case Request Cath Lab: Left Heart Cath        .Interpretation Summary        · Findings consistent with an equivocal ECG stress test.  · Left ventricular ejection fraction is normal (Calculated EF = 60%).  · Myocardial perfusion imaging indicates a small-to-medium-sized, mild-to-moderately severe area of ischemia located in the apex and inferior wall.  · Impressions are consistent with an intermediate risk study.     Interpretation Summary     · Mild tricuspid valve regurgitation is present.  · Right ventricular cavity is mildly dilated.  · Left ventricular diastolic dysfunction (grade I a) consistent with impaired relaxation.  · Calculated EF = 54%.  · There is no evidence of pericardial effusion.         The following portions of the patient's history were reviewed and updated as appropriate: allergies, current medications, past family history, past medical history, past social history, past surgical history and problem list.    Past Medical History:   Diagnosis Date   • Benign essential hypertension    • Benign prostatic hyperplasia 1/21/2016   • Diabetes mellitus (CMS/[facility])    • Gastroesophageal reflux disease 1/21/2016   • GERD (gastroesophageal reflux disease)    • Hamartoma of lung (CMS/[facility])    • Hyperlipidemia    • Microalbuminuria    • Obesity    • Prostatic hyperplasia    • Type 2  "diabetes mellitus (CMS/[facility])    • Vitamin D deficiency      reports that he has quit smoking. His smoking use included cigarettes. He has never used smokeless tobacco. He reports that he does not drink alcohol or use drugs.   Family History   Problem Relation Age of Onset   • Hypertension Father    • Coronary artery disease Father    • Diabetes Father    • Hyperlipidemia Father    • Coronary artery disease Brother        Review of Systems  Constitutional: No wt loss, fever, fatigue  Gastrointestinal: No nausea, abdominal pain  Behavioral/Psych: No insomnia or anxiety   Cardiovascular weakness of breath on exertion  Objective:       Physical Exam  /78   Pulse 102   Ht 180.3 cm (71\")   Wt 113 kg (250 lb)   BMI 34.87 kg/m²   General appearance: No acute changes   Neck: Trachea midline; NECK, supple, no thyromegaly or lymphadenopathy   Lungs: Normal size and shape, normal breath sounds, equal distribution of air, no rales and rhonchi   CV: S1-S2 regular, no murmurs, no rub, no gallop   Abdomen: Soft, non-tender; no masses , no abnormal abdominal sounds   Extremities: No deformity , normal color , no peripheral edema   Skin: Normal temperature, turgor and texture; no rash, ulcers          Procedures      Echocardiogram:        Current Outpatient Medications:   •  albuterol sulfate  (90 Base) MCG/ACT inhaler, Inhale 1-2 puffs., Disp: , Rfl:   •  aspirin 325 MG tablet, Take 1 tablet by mouth daily., Disp: , Rfl:   •  ELIQUIS 5 MG tablet tablet, TAKE 1 TABLET BY MOUTH  EVERY 12 HOURS, Disp: 180 tablet, Rfl: 0  •  ergocalciferol (ERGOCALCIFEROL) 1.25 MG (19907 UT) capsule, Take 1 capsule twice weekly (Patient taking differently: 50,000 Units 2 (Two) Times a Week. Take 1 capsule twice weekly), Disp: 24 capsule, Rfl: 1  •  ezetimibe-simvastatin (VYTORIN) 10-40 MG per tablet, TAKE 1 TABLET BY MOUTH  DAILY, Disp: 90 tablet, Rfl: 1  •  furosemide (LASIX) 40 MG tablet, Take 1 tablet by mouth Daily. (Patient taking " differently: Take 40 mg by mouth As Needed.), Disp: 90 tablet, Rfl: 1  •  HUMALOG KWIKPEN 100 UNIT/ML solution pen-injector, INJECT 15 UNITS  SUBCUTANEOUSLY PRIOR TO  EACH MEAL, Disp: 45 mL, Rfl: 2  •  Insulin Glargine, 2 Unit Dial, (TOUJEO) 300 UNIT/ML solution pen-injector injection, Inject 140 Units under the skin into the appropriate area as directed Daily., Disp: 54 mL, Rfl: 1  •  JARDIANCE 25 MG tablet, Take 25 mg by mouth Daily., Disp: 90 tablet, Rfl: 1  •  linagliptin (TRADJENTA) 5 MG tablet tablet, Take 1 tablet by mouth Daily., Disp: 90 tablet, Rfl: 1  •  metFORMIN (GLUCOPHAGE) 500 MG tablet, Take 2 tablets by mouth 2 (Two) Times a Day With Meals., Disp: 360 tablet, Rfl: 1  •  O2 (OXYGEN), Inhale 2.5 L/min Every Night., Disp: , Rfl:   •  omeprazole (priLOSEC) 20 MG capsule, TAKE 1 CAPSULE BY MOUTH  DAILY, Disp: 90 capsule, Rfl: 0  •  potassium chloride (MICRO-K) 10 MEQ CR capsule, Take 10 mEq by mouth 2 (Two) Times a Day., Disp: , Rfl:   •  tiotropium bromide-olodaterol (STIOLTO RESPIMAT) 2.5-2.5 MCG/ACT aerosol solution inhaler, Inhale 2 puffs Daily., Disp: , Rfl:   •  ACCU-CHEK FASTCLIX LANCETS misc, CHECK BLOOD SUGAR 3 TIMES  DAILY, Disp: 306 each, Rfl: 0  •  Continuous Blood Gluc Sensor (FREESTYLE KANIKA 14 DAY SENSOR) misc, 1 each Every 14 (Fourteen) Days., Disp: 6 each, Rfl: 1  •  glucose blood (ACCU-CHEK ROSETTA PLUS) test strip, 1 each by Other route 3 (Three) Times a Day. as directed, Disp: 300 each, Rfl: 1  •  Insulin Pen Needle (B-D UF III MINI PEN NEEDLES) 31G X 5 MM misc, USE 4 TIMES DAILY FOR INSULIN INJECTION, Disp: 400 each, Rfl: 1   Assessment:        Patient Active Problem List   Diagnosis   • Vitamin D deficiency   • Benign essential hypertension   • Diabetes mellitus (CMS/HCC)   • Gastroesophageal reflux disease   • Hyperlipidemia   • Hypogonadism in male   • Microalbuminuria   • Adiposity   • Benign prostatic hyperplasia   • Uncontrolled type 2 diabetes mellitus (CMS/HCC)   •  Cerebrovascular accident (CMS/HCC)   • Elevated lipids   • Hamartoma (CMS/HCC)   • Hypertension   • Malignant melanoma (CMS/HCC)   • Paroxysmal atrial fibrillation (CMS/[facility])   • Anticoagulation adequate   • CHF (congestive heart failure) (CMS/[facility])               Plan:            ICD-10-CM ICD-9-CM   1. Abnormal cardiac function test R94.30 794.30   2. Paroxysmal atrial fibrillation (CMS/[facility]) I48.0 427.31   3. Essential hypertension I10 401.9   4. Hyperlipidemia, unspecified hyperlipidemia type E78.5 272.4   5. Congestive heart failure, unspecified HF chronicity, unspecified heart failure type (CMS/HCC) I50.9 428.0     1. Abnormal cardiac function test  Procedure, risks and options of cardiac cath explained to pt INCLUDING BUT NOT LIMITED TO MI, STROKE, DEATH, INFECTION HAEMORRHAGE, . Pt understands well and agrees with no further questions.  - Case Request Cath Lab: Left Heart Cath    2. Paroxysmal atrial fibrillation (CMS/[facility])  Controlled    3. Essential hypertension  Blood pressure controlled    4. Hyperlipidemia, unspecified hyperlipidemia type  Continue current medications    5. Congestive heart failure, unspecified HF chronicity, unspecified heart failure type (CMS/HCC)  Compensated       Procedure, risks and options of cardiac cath explained to pt INCLUDING BUT NOT LIMITED TO MI, STROKE, DEATH, INFECTION HAEMORRHAGE, . Pt understands well and agrees with no further questions.  COUNSELING:    Noel Scruggs was given to patient for the following topics: diagnostic results, risk factor reductions, impressions, risks and benefits of treatment options and importance of treatment compliance .       SMOKING COUNSELING:    [unfilled]    Dictated using Dragon dictation

## 2020-03-17 PROBLEM — R94.30 ABNORMAL CARDIAC FUNCTION TEST: Status: ACTIVE | Noted: 2020-03-17

## 2020-03-25 ENCOUNTER — TELEPHONE (OUTPATIENT)
Dept: CARDIOLOGY | Facility: CLINIC | Age: 80
End: 2020-03-25

## 2020-03-25 NOTE — TELEPHONE ENCOUNTER
THIS PATIENT IS SCHEDULED FOR HEART CATH ON 4/1/20-HE HAD ABN STRESS W INTERMEDIATE RISK   IRAIDA FROM  CATH LAB CALLED TO SEE IF WE NEEDED TO MOVE HIM UP  NOW THAT THE SCHEDULE IS CLEARED OR IS HE OKAY TO WAIT

## 2020-03-26 ENCOUNTER — APPOINTMENT (OUTPATIENT)
Dept: CARDIOLOGY | Facility: HOSPITAL | Age: 80
End: 2020-03-26

## 2020-03-27 ENCOUNTER — OFFICE VISIT (OUTPATIENT)
Dept: ENDOCRINOLOGY | Age: 80
End: 2020-03-27

## 2020-03-27 ENCOUNTER — HOSPITAL ENCOUNTER (OUTPATIENT)
Dept: CARDIOLOGY | Facility: HOSPITAL | Age: 80
Discharge: HOME | End: 2020-03-27
Admitting: INTERNAL MEDICINE

## 2020-03-27 ENCOUNTER — APPOINTMENT (OUTPATIENT)
Dept: CARDIOLOGY | Facility: HOSPITAL | Age: 80
End: 2020-03-27

## 2020-03-27 VITALS
SYSTOLIC BLOOD PRESSURE: 120 MMHG | HEIGHT: 71 IN | WEIGHT: 250 LBS | DIASTOLIC BLOOD PRESSURE: 78 MMHG | BODY MASS INDEX: 35 KG/M2

## 2020-03-27 DIAGNOSIS — E11.65 UNCONTROLLED TYPE 2 DIABETES MELLITUS WITH HYPERGLYCEMIA (HCC): Primary | ICD-10-CM

## 2020-03-27 DIAGNOSIS — E29.1 HYPOGONADISM IN MALE: ICD-10-CM

## 2020-03-27 DIAGNOSIS — E55.9 VITAMIN D DEFICIENCY: ICD-10-CM

## 2020-03-27 DIAGNOSIS — E78.5 HYPERLIPIDEMIA, UNSPECIFIED HYPERLIPIDEMIA TYPE: ICD-10-CM

## 2020-03-27 DIAGNOSIS — I10 BENIGN ESSENTIAL HYPERTENSION: ICD-10-CM

## 2020-03-27 DIAGNOSIS — E78.5 ELEVATED LIPIDS: ICD-10-CM

## 2020-03-27 DIAGNOSIS — R80.9 MICROALBUMINURIA: ICD-10-CM

## 2020-03-27 LAB
ANION GAP SERPL CALCULATED.3IONS-SCNC: 13 MMOL/L (ref 5–15)
APTT PPP: 32.6 SECONDS (ref 22.7–35.4)
BASOPHILS # BLD AUTO: 0.05 10*3/MM3 (ref 0–0.2)
BASOPHILS NFR BLD AUTO: 0.7 % (ref 0–1.5)
BUN BLD-MCNC: 22 MG/DL (ref 8–23)
BUN/CREAT SERPL: 20.2 (ref 7–25)
CALCIUM SPEC-SCNC: 9.6 MG/DL (ref 8.6–10.5)
CHLORIDE SERPL-SCNC: 99 MMOL/L (ref 98–107)
CO2 SERPL-SCNC: 26 MMOL/L (ref 22–29)
CREAT BLD-MCNC: 1.09 MG/DL (ref 0.76–1.27)
DEPRECATED RDW RBC AUTO: 49.3 FL (ref 37–54)
EOSINOPHIL # BLD AUTO: 0.12 10*3/MM3 (ref 0–0.4)
EOSINOPHIL NFR BLD AUTO: 1.8 % (ref 0.3–6.2)
ERYTHROCYTE [DISTWIDTH] IN BLOOD BY AUTOMATED COUNT: 16.3 % (ref 12.3–15.4)
GFR SERPL CREATININE-BSD FRML MDRD: 65 ML/MIN/1.73
GLUCOSE BLD-MCNC: 153 MG/DL (ref 65–99)
HCT VFR BLD AUTO: 42.7 % (ref 37.5–51)
HGB BLD-MCNC: 13.4 G/DL (ref 13–17.7)
IMM GRANULOCYTES # BLD AUTO: 0.03 10*3/MM3 (ref 0–0.05)
IMM GRANULOCYTES NFR BLD AUTO: 0.4 % (ref 0–0.5)
INR PPP: 1.24 (ref 0.9–1.1)
LYMPHOCYTES # BLD AUTO: 1.84 10*3/MM3 (ref 0.7–3.1)
LYMPHOCYTES NFR BLD AUTO: 27.1 % (ref 19.6–45.3)
MCH RBC QN AUTO: 26.1 PG (ref 26.6–33)
MCHC RBC AUTO-ENTMCNC: 31.4 G/DL (ref 31.5–35.7)
MCV RBC AUTO: 83.1 FL (ref 79–97)
MONOCYTES # BLD AUTO: 0.67 10*3/MM3 (ref 0.1–0.9)
MONOCYTES NFR BLD AUTO: 9.9 % (ref 5–12)
NEUTROPHILS # BLD AUTO: 4.08 10*3/MM3 (ref 1.7–7)
NEUTROPHILS NFR BLD AUTO: 60.1 % (ref 42.7–76)
NRBC BLD AUTO-RTO: 0 /100 WBC (ref 0–0.2)
PLATELET # BLD AUTO: 212 10*3/MM3 (ref 140–450)
PMV BLD AUTO: 11.1 FL (ref 6–12)
POTASSIUM BLD-SCNC: 4.3 MMOL/L (ref 3.5–5.2)
PROTHROMBIN TIME: 15.3 SECONDS (ref 11.7–14.2)
RBC # BLD AUTO: 5.14 10*6/MM3 (ref 4.14–5.8)
SODIUM BLD-SCNC: 138 MMOL/L (ref 136–145)
WBC NRBC COR # BLD: 6.79 10*3/MM3 (ref 3.4–10.8)

## 2020-03-27 PROCEDURE — 85610 PROTHROMBIN TIME: CPT | Performed by: INTERNAL MEDICINE

## 2020-03-27 PROCEDURE — 99214 OFFICE O/P EST MOD 30 MIN: CPT | Performed by: NURSE PRACTITIONER

## 2020-03-27 PROCEDURE — 85730 THROMBOPLASTIN TIME PARTIAL: CPT | Performed by: INTERNAL MEDICINE

## 2020-03-27 PROCEDURE — 36415 COLL VENOUS BLD VENIPUNCTURE: CPT | Performed by: INTERNAL MEDICINE

## 2020-03-27 PROCEDURE — 85025 COMPLETE CBC W/AUTO DIFF WBC: CPT | Performed by: INTERNAL MEDICINE

## 2020-03-27 PROCEDURE — 95251 CONT GLUC MNTR ANALYSIS I&R: CPT | Performed by: NURSE PRACTITIONER

## 2020-03-27 PROCEDURE — 80048 BASIC METABOLIC PNL TOTAL CA: CPT | Performed by: INTERNAL MEDICINE

## 2020-03-27 RX ORDER — ERGOCALCIFEROL 1.25 MG/1
CAPSULE ORAL
Qty: 12 CAPSULE | Refills: 1 | Status: SHIPPED | OUTPATIENT
Start: 2020-03-27 | End: 2020-10-22 | Stop reason: SDUPTHER

## 2020-03-27 RX ORDER — INSULIN LISPRO 100 [IU]/ML
INJECTION, SOLUTION INTRAVENOUS; SUBCUTANEOUS
Qty: 45 ML | Refills: 2 | Status: SHIPPED | OUTPATIENT
Start: 2020-03-27 | End: 2020-07-29

## 2020-03-27 RX ORDER — EMPAGLIFLOZIN 25 MG/1
1 TABLET, FILM COATED ORAL DAILY
Qty: 90 TABLET | Refills: 1 | Status: SHIPPED | OUTPATIENT
Start: 2020-03-27 | End: 2020-07-29

## 2020-03-27 RX ORDER — EZETIMIBE AND SIMVASTATIN 10; 40 MG/1; MG/1
TABLET ORAL
Qty: 90 TABLET | Refills: 1 | Status: SHIPPED | OUTPATIENT
Start: 2020-03-27 | End: 2020-07-29

## 2020-03-27 NOTE — PROGRESS NOTES
"Subjective   Noel Garcia is a 79 y.o. male is here today for follow-up.  Chief Complaint   Patient presents with   • Diabetes     recent labs Bg 3-4 x daily Pt has meter   • Hypertension   • Hypoglycemia   • Hyperlipidemia   • Vitamin D Deficiency   • Hypogonadism     /78 (BP Location: Left arm, Patient Position: Sitting, Cuff Size: Adult)   Ht 180.3 cm (70.98\")   Wt 113 kg (250 lb)   BMI 34.88 kg/m²   Current Outpatient Medications on File Prior to Visit   Medication Sig   • ACCU-CHEK FASTCLIX LANCETS misc CHECK BLOOD SUGAR 3 TIMES  DAILY   • albuterol sulfate  (90 Base) MCG/ACT inhaler Inhale 1-2 puffs.   • Continuous Blood Gluc Sensor (SquarespaceSTYLE KANIKA 14 DAY SENSOR) misc 1 each Every 14 (Fourteen) Days.   • ELIQUIS 5 MG tablet tablet TAKE 1 TABLET BY MOUTH  EVERY 12 HOURS   • ergocalciferol (ERGOCALCIFEROL) 1.25 MG (47292 UT) capsule Take 1 capsule twice weekly (Patient taking differently: 50,000 Units 2 (Two) Times a Week. Take 1 capsule twice weekly)   • ezetimibe-simvastatin (VYTORIN) 10-40 MG per tablet TAKE 1 TABLET BY MOUTH  DAILY   • furosemide (LASIX) 40 MG tablet Take 1 tablet by mouth Daily. (Patient taking differently: Take 40 mg by mouth As Needed.)   • glucose blood (ACCU-CHEK ROSETTA PLUS) test strip 1 each by Other route 3 (Three) Times a Day. as directed   • HUMALOG KWIKPEN 100 UNIT/ML solution pen-injector INJECT 15 UNITS  SUBCUTANEOUSLY PRIOR TO  EACH MEAL   • Insulin Glargine, 2 Unit Dial, (TOUJEO) 300 UNIT/ML solution pen-injector injection Inject 140 Units under the skin into the appropriate area as directed Daily.   • Insulin Pen Needle (B-D UF III MINI PEN NEEDLES) 31G X 5 MM misc USE 4 TIMES DAILY FOR INSULIN INJECTION   • JARDIANCE 25 MG tablet Take 25 mg by mouth Daily.   • linagliptin (TRADJENTA) 5 MG tablet tablet Take 1 tablet by mouth Daily.   • metFORMIN (GLUCOPHAGE) 500 MG tablet Take 2 tablets by mouth 2 (Two) Times a Day With Meals.   • omeprazole (priLOSEC) 20 " MG capsule TAKE 1 CAPSULE BY MOUTH  DAILY   • potassium chloride (MICRO-K) 10 MEQ CR capsule Take 10 mEq by mouth 2 (Two) Times a Day.   • tiotropium bromide-olodaterol (STIOLTO RESPIMAT) 2.5-2.5 MCG/ACT aerosol solution inhaler Inhale 2 puffs Daily.   • aspirin 325 MG tablet Take 1 tablet by mouth daily.   • O2 (OXYGEN) Inhale 2.5 L/min Every Night.     No current facility-administered medications on file prior to visit.      Family History   Problem Relation Age of Onset   • Hypertension Father    • Coronary artery disease Father    • Diabetes Father    • Hyperlipidemia Father    • Coronary artery disease Brother      Social History     Tobacco Use   • Smoking status: Former Smoker     Types: Cigarettes   • Smokeless tobacco: Never Used   Substance Use Topics   • Alcohol use: No   • Drug use: No     Allergies   Allergen Reactions   • Gemfibrozil Unknown (See Comments)     Patient doesn't remember    • Atorvastatin Rash         History of Present Illness   Encounter Diagnoses   Name Primary?   • Benign essential hypertension Yes   • Type 2 diabetes mellitus with hyperglycemia, unspecified whether long term insulin use (CMS/[facility])    • Elevated lipids    • Uncontrolled type 2 diabetes mellitus with hyperglycemia (CMS/[facility])    • Vitamin D deficiency      79-year-old male patient here today for follow-up visit.  He has been seen for the above-mentioned problems.  He is on a FreeStyle judith.  He has been seen for the above diagnoses.  His lowest blood sugar has been 63 according to his sensor download.  His average blood glucose is 195.  His time in target is 47%.  He does have high blood sugars in the 1  range 24% of the time.  He has had very high blood sugars greater than 2 5027% of the time.  He has had low blood sugars between 54 and 69 2% of the time.  He does have symptoms if he has low blood sugars.  We discussed how to treat low blood sugars less than 70 mg/dL.  We discussed increasing his mealtime insulin  based on his glycemic patterns.  Starting from 9 AM when he wakes up he starts having high blood sugars that extend throughout the day until he goes to bed at night.  His blood sugars correct typically overnight.  His basal insulin is 140 units once daily.  He is currently taking Humalog 15 units once daily.  He has orange juice to treat for hypoglycemia.  He is requesting medication refills on his current medications.  His medication list was reviewed and updated.  He had recent labs which were reviewed and he was provided a copy.  The following portions of the patient's history were reviewed and updated as appropriate: allergies, current medications, past family history, past medical history, past social history, past surgical history and problem list.    Review of Systems   Constitutional: Positive for fatigue. Negative for appetite change.   Eyes: Negative for visual disturbance.   Respiratory: Negative for cough.    Cardiovascular: Negative for leg swelling.   Gastrointestinal: Negative for constipation and diarrhea.   Endocrine: Negative for cold intolerance, heat intolerance, polydipsia, polyphagia and polyuria.   Genitourinary: Negative for frequency.   Neurological: Negative for numbness.       Objective   Physical Exam   Constitutional: He is oriented to person, place, and time. He appears well-developed and well-nourished. No distress.   HENT:   Head: Normocephalic and atraumatic.   Right Ear: External ear normal.   Left Ear: External ear normal.   Nose: Nose normal.   Eyes: Pupils are equal, round, and reactive to light. Right eye exhibits no discharge. Left eye exhibits no discharge.   Neck: Normal range of motion. Neck supple. Carotid bruit is not present. No tracheal deviation, no edema and no erythema present. No thyromegaly present.   Cardiovascular: Normal rate, regular rhythm and intact distal pulses. Exam reveals no gallop and no friction rub.   No murmur heard.  Pulmonary/Chest: Breath sounds  normal. No respiratory distress. He has no wheezes. He has no rales.   Abdominal: Soft. Bowel sounds are normal. He exhibits no distension. There is no tenderness.   Musculoskeletal: Normal range of motion. He exhibits edema. He exhibits no deformity.   1+ edema bilateral lower extremities   Lymphadenopathy:     He has no cervical adenopathy.   Neurological: He is alert and oriented to person, place, and time. Coordination normal.   Skin: Skin is warm and dry. No rash noted. He is not diaphoretic. No erythema. No pallor.   Psychiatric: He has a normal mood and affect. His behavior is normal. Judgment and thought content normal.   Nursing note and vitals reviewed.    Results for orders placed or performed in visit on 03/13/20   Comprehensive Metabolic Panel   Result Value Ref Range    Glucose 161 (H) 65 - 99 mg/dL    BUN 18 8 - 23 mg/dL    Creatinine 1.18 0.76 - 1.27 mg/dL    eGFR Non African Am 59 (L) >60 mL/min/1.73    eGFR African Am 72 >60 mL/min/1.73    BUN/Creatinine Ratio 15.3 7.0 - 25.0    Sodium 138 136 - 145 mmol/L    Potassium 4.5 3.5 - 5.2 mmol/L    Chloride 98 98 - 107 mmol/L    Total CO2 28.3 22.0 - 29.0 mmol/L    Calcium 9.2 8.6 - 10.5 mg/dL    Total Protein 6.1 6.0 - 8.5 g/dL    Albumin 3.80 3.50 - 5.20 g/dL    Globulin 2.3 gm/dL    A/G Ratio 1.7 g/dL    Total Bilirubin 0.3 0.2 - 1.2 mg/dL    Alkaline Phosphatase 61 39 - 117 U/L    AST (SGOT) 10 1 - 40 U/L    ALT (SGPT) 19 1 - 41 U/L   Lipid Panel   Result Value Ref Range    Total Cholesterol 111 0 - 200 mg/dL    Triglycerides 133 0 - 150 mg/dL    HDL Cholesterol 29 (L) 40 - 60 mg/dL    VLDL Cholesterol 26.6 mg/dL    LDL Cholesterol  55 0 - 100 mg/dL   T4 & TSH ([facility])   Result Value Ref Range    TSH 2.440 0.270 - 4.200 uIU/mL    T4, Total 6.08 4.50 - 11.70 mcg/dL   Hemoglobin A1c   Result Value Ref Range    Hemoglobin A1C 10.00 (H) 4.80 - 5.60 %   C-Peptide   Result Value Ref Range    C-Peptide 2.3 1.1 - 4.4 ng/mL   Vitamin D 25 Hydroxy   Result  Value Ref Range    25 Hydroxy, Vitamin D 54.9 30.0 - 100.0 ng/ml   Uric Acid   Result Value Ref Range    Uric Acid 6.5 3.4 - 7.0 mg/dL   Cardiovascular Risk Assessment   Result Value Ref Range    Interpretation Note    Diabetes Patient Education   Result Value Ref Range    PDF Image Not applicable          Assessment/Plan   Problems Addressed this Visit        Cardiovascular and Mediastinum    Benign essential hypertension - Primary       Digestive    Vitamin D deficiency       Endocrine    Diabetes mellitus (CMS/HCC)    Uncontrolled type 2 diabetes mellitus (CMS/[facility])       Other    Elevated lipids          In summary patient was seen and examined.  Metabolically and clinically he presents stable.  His labs were reviewed.  His medications were refilled per his request.  His hemoglobin A1c reflects uncontrolled type 2 diabetes with an A1c of 10.  Have increased his Humalog to 18 units once daily.  He is to monitor his blood sugars closely and if they continue to stay high I have asked that he contact the office.  He is active on my chart.  He states his wife knows how to use my chart and he has been advised that he can direct message me any complaints or concerns he might have.  His cholesterol is well controlled.  Vitamin D is stable.  Blood pressures in satisfactory range.  He will follow-up in 6 months with labs.  He has been encouraged to reach out to the office should he have any questions or concerns prior to his next visit.

## 2020-03-27 NOTE — PATIENT INSTRUCTIONS
Contact office if you have any low blood sugar less than 70  Increase mealtime insulin humalog to 18 units with meals    Hypoglycemia  Hypoglycemia is when the sugar (glucose) level in your blood is too low. Signs of low blood sugar may include:  · Feeling:  ? Hungry.  ? Worried or nervous (anxious).  ? Sweaty and clammy.  ? Confused.  ? Dizzy.  ? Sleepy.  ? Sick to your stomach (nauseous).  · Having:  ? A fast heartbeat.  ? A headache.  ? A change in your vision.  ? Tingling or no feeling (numbness) around your mouth, lips, or tongue.  ? Jerky movements that you cannot control (seizure).  · Having trouble with:  ? Moving (coordination).  ? Sleeping.  ? Passing out (fainting).  ? Getting upset easily (irritability).  Low blood sugar can happen to people who have diabetes and people who do not have diabetes. Low blood sugar can happen quickly, and it can be an emergency.  Treating low blood sugar  Low blood sugar is often treated by eating or drinking something sugary right away, such as:  · Fruit juice, 4-6 oz (120-150 mL).  · Regular soda (not diet soda), 4-6 oz (120-150 mL).  · Low-fat milk, 4 oz (120 mL).  · Several pieces of hard candy.  · Sugar or honey, 1 Tbsp (15 mL).  Treating low blood sugar if you have diabetes  If you can think clearly and swallow safely, follow the 15:15 rule:  · Take 15 grams of a fast-acting carb (carbohydrate). Talk with your doctor about how much you should take.  · Always keep a source of fast-acting carb with you, such as:  ? Sugar tablets (glucose pills). Take 3-4 pills.  ? 6-8 pieces of hard candy.  ? 4-6 oz (120-150 mL) of fruit juice.  ? 4-6 oz (120-150 mL) of regular (not diet) soda.  ? 1 Tbsp (15 mL) honey or sugar.  · Check your blood sugar 15 minutes after you take the carb.  · If your blood sugar is still at or below 70 mg/dL (3.9 mmol/L), take 15 grams of a carb again.  · If your blood sugar does not go above 70 mg/dL (3.9 mmol/L) after 3 tries, get help right  away.  · After your blood sugar goes back to normal, eat a meal or a snack within 1 hour.    Treating very low blood sugar  If your blood sugar is at or below 54 mg/dL (3 mmol/L), you have very low blood sugar (severe hypoglycemia). This may also cause:  · Passing out.  · Jerky movements you cannot control (seizure).  · Losing consciousness (coma).  This is an emergency. Do not wait to see if the symptoms will go away. Get medical help right away. Call your local emergency services (911 in the [place] Do not drive yourself to the hospital.  If you have very low blood sugar and you cannot eat or drink, you may need a glucagon shot (injection). A family member or friend should learn how to check your blood sugar and how to give you a glucagon shot. Ask your doctor if you need to have a glucagon shot kit at home.  Follow these instructions at home:  General instructions  · Take over-the-counter and prescription medicines only as told by your doctor.  · Stay aware of your blood sugar as told by your doctor.  · Limit alcohol intake to no more than 1 drink a day for nonpregnant women and 2 drinks a day for men. One drink equals 12 oz of beer (355 mL), 5 oz of wine (148 mL), or 1½ oz of hard liquor (44 mL).  · Keep all follow-up visits as told by your doctor. This is important.  If you have diabetes:    · Follow your diabetes care plan as told by your doctor. Make sure you:  ? Know the signs of low blood sugar.  ? Take your medicines as told.  ? Follow your exercise and meal plan.  ? Eat on time. Do not skip meals.  ? Check your blood sugar as often as told by your doctor. Always check it before and after exercise.  ? Follow your sick day plan when you cannot eat or drink normally. Make this plan ahead of time with your doctor.  · Share your diabetes care plan with:  ? Your work or school.  ? People you live with.  · Check your pee (urine) for ketones:  ? When you are sick.  ? As told by your doctor.  · Carry a card or wear  selwyn that says you have diabetes.  Contact a doctor if:  · You have trouble keeping your blood sugar in your target range.  · You have low blood sugar often.  Get help right away if:  · You still have symptoms after you eat or drink something sugary.  · Your blood sugar is at or below 54 mg/dL (3 mmol/L).  · You have jerky movements that you cannot control.  · You pass out.  These symptoms may be an emergency. Do not wait to see if the symptoms will go away. Get medical help right away. Call your local emergency services (911 in the [place] Do not drive yourself to the hospital.  Summary  · Hypoglycemia happens when the level of sugar (glucose) in your blood is too low.  · Low blood sugar can happen to people who have diabetes and people who do not have diabetes. Low blood sugar can happen quickly, and it can be an emergency.  · Make sure you know the signs of low blood sugar and know how to treat it.  · Always keep a source of sugar (fast-acting carb) with you to treat low blood sugar.  This information is not intended to replace advice given to you by your health care provider. Make sure you discuss any questions you have with your health care provider.  Document Released: 03/14/2011 Document Revised: 06/11/2019 Document Reviewed: 01/20/2017  [employer] Interactive Patient Education © 2020 [employer]

## 2020-04-01 ENCOUNTER — HOSPITAL ENCOUNTER (OUTPATIENT)
Facility: HOSPITAL | Age: 80
Setting detail: HOSPITAL OUTPATIENT SURGERY
Discharge: HOME | End: 2020-04-01
Attending: INTERNAL MEDICINE | Admitting: INTERNAL MEDICINE

## 2020-04-01 VITALS
DIASTOLIC BLOOD PRESSURE: 72 MMHG | OXYGEN SATURATION: 92 % | SYSTOLIC BLOOD PRESSURE: 144 MMHG | TEMPERATURE: 98.8 F | HEIGHT: 71 IN | WEIGHT: 250 LBS | HEART RATE: 69 BPM | BODY MASS INDEX: 35 KG/M2 | RESPIRATION RATE: 16 BRPM

## 2020-04-01 DIAGNOSIS — E55.9 VITAMIN D DEFICIENCY: ICD-10-CM

## 2020-04-01 DIAGNOSIS — R94.30 ABNORMAL CARDIAC FUNCTION TEST: ICD-10-CM

## 2020-04-01 DIAGNOSIS — E29.1 HYPOGONADISM IN MALE: ICD-10-CM

## 2020-04-01 DIAGNOSIS — E78.5 HYPERLIPIDEMIA, UNSPECIFIED HYPERLIPIDEMIA TYPE: ICD-10-CM

## 2020-04-01 DIAGNOSIS — R80.9 MICROALBUMINURIA: ICD-10-CM

## 2020-04-01 DIAGNOSIS — I10 BENIGN ESSENTIAL HYPERTENSION: ICD-10-CM

## 2020-04-01 LAB
GLUCOSE BLDC GLUCOMTR-MCNC: 124 MG/DL (ref 70–130)
GLUCOSE BLDC GLUCOMTR-MCNC: 173 MG/DL (ref 70–130)

## 2020-04-01 PROCEDURE — 99152 MOD SED SAME PHYS/QHP 5/>YRS: CPT | Performed by: INTERNAL MEDICINE

## 2020-04-01 PROCEDURE — 25010000002 HEPARIN (PORCINE) PER 1000 UNITS: Performed by: INTERNAL MEDICINE

## 2020-04-01 PROCEDURE — C1894 INTRO/SHEATH, NON-LASER: HCPCS | Performed by: INTERNAL MEDICINE

## 2020-04-01 PROCEDURE — 0 IOPAMIDOL PER 1 ML: Performed by: INTERNAL MEDICINE

## 2020-04-01 PROCEDURE — 25010000002 FENTANYL CITRATE (PF) 100 MCG/2ML SOLUTION: Performed by: INTERNAL MEDICINE

## 2020-04-01 PROCEDURE — 82962 GLUCOSE BLOOD TEST: CPT

## 2020-04-01 PROCEDURE — 93458 L HRT ARTERY/VENTRICLE ANGIO: CPT | Performed by: INTERNAL MEDICINE

## 2020-04-01 PROCEDURE — C1769 GUIDE WIRE: HCPCS | Performed by: INTERNAL MEDICINE

## 2020-04-01 PROCEDURE — 25010000002 MIDAZOLAM PER 1 MG: Performed by: INTERNAL MEDICINE

## 2020-04-01 RX ORDER — ACETAMINOPHEN 325 MG/1
650 TABLET ORAL EVERY 4 HOURS PRN
Status: CANCELLED | OUTPATIENT
Start: 2020-04-01

## 2020-04-01 RX ORDER — CEPHALEXIN 500 MG/1
500 CAPSULE ORAL 3 TIMES DAILY
Qty: 15 CAPSULE | Refills: 0 | Status: SHIPPED | OUTPATIENT
Start: 2020-04-01 | End: 2020-04-01

## 2020-04-01 RX ORDER — SODIUM CHLORIDE 9 MG/ML
75 INJECTION, SOLUTION INTRAVENOUS CONTINUOUS
Status: DISCONTINUED | OUTPATIENT
Start: 2020-04-01 | End: 2020-04-01 | Stop reason: HOSPADM

## 2020-04-01 RX ORDER — FENTANYL CITRATE 50 UG/ML
INJECTION, SOLUTION INTRAMUSCULAR; INTRAVENOUS AS NEEDED
Status: DISCONTINUED | OUTPATIENT
Start: 2020-04-01 | End: 2020-04-01 | Stop reason: HOSPADM

## 2020-04-01 RX ORDER — SODIUM CHLORIDE 0.9 % (FLUSH) 0.9 %
3 SYRINGE (ML) INJECTION EVERY 12 HOURS SCHEDULED
Status: DISCONTINUED | OUTPATIENT
Start: 2020-04-01 | End: 2020-04-01 | Stop reason: HOSPADM

## 2020-04-01 RX ORDER — LIDOCAINE HYDROCHLORIDE 20 MG/ML
INJECTION, SOLUTION INFILTRATION; PERINEURAL AS NEEDED
Status: DISCONTINUED | OUTPATIENT
Start: 2020-04-01 | End: 2020-04-01 | Stop reason: HOSPADM

## 2020-04-01 RX ORDER — MIDAZOLAM HYDROCHLORIDE 1 MG/ML
INJECTION INTRAMUSCULAR; INTRAVENOUS AS NEEDED
Status: DISCONTINUED | OUTPATIENT
Start: 2020-04-01 | End: 2020-04-01 | Stop reason: HOSPADM

## 2020-04-01 RX ORDER — LIDOCAINE HYDROCHLORIDE 10 MG/ML
0.1 INJECTION, SOLUTION EPIDURAL; INFILTRATION; INTRACAUDAL; PERINEURAL ONCE AS NEEDED
Status: DISCONTINUED | OUTPATIENT
Start: 2020-04-01 | End: 2020-04-01 | Stop reason: HOSPADM

## 2020-04-01 RX ORDER — SODIUM CHLORIDE 0.9 % (FLUSH) 0.9 %
10 SYRINGE (ML) INJECTION AS NEEDED
Status: DISCONTINUED | OUTPATIENT
Start: 2020-04-01 | End: 2020-04-01 | Stop reason: HOSPADM

## 2020-04-01 RX ADMIN — SODIUM CHLORIDE 75 ML/HR: 9 INJECTION, SOLUTION INTRAVENOUS at 08:12

## 2020-04-01 NOTE — DISCHARGE INSTRUCTIONS
[facility]  [address] [place] [address]    Coronary Angiogram (Radial/Ulnar Approach) After Care    Refer to this sheet in the next few weeks. These instructions provide you with information on caring for yourself after your procedure. Your caregiver may also give you more specific instructions. Your treatment has been planned according to current medical practices, but problems sometimes occur. Call your caregiver if you have any problems or questions after your procedure.    Home Care Instructions:  · You may shower the day after the procedure. Remove the bandage (dressing) and gently wash the site with plain soap and water. Gently pat the site dry. You may apply a band aid daily for 2 days if desired.    · Do not apply powder or lotion to the site.  · Do not submerge the affected site in water for 3 to 5 days or until the site is completely healed.   · Do not lift, push or pull anything over 10 pounds for 2 days after your procedure.  · Inspect the site at least twice daily. You may notice some bruising at the site and it may be tender for 1 to 2 weeks.     · Increase your fluid intake for the next 2 days.    · Keep arm elevated for 24 hours. For the remainder of the day, keep your arm in “Pledge of Allegiance” position when up and about.     · You may drive 24 hours after the procedure unless otherwise instructed by your caregiver.  · Do not operate machinery or power tools for 24 hours.  · A responsible adult should be with you for the first 24 hours after you arrive home. Do not make any important legal decisions or sign legal papers for 24 hours.  Do not drink alcohol for 24 hours.    · Metformin or any medications containing Metformin should not be taken for 48 hours after your procedure.    Restart Metformin on Friday 4/3 at noon.    Call Your Doctor if:   · You have unusual pain at the radial/ulnar (wrist) site.  · You have redness, warmth, swelling, or pain at the radial/ulnar  (wrist) site.  · You have drainage (other than a small amount of blood on the dressing).  · You have chills or a fever > 101.  · Your arm becomes pale or dark, cool, tingly, or numb.  · You have heavy bleeding from the site, hold pressure on the site for 20 minutes.  If the bleeding stops, apply a fresh bandage and call your cardiologist.  However, if you continue to have bleeding, call 911.

## 2020-04-01 NOTE — PERIOPERATIVE NURSING NOTE
Pt belongings retrieved from [facility]. TAINA instructions reviewed with Wife, Evelyn, via telephone. Evelyn verbalized understanding.

## 2020-04-06 RX ORDER — APIXABAN 5 MG/1
TABLET, FILM COATED ORAL
Qty: 180 TABLET | Refills: 0 | Status: SHIPPED | OUTPATIENT
Start: 2020-04-06 | End: 2020-09-14

## 2020-04-14 NOTE — PROGRESS NOTES
Subjective:        Noel Garcia is a 79 y.o. male who here for follow up    No chief complaint on file.    He is having a telephone visit status post cardiac cath.     HPI     Noel Garcia is an 80 yo male, who is current with this provider. He has a history of essential hypertension, benign benign prostatic hyperplasia, diabetes mellitus, GERD, paroxysmal atrial fibrillation on Eliquis, and hyperlipidemia. He is via telephone status post cardiac catheterization, which revealed LAD early atherosclerotic plaque including the diagonal and  branches. Circumflex arteries are codominant with early atherosclerotic plaque. RCA codominant with PDA 80% stenosis.  Recommendation were if symptoms continue angioplasty would be considered. His cardiac catheterization site per patient shows no s/s of infection or hematoma. He states his site has no s/s of infection or hematoma noted.    Echo on 2/5/20 revealed mild TV regurgitation, RVC is mildly dilated, LV dysfunction consistent with impaired relaxation, EF 54%. Stress test on 2/5/20 indicated a small to medium sized, mild to moderately sever area of ischemia located in the apex and inferior wall.     Denies chest pain, shortness of breath, and syncope.     The following portions of the patient's history were reviewed and updated as appropriate: allergies, current medications, past family history, past medical history, past social history, past surgical history and problem list.    Past Medical History:   Diagnosis Date   • Benign essential hypertension    • Benign prostatic hyperplasia 1/21/2016   • Diabetes mellitus (CMS/HCC)    • Gastroesophageal reflux disease 1/21/2016   • GERD (gastroesophageal reflux disease)    • Hamartoma of lung (CMS/HCC)    • Hyperlipidemia    • Microalbuminuria    • Obesity    • Prostatic hyperplasia    • Type 2 diabetes mellitus (CMS/HCC)    • Vitamin D deficiency          reports that he has quit smoking. His smoking use included  cigarettes. He has never used smokeless tobacco. He reports that he does not drink alcohol or use drugs.     Family History   Problem Relation Age of Onset   • Hypertension Father    • Coronary artery disease Father    • Diabetes Father    • Hyperlipidemia Father    • Coronary artery disease Brother        ROS     Review of Systems  Constitutional: No wt loss, fever, fatigue  Gastrointestinal: No nausea, abdominal pain  Behavioral/Psych: No insomnia or anxiety  Cardiovascular: Denies shortness of breath, chest pain, and syncope.       Objective:         This is a telephone visit, unable to do PE.  Physical Exam    Procedures           Conclusion        · Successful right and left coronary angiogram and LV gram  · Normal left main  · Left anterior descending early atherosclerotic plaque including the diagonal and  branches  · Circumflex arteries are codominant with early atherosclerotic plaque  · Right coronary artery codominant with PDA 80% stenosis  · Normal LV gram           Interpretation Summary        · Findings consistent with an equivocal ECG stress test.  · Left ventricular ejection fraction is normal (Calculated EF = 60%).  · Myocardial perfusion imaging indicates a small-to-medium-sized, mild-to-moderately severe area of ischemia located in the apex and inferior wall.  · Impressions are consistent with an intermediate risk study.        Interpretation Summary     · Mild tricuspid valve regurgitation is present.  · Right ventricular cavity is mildly dilated.  · Left ventricular diastolic dysfunction (grade I a) consistent with impaired relaxation.  · Calculated EF = 54%.  · There is no evidence of pericardial effusion.           Current Outpatient Medications:   •  ACCU-CHEK FASTCLIX LANCETS misc, CHECK BLOOD SUGAR 3 TIMES  DAILY, Disp: 306 each, Rfl: 0  •  albuterol sulfate  (90 Base) MCG/ACT inhaler, Inhale 1-2 puffs., Disp: , Rfl:   •  aspirin 325 MG tablet, Take 1 tablet by mouth  daily., Disp: , Rfl:   •  Continuous Blood Gluc Sensor (FREESTYLE KANIKA 14 DAY SENSOR) [facility] [place] 1 each Every 14 (Fourteen) Days., Disp: 6 each, Rfl: 1  •  ELIQUIS 5 MG tablet tablet, TAKE 1 TABLET BY MOUTH  EVERY 12 HOURS, Disp: 180 tablet, Rfl: 0  •  ergocalciferol (ERGOCALCIFEROL) 1.25 MG (54211 UT) capsule, Take 1 capsule weekly, Disp: 12 capsule, Rfl: 1  •  ezetimibe-simvastatin (VYTORIN) 10-40 MG per tablet, TAKE 1 TABLET BY MOUTH  DAILY (Patient taking differently: Take 1 tablet by mouth Every Night. TAKE 1 TABLET BY MOUTH  DAILY), Disp: 90 tablet, Rfl: 1  •  furosemide (LASIX) 40 MG tablet, Take 1 tablet by mouth Daily. (Patient taking differently: Take 40 mg by mouth 2 (Two) Times a Day.), Disp: 90 tablet, Rfl: 1  •  glucose blood (ACCU-CHEK ROSETTA PLUS) test strip, 1 each by Other route 3 (Three) Times a Day. as directed, Disp: 300 each, Rfl: 1  •  HUMALOG KWIKPEN 100 UNIT/ML solution pen-injector, INJECT 18 UNITS  SUBCUTANEOUSLY PRIOR TO  EACH MEAL, Disp: 45 mL, Rfl: 2  •  Insulin Glargine, 2 Unit Dial, (TOUJEO) 300 UNIT/ML solution pen-injector injection, Inject 140 Units under the skin into the appropriate area as directed Daily., Disp: 54 mL, Rfl: 1  •  Insulin Pen Needle (B-D UF III MINI PEN NEEDLES) 31G X 5 MM misc, USE 4 TIMES DAILY FOR INSULIN INJECTION, Disp: 400 each, Rfl: 1  •  JARDIANCE 25 MG tablet, Take 25 mg by mouth Daily., Disp: 90 tablet, Rfl: 1  •  linagliptin (Tradjenta) 5 MG tablet tablet, Take 1 tablet by mouth Daily., Disp: 90 tablet, Rfl: 1  •  metFORMIN (GLUCOPHAGE) 500 MG tablet, Take 2 tablets by mouth 2 (Two) Times a Day With Meals., Disp: 360 tablet, Rfl: 1  •  O2 (OXYGEN), Inhale 2.5 L/min Every Night., Disp: , Rfl:   •  omeprazole (priLOSEC) 20 MG capsule, TAKE 1 CAPSULE BY MOUTH  DAILY (Patient taking differently: Take 20 mg by mouth Daily.), Disp: 90 capsule, Rfl: 0  •  potassium chloride (MICRO-K) 10 MEQ CR capsule, Take 10 mEq by mouth 2 (Two) Times a Day., Disp: , Rfl:   •   tiotropium bromide-olodaterol (STIOLTO RESPIMAT) 2.5-2.5 MCG/ACT aerosol solution inhaler, Inhale 2 puffs Daily., Disp: , Rfl:      Assessment:        Patient Active Problem List   Diagnosis   • Vitamin D deficiency   • Benign essential hypertension   • Diabetes mellitus (CMS/HCC)   • Gastroesophageal reflux disease   • Hyperlipidemia   • Hypogonadism in male   • Microalbuminuria   • Adiposity   • Benign prostatic hyperplasia   • Uncontrolled type 2 diabetes mellitus (CMS/HCC)   • Cerebrovascular accident (CMS/HCC)   • Elevated lipids   • Hamartoma (CMS/HCC)   • Hypertension   • Malignant melanoma (CMS/HCC)   • Paroxysmal atrial fibrillation (CMS/HCC)   • Anticoagulation adequate   • CHF (congestive heart failure) (CMS/HCC)   • Abnormal cardiac function test               Plan:   1. CAD status post cardiac cath:  Revealed LAD with early atherosclerotic plaque including the diagonal and  branches, circumflex arteries are codominant with early atherosclerotic plaque. RCA codominant with PDA 80% stenosis. Recommendations are to do angioplasty if symptoms continue. Continue statin. Will add BB.      Risk reduction for the coronary artery disease, controlling the blood pressure, blood sugar management, cholesterol management, exercise, stress management, and proper compliance with medications and follow-up has been discussed.    2. Essential hypertension: His blood pressure and HR stable. Will add metoprolol succinate 12.5. I have asked the patient to take his blood pressure daily and to monitor for low blood pressure. He is to call the office if his blood pressure is not stable.      Educated patient on exercising for at least 30 minutes a day for 2 to 3 days a week. Importance of controlling hypertension and blood pressure checkup on the regular basis has been explained. Hypertension as a silent killer has been discussed. Risk reduction of the weight and regular exercises to control the hypertension has been  explained.      3. Hyperlipidemia: His last lipid profile on 10/11/19 revealed , HDL 26, LDL 45, and trig. 158. Continue Statin intolerant. Diet controlled.    Risk of the hyperlipidemia, importance of the treatment has been explained. Pros and cons of the statins has been explained. Regular blood workup as well as side effects including the liver failure, myelopathy death has been explained.     3. Paroxymal atrial fibrillation: On Eliquis. No bleeding noted.     Pros and cons as well as indication of the anticoagulation has been explained to the patient in detail. There are no obvious complications at this stage. Risk of  the bleedings has been explained. Need for the regular blood workup and adjust the dose has been explained. Need for proper follow-up on anticoagulation also has been explained.          No diagnosis found.    There are no diagnoses linked to this encounter.    COUNSELING:    Noel Scruggs was given to patient for the following topics: diagnostic results, risk factor reductions, impressions, risks and benefits of treatment options and importance of treatment compliance .       SMOKING COUNSELING: Denies      This was a telephone visit. He will follow up in 6 months, unless he needs to be seen sooner.          Sincerely,   EMILY David  [facility]   04/15/20   [id number]     This patient has consented to a telehealth visit via telephone. The visit was scheduled as a telephone visit to comply with patient safety concerns in accordance with CDC recommendations.  All vitals recorded within this visit are reported by the patient.  I spent  30 minutes in total including but not limited to the 15 minutes spent in direct conversation with this patient.

## 2020-04-15 ENCOUNTER — OFFICE VISIT (OUTPATIENT)
Dept: CARDIOLOGY | Facility: CLINIC | Age: 80
End: 2020-04-15

## 2020-04-15 VITALS — WEIGHT: 250 LBS | HEIGHT: 71 IN | BODY MASS INDEX: 35 KG/M2

## 2020-04-15 DIAGNOSIS — I10 HYPERTENSION, ESSENTIAL: ICD-10-CM

## 2020-04-15 DIAGNOSIS — I48.0 PAROXYSMAL ATRIAL FIBRILLATION (HCC): ICD-10-CM

## 2020-04-15 DIAGNOSIS — Z98.890 STATUS POST CARDIAC CATHETERIZATION: Primary | ICD-10-CM

## 2020-04-15 DIAGNOSIS — E78.5 HYPERLIPIDEMIA, UNSPECIFIED HYPERLIPIDEMIA TYPE: ICD-10-CM

## 2020-04-15 PROCEDURE — 99442 PR PHYS/QHP TELEPHONE EVALUATION 11-20 MIN: CPT | Performed by: NURSE PRACTITIONER

## 2020-04-15 RX ORDER — FUROSEMIDE 40 MG/1
40 TABLET ORAL DAILY
Qty: 90 TABLET | Refills: 1 | Status: CANCELLED | OUTPATIENT
Start: 2020-04-15

## 2020-04-15 RX ORDER — METOPROLOL SUCCINATE 25 MG/1
12.5 TABLET, EXTENDED RELEASE ORAL DAILY
Qty: 15 TABLET | Refills: 3 | Status: SHIPPED | OUTPATIENT
Start: 2020-04-15 | End: 2020-08-11

## 2020-04-17 ENCOUNTER — TELEPHONE (OUTPATIENT)
Dept: ENDOCRINOLOGY | Age: 80
End: 2020-04-17

## 2020-05-28 DIAGNOSIS — R80.9 MICROALBUMINURIA: ICD-10-CM

## 2020-05-28 DIAGNOSIS — E55.9 VITAMIN D DEFICIENCY: ICD-10-CM

## 2020-05-28 DIAGNOSIS — I10 BENIGN ESSENTIAL HYPERTENSION: ICD-10-CM

## 2020-05-28 DIAGNOSIS — E29.1 HYPOGONADISM IN MALE: ICD-10-CM

## 2020-05-28 DIAGNOSIS — N40.0 BENIGN NON-NODULAR PROSTATIC HYPERPLASIA WITHOUT LOWER URINARY TRACT SYMPTOMS: ICD-10-CM

## 2020-05-28 DIAGNOSIS — E66.9 OBESITY, UNSPECIFIED CLASSIFICATION, UNSPECIFIED OBESITY TYPE, UNSPECIFIED WHETHER SERIOUS COMORBIDITY PRESENT: ICD-10-CM

## 2020-05-28 DIAGNOSIS — E11.65 TYPE 2 DIABETES MELLITUS WITH HYPERGLYCEMIA (HCC): ICD-10-CM

## 2020-05-28 DIAGNOSIS — E78.5 HYPERLIPIDEMIA, UNSPECIFIED HYPERLIPIDEMIA TYPE: ICD-10-CM

## 2020-06-12 RX ORDER — POTASSIUM CHLORIDE 750 MG/1
10 CAPSULE, EXTENDED RELEASE ORAL DAILY
Qty: 180 CAPSULE | Refills: 1 | Status: SHIPPED | OUTPATIENT
Start: 2020-06-12

## 2020-07-29 DIAGNOSIS — E78.5 HYPERLIPIDEMIA, UNSPECIFIED HYPERLIPIDEMIA TYPE: ICD-10-CM

## 2020-07-29 DIAGNOSIS — E29.1 HYPOGONADISM IN MALE: ICD-10-CM

## 2020-07-29 DIAGNOSIS — E55.9 VITAMIN D DEFICIENCY: ICD-10-CM

## 2020-07-29 DIAGNOSIS — I10 BENIGN ESSENTIAL HYPERTENSION: ICD-10-CM

## 2020-07-29 DIAGNOSIS — R80.9 MICROALBUMINURIA: ICD-10-CM

## 2020-07-29 RX ORDER — INSULIN LISPRO 100 [IU]/ML
INJECTION, SOLUTION INTRAVENOUS; SUBCUTANEOUS
Qty: 60 ML | Refills: 0 | Status: SHIPPED | OUTPATIENT
Start: 2020-07-29 | End: 2020-10-22 | Stop reason: SDUPTHER

## 2020-07-29 RX ORDER — LINAGLIPTIN 5 MG/1
TABLET, FILM COATED ORAL
Qty: 90 TABLET | Refills: 0 | Status: SHIPPED | OUTPATIENT
Start: 2020-07-29 | End: 2020-01-01

## 2020-07-29 RX ORDER — EMPAGLIFLOZIN 25 MG/1
TABLET, FILM COATED ORAL
Qty: 90 TABLET | Refills: 0 | Status: SHIPPED | OUTPATIENT
Start: 2020-07-29 | End: 2020-10-22 | Stop reason: SDUPTHER

## 2020-07-29 RX ORDER — EZETIMIBE AND SIMVASTATIN 10; 40 MG/1; MG/1
TABLET ORAL
Qty: 90 TABLET | Refills: 0 | Status: SHIPPED | OUTPATIENT
Start: 2020-07-29 | End: 2020-10-22 | Stop reason: SDUPTHER

## 2020-08-11 RX ORDER — METOPROLOL SUCCINATE 25 MG/1
TABLET, EXTENDED RELEASE ORAL
Qty: 15 TABLET | Refills: 3 | Status: SHIPPED | OUTPATIENT
Start: 2020-08-11 | End: 2020-09-14 | Stop reason: SDUPTHER

## 2020-09-14 RX ORDER — METOPROLOL SUCCINATE 25 MG/1
12.5 TABLET, EXTENDED RELEASE ORAL DAILY
Qty: 45 TABLET | Refills: 1 | Status: SHIPPED | OUTPATIENT
Start: 2020-09-14 | End: 2021-01-01

## 2020-09-14 RX ORDER — APIXABAN 5 MG/1
TABLET, FILM COATED ORAL
Qty: 180 TABLET | Refills: 1 | Status: SHIPPED | OUTPATIENT
Start: 2020-09-14 | End: 2020-01-01 | Stop reason: SDUPTHER

## 2020-10-01 ENCOUNTER — OFFICE VISIT (OUTPATIENT)
Dept: CARDIOLOGY | Facility: CLINIC | Age: 80
End: 2020-10-01

## 2020-10-01 VITALS
SYSTOLIC BLOOD PRESSURE: 148 MMHG | BODY MASS INDEX: 34.72 KG/M2 | WEIGHT: 248 LBS | HEART RATE: 82 BPM | HEIGHT: 71 IN | DIASTOLIC BLOOD PRESSURE: 76 MMHG

## 2020-10-01 DIAGNOSIS — I25.10 CORONARY ARTERY DISEASE INVOLVING NATIVE CORONARY ARTERY OF NATIVE HEART WITHOUT ANGINA PECTORIS: Primary | ICD-10-CM

## 2020-10-01 DIAGNOSIS — I10 ESSENTIAL HYPERTENSION: ICD-10-CM

## 2020-10-01 DIAGNOSIS — E78.5 HYPERLIPIDEMIA, UNSPECIFIED HYPERLIPIDEMIA TYPE: ICD-10-CM

## 2020-10-01 PROCEDURE — 99213 OFFICE O/P EST LOW 20 MIN: CPT | Performed by: INTERNAL MEDICINE

## 2020-10-01 RX ORDER — OMEPRAZOLE 20 MG/1
20 CAPSULE, DELAYED RELEASE ORAL DAILY
COMMUNITY
Start: 2020-08-18 | End: 2020-10-22 | Stop reason: SDUPTHER

## 2020-10-01 RX ORDER — ASPIRIN 81 MG/1
81 TABLET, CHEWABLE ORAL DAILY
COMMUNITY
End: 2021-01-01

## 2020-10-03 DIAGNOSIS — E55.9 VITAMIN D DEFICIENCY: ICD-10-CM

## 2020-10-03 DIAGNOSIS — E29.1 HYPOGONADISM IN MALE: ICD-10-CM

## 2020-10-03 DIAGNOSIS — R80.9 MICROALBUMINURIA: ICD-10-CM

## 2020-10-03 DIAGNOSIS — I10 BENIGN ESSENTIAL HYPERTENSION: ICD-10-CM

## 2020-10-03 DIAGNOSIS — E78.5 HYPERLIPIDEMIA, UNSPECIFIED HYPERLIPIDEMIA TYPE: ICD-10-CM

## 2020-10-07 DIAGNOSIS — E78.5 HYPERLIPIDEMIA, UNSPECIFIED HYPERLIPIDEMIA TYPE: ICD-10-CM

## 2020-10-07 DIAGNOSIS — I10 BENIGN ESSENTIAL HYPERTENSION: Primary | ICD-10-CM

## 2020-10-07 DIAGNOSIS — E29.1 HYPOGONADISM IN MALE: ICD-10-CM

## 2020-10-07 DIAGNOSIS — N40.0 BENIGN PROSTATIC HYPERPLASIA WITHOUT LOWER URINARY TRACT SYMPTOMS: ICD-10-CM

## 2020-10-07 DIAGNOSIS — E55.9 VITAMIN D DEFICIENCY: ICD-10-CM

## 2020-10-07 DIAGNOSIS — E11.65 TYPE 2 DIABETES MELLITUS WITH HYPERGLYCEMIA, UNSPECIFIED WHETHER LONG TERM INSULIN USE (HCC): ICD-10-CM

## 2020-10-08 ENCOUNTER — LAB (OUTPATIENT)
Dept: ENDOCRINOLOGY | Age: 80
End: 2020-10-08

## 2020-10-08 DIAGNOSIS — N40.0 BENIGN PROSTATIC HYPERPLASIA WITHOUT LOWER URINARY TRACT SYMPTOMS: ICD-10-CM

## 2020-10-08 DIAGNOSIS — E29.1 HYPOGONADISM IN MALE: ICD-10-CM

## 2020-10-08 DIAGNOSIS — E11.65 TYPE 2 DIABETES MELLITUS WITH HYPERGLYCEMIA, UNSPECIFIED WHETHER LONG TERM INSULIN USE (HCC): ICD-10-CM

## 2020-10-08 DIAGNOSIS — E78.5 HYPERLIPIDEMIA, UNSPECIFIED HYPERLIPIDEMIA TYPE: ICD-10-CM

## 2020-10-08 DIAGNOSIS — I10 BENIGN ESSENTIAL HYPERTENSION: ICD-10-CM

## 2020-10-08 DIAGNOSIS — E55.9 VITAMIN D DEFICIENCY: ICD-10-CM

## 2020-10-09 DIAGNOSIS — E78.5 HYPERLIPIDEMIA, UNSPECIFIED HYPERLIPIDEMIA TYPE: ICD-10-CM

## 2020-10-09 DIAGNOSIS — E11.65 TYPE 2 DIABETES MELLITUS WITH HYPERGLYCEMIA (HCC): ICD-10-CM

## 2020-10-09 DIAGNOSIS — E29.1 HYPOGONADISM IN MALE: ICD-10-CM

## 2020-10-09 DIAGNOSIS — N40.0 BENIGN NON-NODULAR PROSTATIC HYPERPLASIA WITHOUT LOWER URINARY TRACT SYMPTOMS: ICD-10-CM

## 2020-10-09 DIAGNOSIS — I10 BENIGN ESSENTIAL HYPERTENSION: ICD-10-CM

## 2020-10-09 DIAGNOSIS — E55.9 VITAMIN D DEFICIENCY: ICD-10-CM

## 2020-10-09 DIAGNOSIS — E66.9 OBESITY, UNSPECIFIED CLASSIFICATION, UNSPECIFIED OBESITY TYPE, UNSPECIFIED WHETHER SERIOUS COMORBIDITY PRESENT: ICD-10-CM

## 2020-10-09 DIAGNOSIS — R80.9 MICROALBUMINURIA: ICD-10-CM

## 2020-10-10 LAB
25(OH)D3+25(OH)D2 SERPL-MCNC: 63.4 NG/ML (ref 30–100)
ALBUMIN SERPL-MCNC: 4.1 G/DL (ref 3.5–5.2)
ALBUMIN/GLOB SERPL: 2.1 G/DL
ALP SERPL-CCNC: 70 U/L (ref 39–117)
ALT SERPL-CCNC: 17 U/L (ref 1–41)
AST SERPL-CCNC: 13 U/L (ref 1–40)
BILIRUB SERPL-MCNC: 0.3 MG/DL (ref 0–1.2)
BUN SERPL-MCNC: 14 MG/DL (ref 8–23)
BUN/CREAT SERPL: 13.1 (ref 7–25)
C PEPTIDE SERPL-MCNC: 2.2 NG/ML (ref 1.1–4.4)
CALCIUM SERPL-MCNC: 8.7 MG/DL (ref 8.6–10.5)
CHLORIDE SERPL-SCNC: 103 MMOL/L (ref 98–107)
CHOLEST SERPL-MCNC: 109 MG/DL (ref 0–200)
CO2 SERPL-SCNC: 27.6 MMOL/L (ref 22–29)
CREAT SERPL-MCNC: 1.07 MG/DL (ref 0.76–1.27)
FT4I SERPL CALC-MCNC: 1.8 (ref 1.2–4.9)
GLOBULIN SER CALC-MCNC: 2 GM/DL
GLUCOSE SERPL-MCNC: 203 MG/DL (ref 65–99)
HBA1C MFR BLD: 9.6 % (ref 4.8–5.6)
HDLC SERPL-MCNC: 26 MG/DL (ref 40–60)
INTERPRETATION: NORMAL
LDLC SERPL CALC-MCNC: 53 MG/DL (ref 0–100)
Lab: NORMAL
MICROALBUMIN UR-MCNC: 4.2 UG/ML
POTASSIUM SERPL-SCNC: 4.4 MMOL/L (ref 3.5–5.2)
PROT SERPL-MCNC: 6.1 G/DL (ref 6–8.5)
SHBG SERPL-SCNC: 19.2 NMOL/L (ref 19.3–76.4)
SODIUM SERPL-SCNC: 140 MMOL/L (ref 136–145)
T3RU NFR SERPL: 28 % (ref 24–39)
T4 SERPL-MCNC: 6.4 UG/DL (ref 4.5–12)
TESTOST FREE SERPL-MCNC: 2.5 PG/ML (ref 6.6–18.1)
TESTOST SERPL-MCNC: 80 NG/DL (ref 264–916)
TRIGL SERPL-MCNC: 177 MG/DL (ref 0–150)
TSH SERPL DL<=0.005 MIU/L-ACNC: 2.15 UIU/ML (ref 0.45–4.5)
VLDLC SERPL CALC-MCNC: 30 MG/DL (ref 5–40)

## 2020-10-12 RX ORDER — FLURBIPROFEN SODIUM 0.3 MG/ML
SOLUTION/ DROPS OPHTHALMIC
Qty: 360 EACH | Refills: 0 | Status: SHIPPED | OUTPATIENT
Start: 2020-10-12 | End: 2020-10-22 | Stop reason: SDUPTHER

## 2020-10-22 ENCOUNTER — OFFICE VISIT (OUTPATIENT)
Dept: ENDOCRINOLOGY | Age: 80
End: 2020-10-22

## 2020-10-22 VITALS
SYSTOLIC BLOOD PRESSURE: 130 MMHG | HEIGHT: 72 IN | BODY MASS INDEX: 33.59 KG/M2 | WEIGHT: 248 LBS | DIASTOLIC BLOOD PRESSURE: 68 MMHG

## 2020-10-22 DIAGNOSIS — E11.65 UNCONTROLLED TYPE 2 DIABETES MELLITUS WITH HYPERGLYCEMIA (HCC): Primary | ICD-10-CM

## 2020-10-22 DIAGNOSIS — E66.9 OBESITY, UNSPECIFIED CLASSIFICATION, UNSPECIFIED OBESITY TYPE, UNSPECIFIED WHETHER SERIOUS COMORBIDITY PRESENT: ICD-10-CM

## 2020-10-22 DIAGNOSIS — N40.0 BENIGN NON-NODULAR PROSTATIC HYPERPLASIA WITHOUT LOWER URINARY TRACT SYMPTOMS: ICD-10-CM

## 2020-10-22 DIAGNOSIS — E29.1 HYPOGONADISM IN MALE: ICD-10-CM

## 2020-10-22 DIAGNOSIS — E78.5 HYPERLIPIDEMIA, UNSPECIFIED HYPERLIPIDEMIA TYPE: ICD-10-CM

## 2020-10-22 DIAGNOSIS — E55.9 VITAMIN D DEFICIENCY: ICD-10-CM

## 2020-10-22 DIAGNOSIS — R80.9 MICROALBUMINURIA: ICD-10-CM

## 2020-10-22 DIAGNOSIS — I10 BENIGN ESSENTIAL HYPERTENSION: ICD-10-CM

## 2020-10-22 DIAGNOSIS — E11.65 TYPE 2 DIABETES MELLITUS WITH HYPERGLYCEMIA (HCC): ICD-10-CM

## 2020-10-22 PROCEDURE — 99214 OFFICE O/P EST MOD 30 MIN: CPT | Performed by: NURSE PRACTITIONER

## 2020-10-22 PROCEDURE — 95251 CONT GLUC MNTR ANALYSIS I&R: CPT | Performed by: NURSE PRACTITIONER

## 2020-10-22 RX ORDER — LANCETS
EACH MISCELLANEOUS
Qty: 306 EACH | Refills: 1 | Status: SHIPPED | OUTPATIENT
Start: 2020-10-22

## 2020-10-22 RX ORDER — EMPAGLIFLOZIN 25 MG/1
1 TABLET, FILM COATED ORAL DAILY
Qty: 90 TABLET | Refills: 1 | Status: SHIPPED | OUTPATIENT
Start: 2020-10-22 | End: 2021-01-01

## 2020-10-22 RX ORDER — BLOOD SUGAR DIAGNOSTIC
1 STRIP MISCELLANEOUS 3 TIMES DAILY
Qty: 300 EACH | Refills: 1 | Status: SHIPPED | OUTPATIENT
Start: 2020-10-22

## 2020-10-22 RX ORDER — INSULIN LISPRO 100 [IU]/ML
INJECTION, SOLUTION INTRAVENOUS; SUBCUTANEOUS
Qty: 60 ML | Refills: 1 | Status: SHIPPED | OUTPATIENT
Start: 2020-10-22 | End: 2020-01-01 | Stop reason: SDUPTHER

## 2020-10-22 RX ORDER — OMEPRAZOLE 20 MG/1
20 CAPSULE, DELAYED RELEASE ORAL DAILY
Qty: 90 CAPSULE | Refills: 1 | Status: SHIPPED | OUTPATIENT
Start: 2020-10-22 | End: 2021-01-01 | Stop reason: SDUPTHER

## 2020-10-22 RX ORDER — FLURBIPROFEN SODIUM 0.3 MG/ML
SOLUTION/ DROPS OPHTHALMIC
Qty: 360 EACH | Refills: 1 | Status: SHIPPED | OUTPATIENT
Start: 2020-10-22 | End: 2021-01-01 | Stop reason: SDUPTHER

## 2020-10-22 RX ORDER — FLASH GLUCOSE SENSOR
1 KIT MISCELLANEOUS
Qty: 6 EACH | Refills: 1 | Status: SHIPPED | OUTPATIENT
Start: 2020-10-22 | End: 2021-01-01 | Stop reason: SDUPTHER

## 2020-10-22 RX ORDER — ERGOCALCIFEROL 1.25 MG/1
CAPSULE ORAL
Qty: 12 CAPSULE | Refills: 1 | Status: SHIPPED | OUTPATIENT
Start: 2020-10-22 | End: 2021-01-01 | Stop reason: SDUPTHER

## 2020-10-22 RX ORDER — EZETIMIBE AND SIMVASTATIN 10; 40 MG/1; MG/1
1 TABLET ORAL DAILY
Qty: 90 TABLET | Refills: 1 | Status: SHIPPED | OUTPATIENT
Start: 2020-10-22 | End: 2021-01-01 | Stop reason: SDUPTHER

## 2020-10-22 NOTE — PATIENT INSTRUCTIONS
Increase dinnertime insulin from 18 units to 20 units  Continue all other medications as prescribed  Scan judith sensor every 8 hours

## 2020-10-22 NOTE — PROGRESS NOTES
"Subjective   Noel Garcia is a 80 y.o. male is here today for follow-up.  Chief Complaint   Patient presents with   • Diabetes     F/U type 2 diabetes, recent labs, checks BS 2-3x a day, eye exam 2020     /68   Ht 182.9 cm (72\")   Wt 112 kg (248 lb)   BMI 33.63 kg/m²   Current Outpatient Medications on File Prior to Visit   Medication Sig   • ACCU-CHEK FASTCLIX LANCETS misc CHECK BLOOD SUGAR 3 TIMES  DAILY   • albuterol sulfate  (90 Base) MCG/ACT inhaler Inhale 1-2 puffs.   • aspirin 81 MG chewable tablet Chew 81 mg Daily.   • Continuous Blood Gluc Sensor (FREESTYLE KANIKA 14 DAY SENSOR) misc 1 each Every 14 (Fourteen) Days.   • Eliquis 5 MG tablet tablet TAKE 1 TABLET BY MOUTH  EVERY 12 HOURS   • ergocalciferol (ERGOCALCIFEROL) 1.25 MG (65874 UT) capsule Take 1 capsule weekly   • ezetimibe-simvastatin (VYTORIN) 10-40 MG per tablet TAKE 1 TABLET BY MOUTH  DAILY   • furosemide (LASIX) 40 MG tablet Take 1 tablet by mouth Daily.   • glucose blood (ACCU-CHEK ROSETTA PLUS) test strip 1 each by Other route 3 (Three) Times a Day. as directed   • HUMALOG KWIKPEN 100 UNIT/ML solution pen-injector INJECT 18 UNITS  SUBCUTANEOUSLY PRIOR TO  EACH MEAL   • Insulin Glargine, 2 Unit Dial, (TOUJEO) 300 UNIT/ML solution pen-injector injection Inject 140 Units under the skin into the appropriate area as directed Daily.   • Insulin Pen Needle (B-D UF III MINI PEN NEEDLES) 31G X 5 MM misc USE 4 TIMES DAILY FOR  INSULIN INJECTION   • JARDIANCE 25 MG tablet TAKE 1 TABLET BY MOUTH  DAILY   • metFORMIN (GLUCOPHAGE) 500 MG tablet TAKE 2 TABLETS BY MOUTH  TWICE DAILY WITH MEALS   • metoprolol succinate XL (TOPROL-XL) 25 MG 24 hr tablet Take 0.5 tablets by mouth Daily.   • omeprazole (priLOSEC) 20 MG capsule TAKE 1 CAPSULE BY MOUTH  DAILY (Patient taking differently: Take 20 mg by mouth Daily.)   • potassium chloride (MICRO-K) 10 MEQ CR capsule Take 1 capsule by mouth Daily.   • tiotropium bromide-olodaterol (STIOLTO RESPIMAT) " 2.5-2.5 MCG/ACT aerosol solution inhaler Inhale 2 puffs Daily.   • TRADJENTA 5 MG tablet tablet TAKE 1 TABLET BY MOUTH  DAILY   • [DISCONTINUED] omeprazole (priLOSEC) 20 MG capsule Take 20 mg by mouth Daily.     No current facility-administered medications on file prior to visit.      Family History   Problem Relation Age of Onset   • Hypertension Father    • Coronary artery disease Father    • Diabetes Father    • Hyperlipidemia Father    • Coronary artery disease Brother      Social History     Tobacco Use   • Smoking status: Former Smoker     Types: Cigarettes   • Smokeless tobacco: Never Used   Substance Use Topics   • Alcohol use: No   • Drug use: No     Allergies   Allergen Reactions   • Gemfibrozil Unknown (See Comments)     Patient doesn't remember    • Atorvastatin Rash         History of Present Illness   Encounter Diagnoses   Name Primary?   • Uncontrolled type 2 diabetes mellitus with hyperglycemia (CMS/[facility]) Yes   • Hypogonadism in male    • Benign essential hypertension    • Vitamin D deficiency    • Hyperlipidemia, unspecified hyperlipidemia type      Patient is a 81 y/o male being seen for uncontrolled T2DM, HTN, hypogonadism, HLD, and Vitamin D deficiency. His labs were reviewed and he was provided a copy. His medication list was reviewed for accuracy. Patient's Shidonni judith report was downloaded and reflects October 9, 2020 through October 22, 2020 for a period of 14 days. His average glucose during this time period was 195 mg/dL. During this time he spent 52% of time in target range which is 70-180mg/dL, 22% of his time high at 181-250mg/dL and 26% of his time very high at >250mg/dL. HIs GMI was 8.0% and hsi glucose variability was 41.1%. Patient was educated on scanning the sensor every 8 hours to reflect consistent data. Takes 18 units homolog w/ meals. 140 toujeo. No longer taking testosterone. Frequently urination.  We discussed increasing his dinnertime insulin to 20units from 18units.   Patient denies lightheadedness, dizziness, syncope.  He denies numbness and tingling in his feet.  He is current on his foot exam.  His feet were observed no and were only significant for dryness.  Patient has had an annual eye exam but he could not remember the provider's name.  He stated that he would provider send us a copy of their assessment.  Patient reports increased urination    The following portions of the patient's history were reviewed and updated as appropriate: allergies, current medications, past family history, past medical history, past social history, past surgical history and problem list.    Review of Systems   Cardiovascular: Negative.    Gastrointestinal: Negative.    Endocrine: Negative.    Neurological: Negative.    Psychiatric/Behavioral: Negative for sleep disturbance.       Objective   Physical Exam  Vitals signs and nursing note reviewed.   Constitutional:       General: He is not in acute distress.     Appearance: He is well-developed. He is not diaphoretic.   HENT:      Head: Normocephalic and atraumatic.      Right Ear: External ear normal.      Left Ear: External ear normal.      Nose: Nose normal.   Eyes:      General:         Right eye: No discharge.         Left eye: No discharge.      Pupils: Pupils are equal, round, and reactive to light.   Neck:      Musculoskeletal: Normal range of motion and neck supple. No edema or erythema.      Thyroid: No thyromegaly.      Vascular: No carotid bruit.      Trachea: No tracheal deviation.   Cardiovascular:      Rate and Rhythm: Normal rate and regular rhythm.      Heart sounds: Normal heart sounds. No murmur. No friction rub. No gallop.    Pulmonary:      Effort: Pulmonary effort is normal. No respiratory distress.      Breath sounds: Normal breath sounds. No wheezing or rales.   Abdominal:      General: Bowel sounds are normal. There is no distension.      Palpations: Abdomen is soft.      Tenderness: There is no abdominal tenderness.    Musculoskeletal: Normal range of motion.         General: No deformity.   Feet:      Right foot:      Skin integrity: Dry skin present.      Toenail Condition: Right toenails are normal.      Left foot:      Skin integrity: Dry skin present.      Toenail Condition: Left toenails are normal.   Lymphadenopathy:      Cervical: No cervical adenopathy.   Skin:     General: Skin is warm and dry.      Coloration: Skin is not pale.      Findings: No erythema or rash.   Neurological:      Mental Status: He is alert and oriented to person, place, and time.      Coordination: Coordination normal.   Psychiatric:         Behavior: Behavior normal.         Thought Content: Thought content normal.         Judgment: Judgment normal.       Results for orders placed or performed in visit on 10/08/20   TestT+TestF+SHBG    Specimen: Blood   Result Value Ref Range    Testosterone, Total 80 (L) 264 - 916 ng/dL    Testosterone, Free 2.5 (L) 6.6 - 18.1 pg/mL    Sex Hormone Binding Globulin 19.2 (L) 19.3 - 76.4 nmol/L   Thyroid Panel With TSH    Specimen: Blood   Result Value Ref Range    TSH 2.150 0.450 - 4.500 uIU/mL    T4, Total 6.4 4.5 - 12.0 ug/dL    T3 Uptake 28 24 - 39 %    Free Thyroxine Index 1.8 1.2 - 4.9   C-Peptide    Specimen: Blood   Result Value Ref Range    C-Peptide 2.2 1.1 - 4.4 ng/mL   Hemoglobin A1c    Specimen: Blood   Result Value Ref Range    Hemoglobin A1C 9.60 (H) 4.80 - 5.60 %   Vitamin D 25 Hydroxy    Specimen: Blood   Result Value Ref Range    25 Hydroxy, Vitamin D 63.4 30.0 - 100.0 ng/ml   MicroAlbumin, Urine, Random - Urine, Clean Catch    Specimen: Urine, Clean Catch   Result Value Ref Range    Microalbumin, Urine 4.2 Not Estab. ug/mL   Lipid Panel    Specimen: Blood   Result Value Ref Range    Total Cholesterol 109 0 - 200 mg/dL    Triglycerides 177 (H) 0 - 150 mg/dL    HDL Cholesterol 26 (L) 40 - 60 mg/dL    VLDL Cholesterol Bret 30 5 - 40 mg/dL    LDL Chol Calc (NIH) 53 0 - 100 mg/dL   Comprehensive  Metabolic Panel    Specimen: Blood   Result Value Ref Range    Glucose 203 (H) 65 - 99 mg/dL    BUN 14 8 - 23 mg/dL    Creatinine 1.07 0.76 - 1.27 mg/dL    eGFR Non African Am 66 >60 mL/min/1.73    eGFR African Am 80 >60 mL/min/1.73    BUN/Creatinine Ratio 13.1 7.0 - 25.0    Sodium 140 136 - 145 mmol/L    Potassium 4.4 3.5 - 5.2 mmol/L    Chloride 103 98 - 107 mmol/L    Total CO2 27.6 22.0 - 29.0 mmol/L    Calcium 8.7 8.6 - 10.5 mg/dL    Total Protein 6.1 6.0 - 8.5 g/dL    Albumin 4.10 3.50 - 5.20 g/dL    Globulin 2.0 gm/dL    A/G Ratio 2.1 g/dL    Total Bilirubin 0.3 0.0 - 1.2 mg/dL    Alkaline Phosphatase 70 39 - 117 U/L    AST (SGOT) 13 1 - 40 U/L    ALT (SGPT) 17 1 - 41 U/L   Cardiovascular Risk Assessment   Result Value Ref Range    Interpretation Note    Diabetes Patient Education   Result Value Ref Range    PDF Image Not applicable          Assessment/Plan   Problems Addressed this Visit        Cardiovascular and Mediastinum    Benign essential hypertension    Hyperlipidemia       Digestive    Vitamin D deficiency       Endocrine    Uncontrolled type 2 diabetes mellitus (CMS/HCC) - Primary    RESOLVED: Hypogonadism in male      Diagnoses       Codes Comments    Uncontrolled type 2 diabetes mellitus with hyperglycemia (CMS/HCC)    -  Primary ICD-10-CM: E11.65  ICD-9-CM: 250.02     Hypogonadism in male     ICD-10-CM: E29.1  ICD-9-CM: 257.2     Benign essential hypertension     ICD-10-CM: I10  ICD-9-CM: 401.1     Vitamin D deficiency     ICD-10-CM: E55.9  ICD-9-CM: 268.9     Hyperlipidemia, unspecified hyperlipidemia type     ICD-10-CM: E78.5  ICD-9-CM: 272.4         In summary patient was evaluated and physically examined for follow-up appointment for hypertension, hyperlipidemia, vitamin D deficiency, uncontrolled type 2 diabetes.  Medication list was reviewed for accuracy.  His labs reviewed and he was provided a copy.  States that he generally feels well.  Controlled type 2 diabetes: Patient's A1c is 9.6  which is above goal.  Patient's Freestyle judith report was downloaded and reflects October 9, 2020 through October 22, 2020 for a period of 14 days. His average glucose during this time period was 195 mg/dL. During this time he spent 52% of time in target range which is 70-180mg/dL, 22% of his time high at 181-250mg/dL and 26% of his time very high at >250mg/dL. HIs GMI was 8.0% and hsi glucose variability was 41.1%. Patient was educated on scanning the sensor every 8 hours to reflect consistent data.  Glucose above range values tend to be peaking at night following his dinnertime meal.  We discussed increasing his dinnertime insulin from 18 units to 20 units.  He is to keep taking his Toujeo 140 units daily.  He is to continue taking 18 units with his lunch and breakfast.  Patient denies numbness or tingling in his extremities.  Patient denies lightheadedness, dizziness, and syncope.  Hypertension: Patient's blood pressure is currently at 130/68 and is stable.  Vitamin D deficiency: Patient's vitamin D level was at goal.  He currently takes 50,000 unit ergocalciferol once weekly.  He was encouraged to continue taking his prescribed.  Hyperlipidemia: Patient's cholesterol labs are at goal.  Currently takes Vytorin.  Dr. Mccrary had previously prescribed and omeprazole.  Patient was seeking refill for omeprazole and was granted a 90-day prescription with 1 refill.  However, patient was instructed to have this prescription transferred to his primary care physician or cardiologist.  Patient is agreeable to the treatment plan was instructed to schedule follow-up appointment for 6 months with labs prior to the visit.  He was encouraged to return to our office if he has any questions or concerns prior to this visit.

## 2021-01-01 ENCOUNTER — TELEPHONE (OUTPATIENT)
Dept: ENDOCRINOLOGY | Age: 81
End: 2021-01-01

## 2021-01-01 ENCOUNTER — MEDICATION THERAPY MANAGEMENT (OUTPATIENT)
Dept: ENDOCRINOLOGY | Age: 81
End: 2021-01-01

## 2021-01-01 ENCOUNTER — OFFICE VISIT (OUTPATIENT)
Dept: ENDOCRINOLOGY | Age: 81
End: 2021-01-01

## 2021-01-01 ENCOUNTER — TRANSCRIBE ORDERS (OUTPATIENT)
Dept: PREADMISSION TESTING | Facility: HOSPITAL | Age: 81
End: 2021-01-01

## 2021-01-01 ENCOUNTER — OFFICE VISIT (OUTPATIENT)
Dept: CARDIOLOGY | Facility: CLINIC | Age: 81
End: 2021-01-01

## 2021-01-01 ENCOUNTER — HOSPITAL ENCOUNTER (OUTPATIENT)
Dept: GENERAL RADIOLOGY | Facility: HOSPITAL | Age: 81
Discharge: HOME | End: 2021-10-11

## 2021-01-01 ENCOUNTER — HOSPITAL ENCOUNTER (OUTPATIENT)
Dept: CARDIOLOGY | Facility: HOSPITAL | Age: 81
Discharge: HOME | End: 2021-10-07

## 2021-01-01 ENCOUNTER — TELEPHONE (OUTPATIENT)
Dept: CARDIOLOGY | Facility: CLINIC | Age: 81
End: 2021-01-01

## 2021-01-01 ENCOUNTER — APPOINTMENT (OUTPATIENT)
Dept: PREADMISSION TESTING | Facility: HOSPITAL | Age: 81
End: 2021-01-01

## 2021-01-01 ENCOUNTER — PRE-ADMISSION TESTING (OUTPATIENT)
Dept: PREADMISSION TESTING | Facility: HOSPITAL | Age: 81
End: 2021-01-01

## 2021-01-01 ENCOUNTER — HOSPITAL ENCOUNTER (OUTPATIENT)
Dept: CARDIOLOGY | Facility: HOSPITAL | Age: 81
Discharge: HOME | End: 2021-10-05
Admitting: NURSE PRACTITIONER

## 2021-01-01 VITALS
HEIGHT: 72 IN | DIASTOLIC BLOOD PRESSURE: 80 MMHG | SYSTOLIC BLOOD PRESSURE: 140 MMHG | BODY MASS INDEX: 33.81 KG/M2 | OXYGEN SATURATION: 94 % | WEIGHT: 249.6 LBS | HEART RATE: 85 BPM

## 2021-01-01 VITALS
BODY MASS INDEX: 29.8 KG/M2 | DIASTOLIC BLOOD PRESSURE: 60 MMHG | RESPIRATION RATE: 18 BRPM | SYSTOLIC BLOOD PRESSURE: 144 MMHG | HEIGHT: 72 IN | HEART RATE: 79 BPM | WEIGHT: 220 LBS | OXYGEN SATURATION: 100 %

## 2021-01-01 VITALS
DIASTOLIC BLOOD PRESSURE: 82 MMHG | HEART RATE: 81 BPM | SYSTOLIC BLOOD PRESSURE: 167 MMHG | WEIGHT: 220 LBS | BODY MASS INDEX: 29.8 KG/M2 | HEIGHT: 72 IN

## 2021-01-01 VITALS
WEIGHT: 220 LBS | SYSTOLIC BLOOD PRESSURE: 152 MMHG | DIASTOLIC BLOOD PRESSURE: 70 MMHG | HEART RATE: 90 BPM | HEIGHT: 72 IN | BODY MASS INDEX: 29.8 KG/M2

## 2021-01-01 VITALS
WEIGHT: 220 LBS | HEART RATE: 90 BPM | SYSTOLIC BLOOD PRESSURE: 147 MMHG | BODY MASS INDEX: 29.8 KG/M2 | HEIGHT: 72 IN | DIASTOLIC BLOOD PRESSURE: 78 MMHG

## 2021-01-01 VITALS
HEART RATE: 87 BPM | WEIGHT: 253 LBS | DIASTOLIC BLOOD PRESSURE: 70 MMHG | HEIGHT: 72 IN | BODY MASS INDEX: 34.27 KG/M2 | SYSTOLIC BLOOD PRESSURE: 137 MMHG

## 2021-01-01 VITALS
DIASTOLIC BLOOD PRESSURE: 70 MMHG | HEART RATE: 86 BPM | SYSTOLIC BLOOD PRESSURE: 132 MMHG | WEIGHT: 219 LBS | TEMPERATURE: 97.2 F | HEIGHT: 71 IN | BODY MASS INDEX: 30.66 KG/M2 | OXYGEN SATURATION: 97 % | RESPIRATION RATE: 16 BRPM

## 2021-01-01 VITALS
WEIGHT: 225.1 LBS | DIASTOLIC BLOOD PRESSURE: 72 MMHG | SYSTOLIC BLOOD PRESSURE: 132 MMHG | HEIGHT: 72 IN | BODY MASS INDEX: 30.49 KG/M2

## 2021-01-01 DIAGNOSIS — E66.9 OBESITY, UNSPECIFIED CLASSIFICATION, UNSPECIFIED OBESITY TYPE, UNSPECIFIED WHETHER SERIOUS COMORBIDITY PRESENT: ICD-10-CM

## 2021-01-01 DIAGNOSIS — E11.65 TYPE 2 DIABETES MELLITUS WITH HYPERGLYCEMIA, WITH LONG-TERM CURRENT USE OF INSULIN (HCC): ICD-10-CM

## 2021-01-01 DIAGNOSIS — Z79.4 TYPE 2 DIABETES MELLITUS WITH HYPERGLYCEMIA, WITH LONG-TERM CURRENT USE OF INSULIN (HCC): ICD-10-CM

## 2021-01-01 DIAGNOSIS — E78.2 MIXED HYPERLIPIDEMIA: ICD-10-CM

## 2021-01-01 DIAGNOSIS — E11.65 TYPE 2 DIABETES MELLITUS WITH HYPERGLYCEMIA (HCC): ICD-10-CM

## 2021-01-01 DIAGNOSIS — I50.9 CONGESTIVE HEART FAILURE, UNSPECIFIED HF CHRONICITY, UNSPECIFIED HEART FAILURE TYPE (HCC): ICD-10-CM

## 2021-01-01 DIAGNOSIS — Z79.4 TYPE 2 DIABETES MELLITUS WITH HYPERGLYCEMIA, WITH LONG-TERM CURRENT USE OF INSULIN (HCC): Primary | ICD-10-CM

## 2021-01-01 DIAGNOSIS — I10 BENIGN ESSENTIAL HYPERTENSION: ICD-10-CM

## 2021-01-01 DIAGNOSIS — I10 PRIMARY HYPERTENSION: ICD-10-CM

## 2021-01-01 DIAGNOSIS — I50.41 ACUTE COMBINED SYSTOLIC (CONGESTIVE) AND DIASTOLIC (CONGESTIVE) HEART FAILURE (HCC): ICD-10-CM

## 2021-01-01 DIAGNOSIS — E55.9 VITAMIN D DEFICIENCY: ICD-10-CM

## 2021-01-01 DIAGNOSIS — R80.9 MICROALBUMINURIA: ICD-10-CM

## 2021-01-01 DIAGNOSIS — E11.65 TYPE 2 DIABETES MELLITUS WITH HYPERGLYCEMIA, WITH LONG-TERM CURRENT USE OF INSULIN (HCC): Primary | ICD-10-CM

## 2021-01-01 DIAGNOSIS — Z01.818 PRE-OPERATIVE CLEARANCE: ICD-10-CM

## 2021-01-01 DIAGNOSIS — I48.0 PAROXYSMAL ATRIAL FIBRILLATION (HCC): ICD-10-CM

## 2021-01-01 DIAGNOSIS — I48.0 PAROXYSMAL ATRIAL FIBRILLATION (HCC): Primary | ICD-10-CM

## 2021-01-01 DIAGNOSIS — E78.5 HYPERLIPIDEMIA, UNSPECIFIED HYPERLIPIDEMIA TYPE: ICD-10-CM

## 2021-01-01 DIAGNOSIS — E11.65 UNCONTROLLED TYPE 2 DIABETES MELLITUS WITH HYPERGLYCEMIA (HCC): Primary | ICD-10-CM

## 2021-01-01 DIAGNOSIS — Z79.01 ANTICOAGULATED: ICD-10-CM

## 2021-01-01 DIAGNOSIS — I50.9 CONGESTIVE HEART FAILURE, UNSPECIFIED HF CHRONICITY, UNSPECIFIED HEART FAILURE TYPE (HCC): Primary | ICD-10-CM

## 2021-01-01 DIAGNOSIS — E11.65 TYPE 2 DIABETES MELLITUS WITH HYPERGLYCEMIA, UNSPECIFIED WHETHER LONG TERM INSULIN USE (HCC): Primary | ICD-10-CM

## 2021-01-01 DIAGNOSIS — N40.0 BENIGN NON-NODULAR PROSTATIC HYPERPLASIA WITHOUT LOWER URINARY TRACT SYMPTOMS: ICD-10-CM

## 2021-01-01 DIAGNOSIS — E29.1 HYPOGONADISM IN MALE: ICD-10-CM

## 2021-01-01 LAB
ALBUMIN SERPL-MCNC: 4.2 G/DL (ref 3.5–5.2)
ALBUMIN/CREAT UR: 10 MG/G CREAT (ref 0–29)
ALBUMIN/GLOB SERPL: 1.5 G/DL
ALP SERPL-CCNC: 86 U/L (ref 39–117)
ALT SERPL W P-5'-P-CCNC: 12 U/L (ref 1–41)
ANION GAP SERPL CALCULATED.3IONS-SCNC: 10.6 MMOL/L (ref 5–15)
AORTIC DIMENSIONLESS INDEX: 0.7 (DI)
APTT PPP: 26.2 SECONDS (ref 22.7–35.4)
AST SERPL-CCNC: 12 U/L (ref 1–40)
BACTERIA SPEC AEROBE CULT: ABNORMAL
BASOPHILS # BLD AUTO: 0.03 10*3/MM3 (ref 0–0.2)
BASOPHILS NFR BLD AUTO: 0.8 % (ref 0–1.5)
BH CV ECHO MEAS - ACS: 2.1 CM
BH CV ECHO MEAS - AO ARCH DIAM (PROXIMAL TRANS.): 2.2 CM
BH CV ECHO MEAS - AO MAX PG (FULL): 3.5 MMHG
BH CV ECHO MEAS - AO MAX PG: 6 MMHG
BH CV ECHO MEAS - AO MEAN PG (FULL): 1.6 MMHG
BH CV ECHO MEAS - AO MEAN PG: 2.9 MMHG
BH CV ECHO MEAS - AO ROOT AREA (BSA CORRECTED): 1.6
BH CV ECHO MEAS - AO ROOT AREA: 10.4 CM^2
BH CV ECHO MEAS - AO ROOT DIAM: 3.6 CM
BH CV ECHO MEAS - AO V2 MAX: 122.2 CM/SEC
BH CV ECHO MEAS - AO V2 MEAN: 77.3 CM/SEC
BH CV ECHO MEAS - AO V2 VTI: 22.2 CM
BH CV ECHO MEAS - AVA(I,A): 3.5 CM^2
BH CV ECHO MEAS - AVA(I,D): 3.5 CM^2
BH CV ECHO MEAS - AVA(V,A): 3.1 CM^2
BH CV ECHO MEAS - AVA(V,D): 3.1 CM^2
BH CV ECHO MEAS - BSA(HAYCOCK): 2.3 M^2
BH CV ECHO MEAS - BSA: 2.2 M^2
BH CV ECHO MEAS - BZI_BMI: 29.8 KILOGRAMS/M^2
BH CV ECHO MEAS - BZI_METRIC_HEIGHT: 182.9 CM
BH CV ECHO MEAS - BZI_METRIC_WEIGHT: 99.8 KG
BH CV ECHO MEAS - EDV(CUBED): 115 ML
BH CV ECHO MEAS - EDV(MOD-SP2): 71 ML
BH CV ECHO MEAS - EDV(MOD-SP4): 78 ML
BH CV ECHO MEAS - EDV(TEICH): 110.8 ML
BH CV ECHO MEAS - EF(CUBED): 83.8 %
BH CV ECHO MEAS - EF(MOD-BP): 56.9 %
BH CV ECHO MEAS - EF(MOD-SP2): 59.2 %
BH CV ECHO MEAS - EF(MOD-SP4): 55.1 %
BH CV ECHO MEAS - EF(TEICH): 76.7 %
BH CV ECHO MEAS - ESV(CUBED): 18.7 ML
BH CV ECHO MEAS - ESV(MOD-SP2): 29 ML
BH CV ECHO MEAS - ESV(MOD-SP4): 35 ML
BH CV ECHO MEAS - ESV(TEICH): 25.9 ML
BH CV ECHO MEAS - FS: 45.4 %
BH CV ECHO MEAS - IVS/LVPW: 0.91
BH CV ECHO MEAS - IVSD: 1.2 CM
BH CV ECHO MEAS - LAT PEAK E' VEL: 9.2 CM/SEC
BH CV ECHO MEAS - LV DIASTOLIC VOL/BSA (35-75): 35.2 ML/M^2
BH CV ECHO MEAS - LV MASS(C)D: 235.7 GRAMS
BH CV ECHO MEAS - LV MASS(C)DI: 106.3 GRAMS/M^2
BH CV ECHO MEAS - LV MAX PG: 2.5 MMHG
BH CV ECHO MEAS - LV MEAN PG: 1.3 MMHG
BH CV ECHO MEAS - LV SYSTOLIC VOL/BSA (12-30): 15.8 ML/M^2
BH CV ECHO MEAS - LV V1 MAX: 78.6 CM/SEC
BH CV ECHO MEAS - LV V1 MEAN: 52.2 CM/SEC
BH CV ECHO MEAS - LV V1 VTI: 16.4 CM
BH CV ECHO MEAS - LVIDD: 4.9 CM
BH CV ECHO MEAS - LVIDS: 2.7 CM
BH CV ECHO MEAS - LVLD AP2: 7.2 CM
BH CV ECHO MEAS - LVLD AP4: 7.3 CM
BH CV ECHO MEAS - LVLS AP2: 6.4 CM
BH CV ECHO MEAS - LVLS AP4: 6.2 CM
BH CV ECHO MEAS - LVOT AREA (M): 4.9 CM^2
BH CV ECHO MEAS - LVOT AREA: 4.7 CM^2
BH CV ECHO MEAS - LVOT DIAM: 2.5 CM
BH CV ECHO MEAS - LVPWD: 1.3 CM
BH CV ECHO MEAS - MED PEAK E' VEL: 6.9 CM/SEC
BH CV ECHO MEAS - MR MAX PG: 90.7 MMHG
BH CV ECHO MEAS - MR MAX VEL: 476.3 CM/SEC
BH CV ECHO MEAS - MV A DUR: 0.1 SEC
BH CV ECHO MEAS - MV A MAX VEL: 94.1 CM/SEC
BH CV ECHO MEAS - MV DEC SLOPE: 568.8 CM/SEC^2
BH CV ECHO MEAS - MV DEC TIME: 235 SEC
BH CV ECHO MEAS - MV E MAX VEL: 72.3 CM/SEC
BH CV ECHO MEAS - MV E/A: 0.77
BH CV ECHO MEAS - MV MAX PG: 4.1 MMHG
BH CV ECHO MEAS - MV MEAN PG: 1.9 MMHG
BH CV ECHO MEAS - MV P1/2T MAX VEL: 103.7 CM/SEC
BH CV ECHO MEAS - MV P1/2T: 53.4 MSEC
BH CV ECHO MEAS - MV V2 MAX: 101.2 CM/SEC
BH CV ECHO MEAS - MV V2 MEAN: 64.2 CM/SEC
BH CV ECHO MEAS - MV V2 VTI: 19 CM
BH CV ECHO MEAS - MVA P1/2T LCG: 2.1 CM^2
BH CV ECHO MEAS - MVA(P1/2T): 4.1 CM^2
BH CV ECHO MEAS - MVA(VTI): 4.1 CM^2
BH CV ECHO MEAS - PA ACC TIME: 0.06 SEC
BH CV ECHO MEAS - PA MAX PG (FULL): 2.2 MMHG
BH CV ECHO MEAS - PA MAX PG: 4.4 MMHG
BH CV ECHO MEAS - PA PR(ACCEL): 54 MMHG
BH CV ECHO MEAS - PA V2 MAX: 104.5 CM/SEC
BH CV ECHO MEAS - PI END-D VEL: 119.4 CM/SEC
BH CV ECHO MEAS - PULM A REVS DUR: 0.13 SEC
BH CV ECHO MEAS - PULM A REVS VEL: 36.5 CM/SEC
BH CV ECHO MEAS - PULM DIAS VEL: 46.7 CM/SEC
BH CV ECHO MEAS - PULM S/D: 0.9
BH CV ECHO MEAS - PULM SYS VEL: 42.1 CM/SEC
BH CV ECHO MEAS - RAP SYSTOLE: 3 MMHG
BH CV ECHO MEAS - RV MAX PG: 2.2 MMHG
BH CV ECHO MEAS - RV MEAN PG: 1.2 MMHG
BH CV ECHO MEAS - RV V1 MAX: 74.2 CM/SEC
BH CV ECHO MEAS - RV V1 MEAN: 51.5 CM/SEC
BH CV ECHO MEAS - RV V1 VTI: 15.2 CM
BH CV ECHO MEAS - RVSP: 29 MMHG
BH CV ECHO MEAS - SI(AO): 104.4 ML/M^2
BH CV ECHO MEAS - SI(CUBED): 43.4 ML/M^2
BH CV ECHO MEAS - SI(LVOT): 35.1 ML/M^2
BH CV ECHO MEAS - SI(MOD-SP2): 18.9 ML/M^2
BH CV ECHO MEAS - SI(MOD-SP4): 19.4 ML/M^2
BH CV ECHO MEAS - SI(TEICH): 38.3 ML/M^2
BH CV ECHO MEAS - SV(AO): 231.7 ML
BH CV ECHO MEAS - SV(CUBED): 96.3 ML
BH CV ECHO MEAS - SV(LVOT): 77.9 ML
BH CV ECHO MEAS - SV(MOD-SP2): 42 ML
BH CV ECHO MEAS - SV(MOD-SP4): 43 ML
BH CV ECHO MEAS - SV(TEICH): 85 ML
BH CV ECHO MEAS - TAPSE (>1.6): 2.2 CM
BH CV ECHO MEAS - TR MAX PG: 26 MMHG
BH CV ECHO MEAS - TR MAX VEL: 253.8 CM/SEC
BH CV ECHO MEASUREMENTS AVERAGE E/E' RATIO: 8.98
BH CV IMMEDIATE POST TECH DATA BLOOD PRESSURE: NORMAL MMHG
BH CV IMMEDIATE POST TECH DATA HEART RATE: 93 BPM
BH CV REST NUCLEAR ISOTOPE DOSE: 10.9 MCI
BH CV STRESS BP STAGE 1: NORMAL
BH CV STRESS COMMENTS STAGE 1: NORMAL
BH CV STRESS DOSE REGADENOSON STAGE 1: 0.4
BH CV STRESS DURATION MIN STAGE 1: 1
BH CV STRESS DURATION SEC STAGE 1: 0
BH CV STRESS HR STAGE 1: 86
BH CV STRESS NUCLEAR ISOTOPE DOSE: 31.5 MCI
BH CV STRESS O2 STAGE 1: 100
BH CV STRESS PROTOCOL 1: NORMAL
BH CV STRESS RECOVERY BP: NORMAL MMHG
BH CV STRESS RECOVERY HR: 90 BPM
BH CV STRESS RECOVERY O2: 100 %
BH CV STRESS STAGE 1: 1
BH CV THREE MINUTE POST TECH DATA BLOOD PRESSURE: NORMAL MMHG
BH CV THREE MINUTE POST TECH DATA HEART RATE: 91 BPM
BH CV XLRA - TDI S': 9.4 CM/SEC
BILIRUB SERPL-MCNC: 0.5 MG/DL (ref 0–1.2)
BILIRUB UR QL STRIP: NEGATIVE
BUN SERPL-MCNC: 14 MG/DL (ref 8–23)
BUN SERPL-MCNC: 21 MG/DL (ref 8–23)
BUN/CREAT SERPL: 15.9 (ref 7–25)
BUN/CREAT SERPL: 17.8 (ref 7–25)
CALCIUM SERPL-MCNC: 9.5 MG/DL (ref 8.6–10.5)
CALCIUM SPEC-SCNC: 9.7 MG/DL (ref 8.6–10.5)
CHLORIDE SERPL-SCNC: 106 MMOL/L (ref 98–107)
CHLORIDE SERPL-SCNC: 97 MMOL/L (ref 98–107)
CHOLEST SERPL-MCNC: 93 MG/DL (ref 0–200)
CLARITY UR: CLEAR
CO2 SERPL-SCNC: 28.4 MMOL/L (ref 22–29)
CO2 SERPL-SCNC: 29 MMOL/L (ref 22–29)
COLOR UR: YELLOW
CREAT SERPL-MCNC: 0.88 MG/DL (ref 0.76–1.27)
CREAT SERPL-MCNC: 1.18 MG/DL (ref 0.76–1.27)
CREAT UR-MCNC: 99.8 MG/DL
DEPRECATED RDW RBC AUTO: 87.9 FL (ref 37–54)
EOSINOPHIL # BLD AUTO: 0.1 10*3/MM3 (ref 0–0.4)
EOSINOPHIL NFR BLD AUTO: 2.8 % (ref 0.3–6.2)
ERYTHROCYTE [DISTWIDTH] IN BLOOD BY AUTOMATED COUNT: 22.2 % (ref 12.3–15.4)
GFR SERPL CREATININE-BSD FRML MDRD: 83 ML/MIN/1.73
GLOBULIN UR ELPH-MCNC: 2.8 GM/DL
GLUCOSE SERPL-MCNC: 118 MG/DL (ref 65–99)
GLUCOSE SERPL-MCNC: 360 MG/DL (ref 65–99)
GLUCOSE UR STRIP-MCNC: ABNORMAL MG/DL
HBA1C MFR BLD: 8.9 % (ref 4.8–5.6)
HBA1C MFR BLD: 9.6 % (ref 4.8–5.6)
HCT VFR BLD AUTO: 35.2 % (ref 37.5–51)
HDLC SERPL-MCNC: 33 MG/DL (ref 40–60)
HGB BLD-MCNC: 11.3 G/DL (ref 13–17.7)
HGB UR QL STRIP.AUTO: NEGATIVE
IMM GRANULOCYTES # BLD AUTO: 0.01 10*3/MM3 (ref 0–0.05)
IMM GRANULOCYTES NFR BLD AUTO: 0.3 % (ref 0–0.5)
IMP & REVIEW OF LAB RESULTS: NORMAL
INR PPP: 1 (ref 0.9–1.1)
KETONES UR QL STRIP: ABNORMAL
LDLC SERPL CALC-MCNC: 32 MG/DL (ref 0–100)
LEFT ATRIUM VOLUME INDEX: 26.4 ML/M2
LEUKOCYTE ESTERASE UR QL STRIP.AUTO: NEGATIVE
LV EF NUC BP: 60 %
LYMPHOCYTES # BLD AUTO: 0.95 10*3/MM3 (ref 0.7–3.1)
LYMPHOCYTES NFR BLD AUTO: 26.2 % (ref 19.6–45.3)
Lab: NORMAL
MAXIMAL PREDICTED HEART RATE: 137 BPM
MAXIMAL PREDICTED HEART RATE: 137 BPM
MCH RBC QN AUTO: 33.6 PG (ref 26.6–33)
MCHC RBC AUTO-ENTMCNC: 32.1 G/DL (ref 31.5–35.7)
MCV RBC AUTO: 104.8 FL (ref 79–97)
MICROALBUMIN UR-MCNC: 9.9 UG/ML
MONOCYTES # BLD AUTO: 0.46 10*3/MM3 (ref 0.1–0.9)
MONOCYTES NFR BLD AUTO: 12.7 % (ref 5–12)
NEUTROPHILS NFR BLD AUTO: 2.08 10*3/MM3 (ref 1.7–7)
NEUTROPHILS NFR BLD AUTO: 57.2 % (ref 42.7–76)
NITRITE UR QL STRIP: NEGATIVE
NRBC BLD AUTO-RTO: 0 /100 WBC (ref 0–0.2)
PERCENT MAX PREDICTED HR: 62.77 %
PH UR STRIP.AUTO: <=5 [PH] (ref 5–8)
PLATELET # BLD AUTO: 125 10*3/MM3 (ref 140–450)
PMV BLD AUTO: 10.1 FL (ref 6–12)
POTASSIUM SERPL-SCNC: 4.2 MMOL/L (ref 3.5–5.2)
POTASSIUM SERPL-SCNC: 5.2 MMOL/L (ref 3.5–5.2)
PROT SERPL-MCNC: 7 G/DL (ref 6–8.5)
PROT UR QL STRIP: ABNORMAL
PROTHROMBIN TIME: 13 SECONDS (ref 11.7–14.2)
RBC # BLD AUTO: 3.36 10*6/MM3 (ref 4.14–5.8)
SINUS: 3 CM
SODIUM SERPL-SCNC: 136 MMOL/L (ref 136–145)
SODIUM SERPL-SCNC: 145 MMOL/L (ref 136–145)
SP GR UR STRIP: >=1.03 (ref 1–1.03)
STJ: 3 CM
STRESS BASELINE BP: NORMAL MMHG
STRESS BASELINE HR: 78 BPM
STRESS O2 SAT REST: 100 %
STRESS PERCENT HR: 74 %
STRESS POST ESTIMATED WORKLOAD: 1 METS
STRESS POST EXERCISE DUR MIN: 1 MIN
STRESS POST EXERCISE DUR SEC: 0 SEC
STRESS POST O2 SAT PEAK: 100 %
STRESS POST PEAK BP: NORMAL MMHG
STRESS POST PEAK HR: 86 BPM
STRESS TARGET HR: 116 BPM
STRESS TARGET HR: 116 BPM
TRIGL SERPL-MCNC: 165 MG/DL (ref 0–150)
UROBILINOGEN UR QL STRIP: ABNORMAL
VLDLC SERPL CALC-MCNC: 28 MG/DL (ref 5–40)
WBC # BLD AUTO: 3.63 10*3/MM3 (ref 3.4–10.8)

## 2021-01-01 PROCEDURE — 0 TECHNETIUM SESTAMIBI: Performed by: INTERNAL MEDICINE

## 2021-01-01 PROCEDURE — 93016 CV STRESS TEST SUPVJ ONLY: CPT | Performed by: INTERNAL MEDICINE

## 2021-01-01 PROCEDURE — A9500 TC99M SESTAMIBI: HCPCS | Performed by: INTERNAL MEDICINE

## 2021-01-01 PROCEDURE — 99213 OFFICE O/P EST LOW 20 MIN: CPT | Performed by: INTERNAL MEDICINE

## 2021-01-01 PROCEDURE — 93306 TTE W/DOPPLER COMPLETE: CPT

## 2021-01-01 PROCEDURE — 87147 CULTURE TYPE IMMUNOLOGIC: CPT

## 2021-01-01 PROCEDURE — 85730 THROMBOPLASTIN TIME PARTIAL: CPT

## 2021-01-01 PROCEDURE — 93306 TTE W/DOPPLER COMPLETE: CPT | Performed by: INTERNAL MEDICINE

## 2021-01-01 PROCEDURE — 81003 URINALYSIS AUTO W/O SCOPE: CPT

## 2021-01-01 PROCEDURE — 85025 COMPLETE CBC W/AUTO DIFF WBC: CPT

## 2021-01-01 PROCEDURE — 80053 COMPREHEN METABOLIC PANEL: CPT

## 2021-01-01 PROCEDURE — 93000 ELECTROCARDIOGRAM COMPLETE: CPT | Performed by: INTERNAL MEDICINE

## 2021-01-01 PROCEDURE — 99214 OFFICE O/P EST MOD 30 MIN: CPT | Performed by: NURSE PRACTITIONER

## 2021-01-01 PROCEDURE — 36415 COLL VENOUS BLD VENIPUNCTURE: CPT

## 2021-01-01 PROCEDURE — 78452 HT MUSCLE IMAGE SPECT MULT: CPT

## 2021-01-01 PROCEDURE — 85610 PROTHROMBIN TIME: CPT

## 2021-01-01 PROCEDURE — 73560 X-RAY EXAM OF KNEE 1 OR 2: CPT

## 2021-01-01 PROCEDURE — 78452 HT MUSCLE IMAGE SPECT MULT: CPT | Performed by: INTERNAL MEDICINE

## 2021-01-01 PROCEDURE — 71046 X-RAY EXAM CHEST 2 VIEWS: CPT

## 2021-01-01 PROCEDURE — 93017 CV STRESS TEST TRACING ONLY: CPT

## 2021-01-01 PROCEDURE — 87086 URINE CULTURE/COLONY COUNT: CPT

## 2021-01-01 PROCEDURE — 93000 ELECTROCARDIOGRAM COMPLETE: CPT | Performed by: NURSE PRACTITIONER

## 2021-01-01 PROCEDURE — 25010000002 REGADENOSON 0.4 MG/5ML SOLUTION: Performed by: INTERNAL MEDICINE

## 2021-01-01 PROCEDURE — 93018 CV STRESS TEST I&R ONLY: CPT | Performed by: INTERNAL MEDICINE

## 2021-01-01 RX ORDER — CHLORHEXIDINE GLUCONATE 500 MG/1
CLOTH TOPICAL
COMMUNITY

## 2021-01-01 RX ORDER — EMPAGLIFLOZIN 25 MG/1
TABLET, FILM COATED ORAL
Qty: 90 TABLET | Refills: 3 | Status: SHIPPED | OUTPATIENT
Start: 2021-01-01

## 2021-01-01 RX ORDER — METOPROLOL SUCCINATE 25 MG/1
TABLET, EXTENDED RELEASE ORAL
Qty: 45 TABLET | Refills: 3 | Status: SHIPPED | OUTPATIENT
Start: 2021-01-01

## 2021-01-01 RX ORDER — OMEPRAZOLE 20 MG/1
20 CAPSULE, DELAYED RELEASE ORAL DAILY
Qty: 30 CAPSULE | Refills: 0 | OUTPATIENT
Start: 2021-01-01

## 2021-01-01 RX ORDER — FLURBIPROFEN SODIUM 0.3 MG/ML
SOLUTION/ DROPS OPHTHALMIC
Qty: 360 EACH | Refills: 1 | Status: SHIPPED | OUTPATIENT
Start: 2021-01-01

## 2021-01-01 RX ORDER — ONDANSETRON HYDROCHLORIDE 8 MG/1
8 TABLET, FILM COATED ORAL EVERY 8 HOURS PRN
COMMUNITY
Start: 2021-01-01

## 2021-01-01 RX ORDER — EZETIMIBE AND SIMVASTATIN 10; 40 MG/1; MG/1
1 TABLET ORAL DAILY
Qty: 90 TABLET | Refills: 1 | Status: SHIPPED | OUTPATIENT
Start: 2021-01-01 | End: 2021-01-01

## 2021-01-01 RX ORDER — PREDNISONE 1 MG/1
5 TABLET ORAL DAILY
COMMUNITY
Start: 2021-01-01 | End: 2021-01-01

## 2021-01-01 RX ORDER — OMEPRAZOLE 20 MG/1
20 CAPSULE, DELAYED RELEASE ORAL DAILY
Qty: 30 CAPSULE | Refills: 0 | Status: SHIPPED | OUTPATIENT
Start: 2021-01-01 | End: 2021-01-01 | Stop reason: SDUPTHER

## 2021-01-01 RX ORDER — CIPROFLOXACIN 500 MG/1
500 TABLET, FILM COATED ORAL 2 TIMES DAILY
COMMUNITY
Start: 2021-01-01 | End: 2021-01-01

## 2021-01-01 RX ORDER — ERGOCALCIFEROL 1.25 MG/1
CAPSULE ORAL
Qty: 12 CAPSULE | Refills: 1 | Status: SHIPPED | OUTPATIENT
Start: 2021-01-01

## 2021-01-01 RX ORDER — OMEPRAZOLE 20 MG/1
20 CAPSULE, DELAYED RELEASE ORAL DAILY
Qty: 30 CAPSULE | Refills: 11 | OUTPATIENT
Start: 2021-01-01

## 2021-01-01 RX ORDER — SULFAMETHOXAZOLE AND TRIMETHOPRIM 800; 160 MG/1; MG/1
1 TABLET ORAL 2 TIMES DAILY
COMMUNITY
Start: 2021-01-01 | End: 2021-01-01

## 2021-01-01 RX ORDER — HYDROXYZINE HYDROCHLORIDE 25 MG/1
25 TABLET, FILM COATED ORAL EVERY 6 HOURS PRN
COMMUNITY
Start: 2021-01-01

## 2021-01-01 RX ORDER — FLUCONAZOLE 150 MG/1
150 TABLET ORAL ONCE
COMMUNITY
Start: 2021-01-01 | End: 2021-01-01

## 2021-01-01 RX ORDER — ERGOCALCIFEROL 1.25 MG/1
CAPSULE ORAL
Qty: 12 CAPSULE | Refills: 1 | Status: SHIPPED | OUTPATIENT
Start: 2021-01-01 | End: 2021-01-01

## 2021-01-01 RX ORDER — OMEPRAZOLE 20 MG/1
20 CAPSULE, DELAYED RELEASE ORAL DAILY
Qty: 30 CAPSULE | Refills: 11 | Status: SHIPPED | OUTPATIENT
Start: 2021-01-01

## 2021-01-01 RX ORDER — GABAPENTIN 100 MG/1
100 CAPSULE ORAL 3 TIMES DAILY
COMMUNITY
Start: 2021-01-01 | End: 2021-01-01 | Stop reason: DRUGHIGH

## 2021-01-01 RX ORDER — ACETAMINOPHEN 500 MG
500 TABLET ORAL EVERY 6 HOURS PRN
COMMUNITY

## 2021-01-01 RX ORDER — INSULIN GLARGINE 300 U/ML
INJECTION, SOLUTION SUBCUTANEOUS
Qty: 42 ML | Refills: 1 | Status: SHIPPED | OUTPATIENT
Start: 2021-01-01

## 2021-01-01 RX ORDER — POLYETHYLENE GLYCOL 3350, SODIUM SULFATE ANHYDROUS, SODIUM BICARBONATE, SODIUM CHLORIDE, POTASSIUM CHLORIDE 236; 22.74; 6.74; 5.86; 2.97 G/4L; G/4L; G/4L; G/4L; G/4L
4000 POWDER, FOR SOLUTION ORAL
COMMUNITY
Start: 2021-01-01 | End: 2021-01-01

## 2021-01-01 RX ORDER — EMPAGLIFLOZIN 25 MG/1
TABLET, FILM COATED ORAL
COMMUNITY
Start: 2021-01-01 | End: 2021-01-01 | Stop reason: SDUPTHER

## 2021-01-01 RX ORDER — METOPROLOL SUCCINATE 25 MG/1
TABLET, EXTENDED RELEASE ORAL
Qty: 45 TABLET | Refills: 1 | Status: SHIPPED | OUTPATIENT
Start: 2021-01-01 | End: 2021-01-01

## 2021-01-01 RX ORDER — CLOTRIMAZOLE 1 %
CREAM (GRAM) TOPICAL SEE ADMIN INSTRUCTIONS
COMMUNITY
Start: 2021-01-01 | End: 2021-01-01

## 2021-01-01 RX ORDER — OMEPRAZOLE 20 MG/1
20 CAPSULE, DELAYED RELEASE ORAL DAILY
Qty: 30 CAPSULE | Refills: 0 | Status: SHIPPED | OUTPATIENT
Start: 2021-01-01 | End: 2021-01-01

## 2021-01-01 RX ORDER — INSULIN LISPRO 100 [IU]/ML
INJECTION, SOLUTION INTRAVENOUS; SUBCUTANEOUS
Qty: 60 ML | Refills: 0 | Status: SHIPPED | OUTPATIENT
Start: 2021-01-01

## 2021-01-01 RX ORDER — EZETIMIBE AND SIMVASTATIN 10; 40 MG/1; MG/1
1 TABLET ORAL DAILY
Qty: 90 TABLET | Refills: 3 | Status: SHIPPED | OUTPATIENT
Start: 2021-01-01

## 2021-01-01 RX ORDER — LINAGLIPTIN 5 MG/1
TABLET, FILM COATED ORAL
Qty: 90 TABLET | Refills: 3 | Status: SHIPPED | OUTPATIENT
Start: 2021-01-01

## 2021-01-01 RX ORDER — TAMSULOSIN HYDROCHLORIDE 0.4 MG/1
1 CAPSULE ORAL DAILY
COMMUNITY
Start: 2021-01-01 | End: 2021-01-01

## 2021-01-01 RX ORDER — MIRABEGRON 25 MG/1
25 TABLET, FILM COATED, EXTENDED RELEASE ORAL EVERY MORNING
COMMUNITY
Start: 2021-01-01 | End: 2021-01-01

## 2021-01-01 RX ORDER — GABAPENTIN 800 MG/1
800 TABLET ORAL 3 TIMES DAILY
COMMUNITY
Start: 2021-01-01

## 2021-01-01 RX ORDER — PROMETHAZINE HYDROCHLORIDE 25 MG/1
TABLET ORAL
COMMUNITY
Start: 2021-01-01 | End: 2021-01-01

## 2021-01-01 RX ORDER — EMPAGLIFLOZIN 25 MG/1
25 TABLET, FILM COATED ORAL DAILY
Qty: 90 TABLET | Refills: 1 | Status: SHIPPED | OUTPATIENT
Start: 2021-01-01 | End: 2021-01-01

## 2021-01-01 RX ORDER — POLYETHYLENE GLYCOL 3350, SODIUM SULFATE ANHYDROUS, SODIUM BICARBONATE, SODIUM CHLORIDE, POTASSIUM CHLORIDE 236; 22.74; 6.74; 5.86; 2.97 G/4L; G/4L; G/4L; G/4L; G/4L
POWDER, FOR SOLUTION ORAL
COMMUNITY
Start: 2021-01-01 | End: 2021-01-01

## 2021-01-01 RX ORDER — FLASH GLUCOSE SENSOR
1 KIT MISCELLANEOUS
Qty: 6 EACH | Refills: 1 | Status: SHIPPED | OUTPATIENT
Start: 2021-01-01 | End: 2021-01-01 | Stop reason: SDUPTHER

## 2021-01-01 RX ORDER — FLASH GLUCOSE SENSOR
1 KIT MISCELLANEOUS
Qty: 6 EACH | Refills: 1 | Status: SHIPPED | OUTPATIENT
Start: 2021-01-01

## 2021-01-01 RX ORDER — EMPAGLIFLOZIN 25 MG/1
TABLET, FILM COATED ORAL
Qty: 90 TABLET | Refills: 3 | OUTPATIENT
Start: 2021-01-01

## 2021-01-01 RX ADMIN — REGADENOSON 0.4 MG: 0.08 INJECTION, SOLUTION INTRAVENOUS at 10:40

## 2021-01-01 RX ADMIN — TECHNETIUM TC 99M SESTAMIBI 1 DOSE: 1 INJECTION INTRAVENOUS at 08:10

## 2021-01-01 RX ADMIN — TECHNETIUM TC 99M SESTAMIBI 1 DOSE: 1 INJECTION INTRAVENOUS at 10:40

## 2021-01-01 ASSESSMENT — KOOS JR
KOOS JR SCORE: 21
KOOS JR SCORE: 34.174

## 2021-04-26 NOTE — PROGRESS NOTES
"Chief Complaint  Diabetes (type 2 diabetes: check blood sugar 3-4 x day)    Subjective          Noel Garcia presents to [facility] ENDOCRINOLOGY  History of Present Illness    Type 2 dm    Diagnosed in his 50s  Today in clinic pt reports being on tradjenta 5mg daily, Jardiance 25mg daily, humalog 18-20u AC ( he does meet some doses), Toujeo 140u daily   Sensor - judith ( did not bring his sensor, BS )  Dm retinopathy - denies ,Last eye exam - one month ago   Dm nephropathy - denies  Dm neuropathy - denies  CAD - yes  CVA - yes  Episodes of hypoglycemia - denies  Pt is less physically active. weight has been stable.   Pt tries to follow DM diet for most part.   On Ace inb and statin  Lab Results   Component Value Date    HGBA1C 9.60 (H) 04/09/2021     Hyperlipidemia  Ezetimibe- simvastatin 10-40mg daily   Lab Results   Component Value Date    CHOL 103 02/13/2019    CHLPL 93 04/09/2021    TRIG 165 (H) 04/09/2021    HDL 33 (L) 04/09/2021    LDL 32 04/09/2021       D/w lung cancer last week     Objective   Vital Signs:   /80 (BP Location: Left arm, Patient Position: Sitting, Cuff Size: Adult)   Pulse 85   Ht 182.9 cm (72.01\")   Wt 113 kg (249 lb 9.6 oz)   SpO2 94%   BMI 33.84 kg/m²     Physical Exam  Vitals reviewed.   Constitutional:       General: He is not in acute distress.  HENT:      Head: Normocephalic and atraumatic.   Cardiovascular:      Rate and Rhythm: Normal rate and regular rhythm.   Pulmonary:      Effort: Pulmonary effort is normal. No respiratory distress.   Musculoskeletal:         General: No signs of injury. Normal range of motion.      Cervical back: Normal range of motion and neck supple.   Skin:     General: Skin is warm and dry.   Neurological:      Mental Status: He is alert and oriented to person, place, and time. Mental status is at baseline.   Psychiatric:         Mood and Affect: Mood normal.         Behavior: Behavior normal.         Thought Content: " Thought content normal.         Judgment: Judgment normal.        Result Review :   The following data was reviewed by: EMILY Bartlett on 04/26/2021:  Common labs    Common Labsle 4/1/21 4/9/21 4/9/21 4/9/21 4/9/21 4/20/21     1231 1231 1231 1231    Glucose   118 (A)      BUN   21      Creatinine   1.18      eGFR Non African Am   59 (A)      eGFR African Am   72      Sodium   145      Potassium   5.2      Chloride   106      Calcium   9.5      WBC 7.18     7.35   Hemoglobin 9.6 (A)     10.6 (A)   Hematocrit 33.3 (A)     36.1 (A)   Platelets 223     208   Total Cholesterol    93     Triglycerides    165 (A)     HDL Cholesterol    33 (A)     LDL Cholesterol     32     Hemoglobin A1C  9.60 (A)       Microalbumin, Urine     9.9    (A) Abnormal value       Comments are available for some flowsheets but are not being displayed.                     Assessment and Plan    Diagnoses and all orders for this visit:    1. Type 2 diabetes mellitus with hyperglycemia, with long-term current use of insulin (CMS/[facility]) (Primary)  -     linagliptin (Tradjenta) 5 MG tablet tablet; Take 1 tablet by mouth Daily.  Dispense: 90 tablet; Refill: 1  -     Jardiance 25 MG tablet; Take 25 mg by mouth Daily.  Dispense: 90 tablet; Refill: 1  -     HumaLOG KwikPen 100 UNIT/ML solution pen-injector; INJECT 22 UNITS  SUBCUTANEOUSLY PRIOR TO  EVERY MEAL  Dispense: 60 mL; Refill: 0  -     ezetimibe-simvastatin (VYTORIN) 10-40 MG per tablet; Take 1 tablet by mouth Daily.  Dispense: 90 tablet; Refill: 1  -     omeprazole (priLOSEC) 20 MG capsule; Take 1 capsule by mouth Daily.  Dispense: 30 capsule; Refill: 0  -     Insulin Glargine, 2 Unit Dial, (TOUJEO) 300 UNIT/ML solution pen-injector injection; Inject 140 Units under the skin into the appropriate area as directed Daily for 90 days.  Dispense: 54 mL; Refill: 0  -     metFORMIN (GLUCOPHAGE) 500 MG tablet; Take 2 tablets by mouth 2 (Two) Times a Day With Meals for 180 days.  Dispense: 360  tablet; Refill: 0  -     ergocalciferol (ERGOCALCIFEROL) 1.25 MG (06656 UT) capsule; Take 1 capsule weekly  Dispense: 12 capsule; Refill: 1  -     Continuous Blood Gluc Sensor (FreeStyle Sj 14 Day Sensor) misc; 1 each Every 14 (Fourteen) Days.  Dispense: 6 each; Refill: 1    2. Mixed hyperlipidemia  -     ezetimibe-simvastatin (VYTORIN) 10-40 MG per tablet; Take 1 tablet by mouth Daily.  Dispense: 90 tablet; Refill: 1        Follow Up   Return in about 4 months (around 8/26/2021).   Change Toujeo to 75u BID   He does not want to deal with a sliding scale insulin, encouraged to increase his current insulin dose by 2-4u at meals   Discussed hypoglycemia treatment and s/s along with written instructions   Continue diabetic diet  Continue statin and low-cholesterol diet  Moderate hypoglycemia risk given his age  High risk of diabetic complications given his prolonged hyperglycemia  Continue Tradjenta and Jardiance      Patient was given instructions and counseling regarding his condition or for health maintenance advice. Please see specific information pulled into the AVS if appropriate.     EMILY Bartlett

## 2021-04-26 NOTE — PATIENT INSTRUCTIONS
Change Toujeo to 75u BID   Increase humalog by 2-4u at each meals       Hypoglycemia  Hypoglycemia occurs when the level of sugar (glucose) in the blood is too low. Hypoglycemia can happen in people who have or do not have diabetes. It can develop quickly, and it can be a medical emergency. For most people, a blood glucose level below 70 mg/dL (3.9 mmol/L) is considered hypoglycemia.  Glucose is a type of sugar that provides the body's main source of energy. Certain hormones (insulin and glucagon) control the level of glucose in the blood. Insulin lowers blood glucose, and glucagon raises blood glucose. Hypoglycemia can result from having too much insulin in the bloodstream, or from not eating enough food that contains glucose. You may also have reactive hypoglycemia, which happens within 4 hours after eating a meal.  What are the causes?  Hypoglycemia occurs most often in people who have diabetes and may be caused by:  · Diabetes medicine.  · Not eating enough, or not eating often enough.  · Increased physical activity.  · Drinking alcohol on an empty stomach.  If you do not have diabetes, hypoglycemia may be caused by:  · A tumor in the pancreas.  · Not eating enough, or not eating for long periods at a time (fasting).  · A severe infection or illness.  · Problems after having bariatric surgery.  · Organ failure, such as kidney or liver failure.  · Certain medicines.  What increases the risk?  Hypoglycemia is more likely to develop in people who:  · Have diabetes and take medicines to lower blood glucose.  · Abuse alcohol.  · Have a severe illness.  What are the signs or symptoms?  Symptoms vary depending on whether the condition is mild, moderate, or severe.  Mild hypoglycemia  · Hunger.  · Anxiety.  · Sweating and feeling clammy.  · Dizziness or feeling light-headed.  · Sleepiness or restless sleep.  · Nausea.  · Increased heart rate.  · Headache.  · Blurry vision.  · Irritability.  · Tingling or numbness around  the mouth, lips, or tongue.  · A change in coordination.  Moderate hypoglycemia  · Confusion and poor judgment.  · Behavior changes.  · Weakness.  · Irregular heartbeat.  Severe hypoglycemia  Severe hypoglycemia is a medical emergency. It can cause:  · Fainting.  · Seizures.  · Loss of consciousness (coma).  · Death.  How is this diagnosed?  Hypoglycemia is diagnosed with a blood test to measure your blood glucose level. This blood test is done while you are having symptoms. Your health care provider may also do a physical exam and review your medical history.  How is this treated?  This condition can be treated by immediately eating or drinking something that contains sugar with 15 grams of rapid-acting carbohydrate, such as:  · 4 oz (120 mL) of fruit juice.  · 4-6 oz (120-150 mL) of regular soda (not diet soda).  · 8 oz (240 mL) of low-fat milk.  · Several pieces of hard candy. Check food labels to find out how many to eat for 15 grams.  · 1 Tbsp (15 mL) of sugar or honey.  Treating hypoglycemia if you have diabetes  If you are alert and able to swallow safely, follow the 15:15 rule:  · Take 15 grams of a rapid-acting carbohydrate. Talk with your health care provider about how much you should take. Options for getting 15 grams of rapid-acting carbohydrate include:  ? Glucose tablets (take 4 tablets).  ? Several pieces of hard candy. Check food labels to find out how many pieces to eat for 15 grams.  ? 4 oz (120 mL) of fruit juice.  ? 4-6 oz (120-150 mL) of regular (not diet) soda.  ? 1 Tbsp (15 mL) of honey or sugar.  · Check your blood glucose 15 minutes after you take the carbohydrate.  · If the repeat blood glucose level is still at or below 70 mg/dL (3.9 mmol/L), take 15 grams of a carbohydrate again.  · If your blood glucose level does not increase above 70 mg/dL (3.9 mmol/L) after 3 tries, seek emergency medical care.  · After your blood glucose level returns to normal, eat a meal or a snack within 1  hour.    Treating severe hypoglycemia  Severe hypoglycemia is when your blood glucose level is at or below 54 mg/dL (3 mmol/L). Severe hypoglycemia is a medical emergency. Get medical help right away.  If you have severe hypoglycemia and you cannot eat or drink, you will need to be given glucagon. A family member or close friend should learn how to check your blood glucose and how to give you glucagon. Ask your health care provider if you need to have an emergency glucagon kit available.  Severe hypoglycemia may need to be treated in a hospital. The treatment may include getting glucose through an IV. You may also need treatment for the cause of your hypoglycemia.  Follow these instructions at home:    General instructions  · Take over-the-counter and prescription medicines only as told by your health care provider.  · Monitor your blood glucose as told by your health care provider.  · If you drink alcohol:  ? Limit how much you use to:  § 0-1 drink a day for nonpregnant women.  § 0-2 drinks a day for men.  ? Be aware of how much alcohol is in your drink. In the [place] one drink equals one 12 oz bottle of beer (355 mL), one 5 oz glass of wine (148 mL), or one 1½ oz glass of hard liquor (44 mL).  · Keep all follow-up visits as told by your health care provider. This is important.  If you have diabetes:  · Always have a rapid-acting carbohydrate (15 grams) option with you to treat low blood glucose.  · Follow your diabetes management plan as directed. Make sure you:  ? Know the symptoms of hypoglycemia. It is important to treat it right away to prevent it from becoming severe.  ? Check your blood glucose as often as told. Always check before and after exercise.  ? Always check your blood glucose before you drive a motorized vehicle.  ? Take your medicines as told.  ? Follow your meal plan. Eat on time, and do not skip meals.  · Share your diabetes management plan with people in your workplace, school, and  household.  · Carry a medical alert card or wear medical alert jewelry.  Contact a health care provider if:  · You have problems keeping your blood glucose in your target range.  · You have frequent episodes of hypoglycemia.  Get help right away if:  · You continue to have hypoglycemia symptoms after eating or drinking something that contains 15 grams of fast-acting carbohydrate and you cannot get your blood glucose above 70 mg/dL (3.9 mmol/L) while following the 15:15 rule.  · Your blood glucose is at or below 54 mg/dL (3 mmol/L).  · You have a seizure.  · You faint.  These symptoms may represent a serious problem that is an emergency. Do not wait to see if the symptoms will go away. Get medical help right away. Call your local emergency services (911 in the [place] Do not drive yourself to the hospital.  Summary  · Hypoglycemia occurs when the level of sugar (glucose) in the blood is too low.  · Hypoglycemia can happen in people who have or do not have diabetes. It can develop quickly, and it can be a medical emergency.  · Make sure you know the symptoms of hypoglycemia and how to treat it.  · Always have a rapid-acting carbohydrate snack with you to treat low blood sugar.  This information is not intended to replace advice given to you by your health care provider. Make sure you discuss any questions you have with your health care provider.  Document Revised: 11/11/2020 Document Reviewed: 11/11/2020  [employer] Patient Education © 2021 [employer]

## 2021-05-03 NOTE — TELEPHONE ENCOUNTER
Letter was faxed 4/28/21   Re faxed letter to [employer] today and called l/m informing it was faxed.

## 2021-05-10 NOTE — PROGRESS NOTES
FOLLOW UP     WANTS TO DISCUSS HIS DIAGNOSIS    Subjective:        Noel Garcia is a 80 y.o. male who here for follow up    CC  RBBB  HPI  80-year-old male with a known history of benign essential arterial hypertension, hyperlipidemia, paroxysmal atrial fibrillation here for the follow-up, denies any chest pains tightness heaviness or the pressure sensation     Problems Addressed this Visit        Cardiac and Vasculature    Benign essential hypertension    Hyperlipidemia    Paroxysmal atrial fibrillation (CMS/HCC) - Primary       Coag and Thromboembolic    Anticoagulated      Diagnoses       Codes Comments    Paroxysmal atrial fibrillation (CMS/HCC)    -  Primary ICD-10-CM: I48.0  ICD-9-CM: 427.31     Benign essential hypertension     ICD-10-CM: I10  ICD-9-CM: 401.1     Hyperlipidemia, unspecified hyperlipidemia type     ICD-10-CM: E78.5  ICD-9-CM: 272.4     Anticoagulated     ICD-10-CM: Z79.01  ICD-9-CM: V58.61         .Conclusion       · Successful right and left coronary angiogram and LV gram  · Normal left main  · Left anterior descending early atherosclerotic plaque including the diagonal and  branches  · Circumflex arteries are codominant with early atherosclerotic plaque  · Right coronary artery codominant with PDA 80% stenosis  · Normal LV gram            The following portions of the patient's history were reviewed and updated as appropriate: allergies, current medications, past family history, past medical history, past social history, past surgical history and problem list.    Past Medical History:   Diagnosis Date   • Benign essential hypertension    • Benign prostatic hyperplasia 1/21/2016   • Diabetes mellitus (CMS/[facility])    • Gastroesophageal reflux disease 1/21/2016   • GERD (gastroesophageal reflux disease)    • Hamartoma of lung (CMS/[facility])    • Hyperlipidemia    • Microalbuminuria    • Obesity    • Prostatic hyperplasia    • Type 2 diabetes mellitus (CMS/[facility])    • Vitamin D deficiency      " reports that he has quit smoking. His smoking use included cigarettes. He has never used smokeless tobacco. He reports that he does not drink alcohol and does not use drugs.   Family History   Problem Relation Age of Onset   • Hypertension Father    • Coronary artery disease Father    • Diabetes Father    • Hyperlipidemia Father    • Coronary artery disease Brother        Review of Systems  Constitutional: No wt loss, fever, fatigue  Gastrointestinal: No nausea, abdominal pain  Behavioral/Psych: No insomnia or anxiety   Cardiovascular no chest pains or tightness in the chest  Objective:       Physical Exam  /70   Pulse 87   Ht 182.9 cm (72.01\")   Wt 115 kg (253 lb)   BMI 34.30 kg/m²   General appearance: No acute changes   Neck: Trachea midline; NECK, supple, no thyromegaly or lymphadenopathy   Lungs: Normal size and shape, normal breath sounds, equal distribution of air, no rales and rhonchi   CV: S1-S2 regular, no murmurs, no rub, no gallop   Abdomen: Soft, non-tender; no masses , no abnormal abdominal sounds   Extremities: No deformity , normal color , no peripheral edema   Skin: Normal temperature, turgor and texture; no rash, ulcers            ECG 12 Lead    Date/Time: 5/10/2021 2:15 PM  Performed by: Can Carr MD  Authorized by: Can Carr MD   Comparison: compared with previous ECG   Similar to previous ECG  Rhythm: sinus rhythm  Conduction: right bundle branch block    Clinical impression: abnormal EKG              Echocardiogram:        Current Outpatient Medications:   •  Accu-Chek FastClix Lancets misc, CHECK BLOOD SUGAR 3 TIMES  DAILY, Disp: 306 each, Rfl: 1  •  apixaban (Eliquis) 5 MG tablet tablet, Take 1 tablet by mouth Every 12 (Twelve) Hours., Disp: 180 tablet, Rfl: 3  •  aspirin 81 MG chewable tablet, Chew 81 mg Daily., Disp: , Rfl:   •  Continuous Blood Gluc Sensor (FreeStyle Sj 14 Day Sensor) misc, 1 each Every 14 (Fourteen) Days., Disp: 6 each, Rfl: 1  •  " ergocalciferol (ERGOCALCIFEROL) 1.25 MG (40143 UT) capsule, Take 1 capsule weekly, Disp: 12 capsule, Rfl: 1  •  ezetimibe-simvastatin (VYTORIN) 10-40 MG per tablet, Take 1 tablet by mouth Daily., Disp: 90 tablet, Rfl: 1  •  furosemide (LASIX) 40 MG tablet, TAKE 1 TABLET BY MOUTH  DAILY, Disp: 90 tablet, Rfl: 1  •  glucose blood (Accu-Chek Asha Plus) test strip, 1 each by Other route 3 (Three) Times a Day. as directed, Disp: 300 each, Rfl: 1  •  HumaLOG KwikPen 100 UNIT/ML solution pen-injector, INJECT 22 UNITS  SUBCUTANEOUSLY PRIOR TO  EVERY MEAL, Disp: 60 mL, Rfl: 0  •  Insulin Glargine, 2 Unit Dial, (TOUJEO) 300 UNIT/ML solution pen-injector injection, Inject 140 Units under the skin into the appropriate area as directed Daily for 90 days., Disp: 54 mL, Rfl: 0  •  Insulin Pen Needle (B-D UF III MINI PEN NEEDLES) 31G X 5 MM misc, USE 4 TIMES DAILY FOR  INSULIN INJECTION, Disp: 360 each, Rfl: 1  •  Jardiance 25 MG tablet, Take 25 mg by mouth Daily., Disp: 90 tablet, Rfl: 1  •  linagliptin (Tradjenta) 5 MG tablet tablet, Take 1 tablet by mouth Daily., Disp: 90 tablet, Rfl: 1  •  metFORMIN (GLUCOPHAGE) 500 MG tablet, Take 2 tablets by mouth 2 (Two) Times a Day With Meals for 180 days., Disp: 360 tablet, Rfl: 0  •  metoprolol succinate XL (TOPROL-XL) 25 MG 24 hr tablet, Take 0.5 tablets by mouth Daily., Disp: 45 tablet, Rfl: 1  •  Myrbetriq 25 MG tablet sustained-release 24 hour 24 hr tablet, Take 25 mg by mouth Every Morning., Disp: , Rfl:   •  omeprazole (priLOSEC) 20 MG capsule, Take 1 capsule by mouth Daily., Disp: 30 capsule, Rfl: 0  •  polyethylene glycol (Golytely) 236 g solution, Take 4,000 mL by mouth., Disp: , Rfl:   •  potassium chloride (MICRO-K) 10 MEQ CR capsule, Take 1 capsule by mouth Daily., Disp: 180 capsule, Rfl: 1  •  tamsulosin (FLOMAX) 0.4 MG capsule 24 hr capsule, , Disp: , Rfl:   •  tiotropium bromide-olodaterol (STIOLTO RESPIMAT) 2.5-2.5 MCG/ACT aerosol solution inhaler, Inhale 2 puffs Daily.,  Disp: , Rfl:   •  PEG 3350-KCl-NaBcb-NaCl-NaSulf (PEG-3350/Electrolytes) 236 g reconstituted solution, MIX AND DRINK AS DIRECTED, Disp: , Rfl:   •  sulfamethoxazole-trimethoprim (BACTRIM DS,SEPTRA DS) 800-160 MG per tablet, Take 1 tablet by mouth 2 (Two) Times a Day., Disp: , Rfl:    Assessment:        Patient Active Problem List   Diagnosis   • Vitamin D deficiency   • Benign essential hypertension   • Diabetes mellitus (CMS/HCC)   • Gastroesophageal reflux disease   • Hyperlipidemia   • Microalbuminuria   • Adiposity   • Benign prostatic hyperplasia   • Uncontrolled type 2 diabetes mellitus (CMS/HCC)   • Cerebrovascular accident (CMS/HCC)   • Elevated lipids   • Hamartoma (CMS/HCC)   • Hypertension   • Malignant melanoma (CMS/HCC)   • Paroxysmal atrial fibrillation (CMS/HCC)   • Anticoagulation adequate   • CHF (congestive heart failure) (CMS/HCC)   • Abnormal cardiac function test     Conclusion       · Successful right and left coronary angiogram and LV gram  · Normal left main  · Left anterior descending early atherosclerotic plaque including the diagonal and  branches  · Circumflex arteries are codominant with early atherosclerotic plaque  · Right coronary artery codominant with PDA 80% stenosis  · Normal LV gram                  Plan:            ICD-10-CM ICD-9-CM   1. Paroxysmal atrial fibrillation (CMS/HCC)  I48.0 427.31   2. Benign essential hypertension  I10 401.1   3. Hyperlipidemia, unspecified hyperlipidemia type  E78.5 272.4   4. Anticoagulated  Z79.01 V58.61     1. Paroxysmal atrial fibrillation (CMS/HCC)  Under control    2. Benign essential hypertension  Blood pressure under control    3. Hyperlipidemia, unspecified hyperlipidemia type  Continue current treatment    4. Anticoagulated  Continue current treatment       6 MONTHS  COUNSELING:    Noel Scruggs was given to patient for the following topics: diagnostic results, risk factor reductions, impressions, risks and benefits  of treatment options and importance of treatment compliance .       SMOKING COUNSELING:    [unfilled]    Dictated using Dragon dictation

## 2021-07-01 NOTE — TELEPHONE ENCOUNTER
Pt walked in     [facility]eds script for 14 day [facility] [place], [place] - [address] [place] [address], [address] - [phone]  - [phone] FX Phone:  [phone]   Fax:  [phone]        He has nothing    im going to try to find him a sample

## 2021-07-01 NOTE — TELEPHONE ENCOUNTER
Patient left voice mail wanting a return call in regards to his  Sj sensors  [phone]  Patient saw  Lisa El    Was in rehab (discharged approximately 4 weeks ago).

## 2021-07-22 NOTE — PROGRESS NOTES
"Chief Complaint  Diabetes    Subjective          Noel Garcia presents to [facility] ENDOCRINOLOGY  History of Present Illness     Type 2 dm with mixed HLP   Diagnosed in his 50s  Today in clinic pt reports being on tradjenta 5mg daily, metformin 1000mg BID Jardiance 25mg daily, humalog 22 u AC Toujeo 75u BID  Sensor - judith (did not bring his sensor, BS )  Dm retinopathy - denies ,Last eye exam - UTD  Dm nephropathy - denies  Dm neuropathy - denies  CAD - yes  CVA - yes  Episodes of hypoglycemia - denies  Pt is less physically active r/t knee pain. weight has been stable.   Pt tries to follow DM diet for most part.   On statin    Has lung cancer   Uses a cane and knee brace for knee pain     Objective   Vital Signs:   /72 (BP Location: Right arm, Patient Position: Sitting, Cuff Size: Adult)   Ht 182.9 cm (72.01\")   Wt 102 kg (225 lb 1.6 oz)   BMI 30.52 kg/m²     Physical Exam  Vitals reviewed.   Constitutional:       General: He is not in acute distress.  HENT:      Head: Normocephalic and atraumatic.   Cardiovascular:      Rate and Rhythm: Normal rate and regular rhythm.   Pulmonary:      Effort: Pulmonary effort is normal. No respiratory distress.   Musculoskeletal:         General: Signs of injury present. Normal range of motion.      Cervical back: Normal range of motion and neck supple.      Comments: Right knee  cane   Skin:     General: Skin is warm and dry.   Neurological:      Mental Status: He is alert and oriented to person, place, and time. Mental status is at baseline.   Psychiatric:         Mood and Affect: Mood normal.         Behavior: Behavior normal.         Thought Content: Thought content normal.         Judgment: Judgment normal.        Result Review :   The following data was reviewed by: EMILY Bartlett on 07/22/2021:  Common labs    Common Labsle 7/1/21 7/15/21 7/22/21   WBC 3.56 (A) 4.95 2.69 (A)   Hemoglobin 10.2 (A) 10.5 (A) 10.5 (A)   Hematocrit " 34.9 (A) 35.9 (A) 35.3 (A)   Platelets 136 (A) 98 (A) 85 (A)   (A) Abnormal value                      Assessment and Plan    Diagnoses and all orders for this visit:    1. Type 2 diabetes mellitus with hyperglycemia, with long-term current use of insulin (CMS/[facility]) (Primary)  -     Hemoglobin A1c  -     Hemoglobin A1c; Future  -     Comprehensive Metabolic Panel; Future  -     Lipid Panel; Future  -     Microalbumin / Creatinine Urine Ratio - Urine, Clean Catch; Future        Follow Up   No follow-ups on file.     Will see Edi in December  a1c today  Continue current regimen as he is tolerating this well without side effects until labs are back,   goal a1c <7.5%, will adjust insulin accordingly  Encouraged patient to bring his judith reader with him next visit for download  On statin     Patient was given instructions and counseling regarding his condition or for health maintenance advice. Please see specific information pulled into the AVS if appropriate.     EMILY Bartlett

## 2021-07-22 NOTE — PROGRESS NOTES
[facility] Pharmacy Visit [facility] Endocrinology    Noel Garcia is a 81 y.o. male seen by Endocrinology and assessed by the Medication Management Clinic Pharmacist at [facility].  This was an initial MTM visit for Noel Garcia.    Noel Garcia was introduced to the [facility] and his allergies, PMH, and medications were reviewed.       Past Medical History:   Diagnosis Date   • Benign essential hypertension    • Benign prostatic hyperplasia 1/21/2016   • Diabetes mellitus (CMS/[facility])    • Gastroesophageal reflux disease 1/21/2016   • GERD (gastroesophageal reflux disease)    • Hamartoma of lung (CMS/[facility])    • Hyperlipidemia    • Microalbuminuria    • Obesity    • Prostatic hyperplasia    • Type 2 diabetes mellitus (CMS/[facility])    • Vitamin D deficiency      Social History     Socioeconomic History   • Marital status:      Spouse name: Not on file   • Number of children: Not on file   • Years of education: Not on file   • Highest education level: Not on file   Tobacco Use   • Smoking status: Former Smoker     Types: Cigarettes   • Smokeless tobacco: Never Used   Vaping Use   • Vaping Use: Never used   Substance and Sexual Activity   • Alcohol use: No   • Drug use: No   • Sexual activity: Defer       Medication Allergies:  Gemfibrozil and Atorvastatin        Current Outpatient Medications:   •  Accu-Chek FastClix Lancets misc, CHECK BLOOD SUGAR 3 TIMES  DAILY, Disp: 306 each, Rfl: 1  •  apixaban (Eliquis) 5 MG tablet tablet, Take 1 tablet by mouth Every 12 (Twelve) Hours., Disp: 180 tablet, Rfl: 3  •  Continuous Blood Gluc Sensor (FreeStyle Sj 14 Day Sensor) misc, 1 each Every 14 (Fourteen) Days., Disp: 6 each, Rfl: 1  •  ezetimibe-simvastatin (VYTORIN) 10-40 MG per tablet, Take 1 tablet by mouth Daily., Disp: 90 tablet, Rfl: 1  •  furosemide (LASIX) 40 MG tablet, TAKE 1 TABLET BY MOUTH  DAILY (Patient taking differently: Take 40 mg by mouth Daily As  Needed (lower extremity edema).), Disp: 90 tablet, Rfl: 1  •  gabapentin (NEURONTIN) 100 MG capsule, Take 100 mg by mouth 3 (Three) Times a Day., Disp: , Rfl:   •  glucose blood (Accu-Chek Asha Plus) test strip, 1 each by Other route 3 (Three) Times a Day. as directed, Disp: 300 each, Rfl: 1  •  glucose blood test strip, Check blood sugar 3-4 times a day, Disp: 400 each, Rfl: 2  •  HumaLOG KwikPen 100 UNIT/ML solution pen-injector, INJECT 22 UNITS  SUBCUTANEOUSLY PRIOR TO  EVERY MEAL, Disp: 60 mL, Rfl: 0  •  hydrOXYzine (ATARAX) 25 MG tablet, Take 25 mg by mouth Every 6 (Six) Hours As Needed for Itching., Disp: , Rfl:   •  Insulin Pen Needle (B-D UF III MINI PEN NEEDLES) 31G X 5 MM misc, USE 4 TIMES DAILY FOR  INSULIN INJECTION, Disp: 360 each, Rfl: 1  •  Jardiance 25 MG tablet, Take 25 mg by mouth Daily., Disp: 90 tablet, Rfl: 1  •  linagliptin (Tradjenta) 5 MG tablet tablet, Take 1 tablet by mouth Daily., Disp: 90 tablet, Rfl: 1  •  metFORMIN (GLUCOPHAGE) 500 MG tablet, Take 2 tablets by mouth 2 (Two) Times a Day With Meals for 180 days., Disp: 360 tablet, Rfl: 0  •  metoprolol succinate XL (TOPROL-XL) 25 MG 24 hr tablet, TAKE ONE-HALF TABLET BY  MOUTH DAILY, Disp: 45 tablet, Rfl: 1  •  omeprazole (priLOSEC) 20 MG capsule, Take 1 capsule by mouth Daily., Disp: 30 capsule, Rfl: 0  •  ondansetron (ZOFRAN) 8 MG tablet, Take 8 mg by mouth Every 8 (Eight) Hours As Needed., Disp: , Rfl:   •  PEG 3350-KCl-NaBcb-NaCl-NaSulf (PEG-3350/Electrolytes) 236 g reconstituted solution, MIX AND DRINK AS DIRECTED, Disp: , Rfl:   •  polyethylene glycol (Golytely) 236 g solution, Take 4,000 mL by mouth., Disp: , Rfl:   •  potassium chloride (MICRO-K) 10 MEQ CR capsule, Take 1 capsule by mouth Daily. (Patient taking differently: Take 10 mEq by mouth Daily As Needed (when he takes furosemide).), Disp: 180 capsule, Rfl: 1  •  tiotropium bromide-olodaterol (STIOLTO RESPIMAT) 2.5-2.5 MCG/ACT aerosol solution inhaler, Inhale 2 puffs  Daily., Disp: , Rfl:   •  Mable Trevino SoloStar 300 UNIT/ML solution pen-injector injection, INJECT 140 UNITS  SUBCUTANEOUSLY INTO THE  APPROPRIATE AREA DAILY AS  DIRECTED, Disp: 42 mL, Rfl: 1  •  vitamin D (ERGOCALCIFEROL) 1.25 MG (12052 UT) capsule capsule, TAKE 1 CAPSULE BY MOUTH  WEEKLY, Disp: 12 capsule, Rfl: 1      Labs:  There were no vitals filed for this visit.  There were no vitals filed for this visit.  Lab Results   Component Value Date    HGBA1C 9.8 (H) 04/30/2021     Lab Results   Component Value Date    GLUCOSE 153 (H) 03/27/2020    CALCIUM 9.5 04/09/2021     04/09/2021    K 5.2 04/09/2021    CO2 29.0 04/09/2021     04/09/2021    BUN 21 04/09/2021    CREATININE 1.18 04/09/2021    EGFRIFAFRI 72 04/09/2021    EGFRIFNONA 59 (L) 04/09/2021    BCR 17.8 04/09/2021    ANIONGAP 13.0 03/27/2020     Lab Results   Component Value Date    CHOL 103 02/13/2019    CHLPL 93 04/09/2021    TRIG 165 (H) 04/09/2021    HDL 33 (L) 04/09/2021    LDL 32 04/09/2021         Drug-Drug Interactions:   No significant DDIs were noted.        Medication Assessment:   1. Aspirin - Not Indicated (taking apixaban)  2. Statin - Currently Taking  3. ACEi/ARB - Not currently taking      Medication Education on Specialty Medication:  Jardiance  · Discussed the medication's MOA and that it may cause more frequent urination particularly upon starting the medication  · Take one tablet in the morning with or without food    · Encouraged to drink plenty of water as to not get dehydrated especially in warmer weather or with activity  · Also discussed there may be an increased incidence of UTIs or yeast infections and to monitor for signs/symptoms        Assessment / Plan:  1. The patient was provided medication education via verbal information. Patient expressed understanding and had no further questions at this time.  2. Consider adding an ACEi or ARB in the future.  3. Discussed the [facility] pharmacy services that are available  to him, including monitoring of refills, prior authorizations, and copay coupon cards, as applicable, as well as curb-side pick-up or mail-order as needed.  o We looked into providing the FreeStyle Sj for him through , but it must be billed through DME per his insurance.  4. Contact information for the pharmacy team was provided to the patient.        Whit Roberts, Divya  7/22/2021  16:41 EDT

## 2021-08-25 NOTE — TELEPHONE ENCOUNTER
Patient's wife called.     She said he needs the omeprazole refilled for a 90 day supply. He is completely out of the medication    Please send to [facility]

## 2021-09-28 NOTE — TELEPHONE ENCOUNTER
Spoke with patient's son (Cheikh) he states that is father is needing a knee replacement that will be done by Dr. Yohan De Dios with [facility]. Surgery hasn't been scheduled yet but would like this done soon due to patient could fall. Patient seems to not have any heart problems lately. He is also needing stop his Eliquis prior to surgery.   Cheikh's number*[phone]  Last OV 5/2021  Last Stress: 4/2020  Last Echo .3/2020  Cath 4/2020    Afib  Takes Eloquis    Per Dr. GOTTI patient needs an office.   Patient's son informed

## 2021-10-01 NOTE — PROGRESS NOTES
Subjective:        Noel Garcia is a 81 y.o. male who here for follow up    No chief complaint on file.    Cardiac clearance    HPI    This is a 81-year-old male, who is current with me.  Previous echo in 2020 revealed EF 54%, mild TV RV C is mildly dilated, LV diastolic dysfunction grade 1A and no evidence of pericardial effusion.  Stress test in 2020 was a positive stress test.  Cardiac cath in April 1, 2020 showed 1 vessel disease treat medically at this time.  He would be considered angioplasty if he has symptoms in the future.  Lipid panel 4/9/2021 revealed , HDL 33, LDL 32 and triglycerides 165.      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)         Relevant Orders    Stress Test With Myocardial Perfusion One Day      Diagnoses       Codes Comments    Paroxysmal atrial fibrillation (HCC)    -  Primary ICD-10-CM: I48.0  ICD-9-CM: 427.31     Congestive heart failure, unspecified HF chronicity, unspecified heart failure type (HCC)     ICD-10-CM: I50.9  ICD-9-CM: 428.0     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    Past Medical History:   Diagnosis Date   • Benign essential hypertension    •  Benign prostatic hyperplasia 1/21/2016   • Diabetes mellitus (CMS/HCC)    • Gastroesophageal reflux disease 1/21/2016   • GERD (gastroesophageal reflux disease)    • Hamartoma of lung (CMS/HCC)    • Hyperlipidemia    • Microalbuminuria    • Obesity    • Prostatic hyperplasia    • Type 2 diabetes mellitus (CMS/HCC)    • Vitamin D deficiency          reports that he has quit smoking. His smoking use included cigarettes. He has never used smokeless tobacco. He reports that he does not drink alcohol and does not use drugs.     Family History   Problem Relation Age of Onset   • Hypertension Father    • Coronary artery disease Father    • Diabetes Father    • Hyperlipidemia Father    • Coronary artery disease Brother        ROS     Review of Systems  Constitutional: No wt loss, fever, fatigue  Gastrointestinal: No nausea, abdominal pain  Behavioral/Psych: No insomnia or anxiety  Cardiovascular : Denies chest pain, and shortness of breath      Objective:           Vitals and nursing note reviewed.   Constitutional:       Appearance: Well-developed.   HENT:      Head: Normocephalic.      Right Ear: External ear normal.      Left Ear: External ear normal.   Neck:      Vascular: No JVD.   Pulmonary:      Effort: Pulmonary effort is normal. No respiratory distress.      Breath sounds: Normal breath sounds. No stridor. No rales.   Cardiovascular:      Normal rate. Regular rhythm.      No gallop.      Comments: BLE swelling  Pulses:     Intact distal pulses.   Abdominal:      General: Bowel sounds are normal. There is no distension.      Palpations: Abdomen is soft.      Tenderness: There is no abdominal tenderness. There is no guarding.   Musculoskeletal: Normal range of motion.         General: No tenderness.      Cervical back: Normal range of motion. Skin:     General: Skin is warm.   Neurological:      Mental Status: Alert and oriented to person, place, and time.      Deep Tendon Reflexes: Reflexes are normal and symmetric.    Psychiatric:         Judgment: Judgment normal.           ECG 12 Lead    Date/Time: 10/1/2021 1:18 PM  Performed by: Livier Jay APRN  Authorized by: Livier Jay APRN   Comparison: compared with previous ECG from 5/10/2021  Similar to previous ECG  Comparison to previous ECG: Nonspecific T waves  Rhythm: sinus rhythm  BPM: 86  Conduction: right bundle branch block    Clinical impression: non-specific ECG            Interpretation Summary    · Mild tricuspid valve regurgitation is present.  · Right ventricular cavity is mildly dilated.  · Left ventricular diastolic dysfunction (grade I a) consistent with impaired relaxation.  · Calculated EF = 54%.  · There is no evidence of pericardial effusion.        2020  Interpretation Summary       · Findings consistent with an equivocal ECG stress test.  · Left ventricular ejection fraction is normal (Calculated EF = 60%).  · Myocardial perfusion imaging indicates a small-to-medium-sized, mild-to-moderately severe area of ischemia located in the apex and inferior wall.  · Impressions are consistent with an intermediate risk study.     Asymptomatic for chest pain. Specificity of study reduced secondary to significant motion artifact   to baseline Non-specific ST-T wave abnormalities, however ECG is not suggestive of ischemia  Ectopy: occasional PVC's at baseline and through out.  Blood pressure response: Baseline /67, Peak 120/74 (post Lexiscan), Recovery 128/80- 138/62  harmacologic study due to inability to tolerate increasing speed and grade of treadmill due to Pulmonary status and  3-4 +Pitting  edema in lower extremities.  Participated in low level exercise and tolerance is poor.  Supervised by: Dr. Carr       Impression:      1. Normal left main  2. Left anterior descending early atherosclerotic plaque including the diagonal and  branches  3. Circumflex arteries are codominant with early atherosclerotic plaque  4. 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 Normal LV gram     Recommendations:      1. 1 vessel disease treat medically if continues to have the symptoms angioplasty would be considered            I sincerely appreciate the opportunity to participate in your patient's care. Please feel free to contact me anytime if I can be of assistance in this or any other way.     Can Carr MD  4/1/2020  11:53      Current Outpatient Medications:   •  Accu-Chek FastClix Lancets misc, CHECK BLOOD SUGAR 3 TIMES  DAILY, Disp: 306 each, Rfl: 1  •  apixaban (Eliquis) 5 MG tablet tablet, Take 1 tablet by mouth Every 12 (Twelve) Hours., Disp: 180 tablet, Rfl: 3  •  Continuous Blood Gluc Sensor (FreeStyle Sj 14 Day Sensor) misc, 1 each Every 14 (Fourteen) Days., Disp: 6 each, Rfl: 1  •  ezetimibe-simvastatin (VYTORIN) 10-40 MG per tablet, TAKE 1 TABLET BY MOUTH  DAILY, Disp: 90 tablet, Rfl: 3  •  furosemide (LASIX) 40 MG tablet, TAKE 1 TABLET BY MOUTH  DAILY (Patient taking differently: Take 40 mg by mouth Daily As Needed (lower extremity edema).), Disp: 90 tablet, Rfl: 1  •  gabapentin (NEURONTIN) 100 MG capsule, Take 100 mg by mouth 3 (Three) Times a Day., Disp: , Rfl:   •  glucose blood (Accu-Chek Asha Plus) test strip, 1 each by Other route 3 (Three) Times a Day. as directed, Disp: 300 each, Rfl: 1  •  glucose blood test strip, Check blood sugar 3-4 times a day, Disp: 400 each, Rfl: 2  •  HumaLOG KwikPen 100 UNIT/ML solution pen-injector, INJECT 22 UNITS  SUBCUTANEOUSLY PRIOR TO  EVERY MEAL, Disp: 60 mL, Rfl: 0  •  hydrOXYzine (ATARAX) 25 MG tablet, Take 25 mg by mouth Every 6 (Six) Hours As Needed for Itching., Disp: , Rfl:   •  Insulin Pen Needle (B-D UF III MINI PEN NEEDLES) 31G X 5 MM misc, USE 4 TIMES DAILY FOR  INSULIN INJECTION, Disp: 360 each, Rfl: 1  •  Jardiance 25 MG tablet, Take 25 mg by mouth Daily., Disp: 90 tablet, Rfl: 1  •  metFORMIN (GLUCOPHAGE) 500 MG tablet, Take 2 tablets by mouth 2 (Two) Times a  Day With Meals for 180 days., Disp: 360 tablet, Rfl: 0  •  metoprolol succinate XL (TOPROL-XL) 25 MG 24 hr tablet, TAKE ONE-HALF TABLET BY  MOUTH DAILY, Disp: 45 tablet, Rfl: 3  •  omeprazole (priLOSEC) 20 MG capsule, Take 1 capsule by mouth Daily., Disp: 30 capsule, Rfl: 0  •  ondansetron (ZOFRAN) 8 MG tablet, Take 8 mg by mouth Every 8 (Eight) Hours As Needed., Disp: , Rfl:   •  PEG 3350-KCl-NaBcb-NaCl-NaSulf (PEG-3350/Electrolytes) 236 g reconstituted solution, MIX AND DRINK AS DIRECTED, Disp: , Rfl:   •  polyethylene glycol (Golytely) 236 g solution, Take 4,000 mL by mouth., Disp: , Rfl:   •  potassium chloride (MICRO-K) 10 MEQ CR capsule, Take 1 capsule by mouth Daily. (Patient taking differently: Take 10 mEq by mouth Daily As Needed (when he takes furosemide).), Disp: 180 capsule, Rfl: 1  •  tiotropium bromide-olodaterol (STIOLTO RESPIMAT) 2.5-2.5 MCG/ACT aerosol solution inhaler, Inhale 2 puffs Daily., Disp: , Rfl:   •  Toujeo Max SoloStar 300 UNIT/ML solution pen-injector injection, INJECT 140 UNITS  SUBCUTANEOUSLY INTO THE  APPROPRIATE AREA DAILY AS  DIRECTED, Disp: 42 mL, Rfl: 1  •  Tradjenta 5 MG tablet tablet, TAKE 1 TABLET BY MOUTH  DAILY, Disp: 90 tablet, Rfl: 3  •  vitamin D (ERGOCALCIFEROL) 1.25 MG (10048 UT) capsule capsule, TAKE 1 CAPSULE BY MOUTH  WEEKLY, Disp: 12 capsule, Rfl: 1     Assessment:        Patient Active Problem List   Diagnosis   • Vitamin D deficiency   • Benign essential hypertension   • Diabetes mellitus (HCC)   • Gastroesophageal reflux disease   • Hyperlipidemia   • Microalbuminuria   • Adiposity   • Benign prostatic hyperplasia   • Uncontrolled type 2 diabetes mellitus (HCC)   • Cerebrovascular accident (HCC)   • Elevated lipids   • Hamartoma (HCC)   • Hypertension   • Malignant melanoma (HCC)   • Paroxysmal atrial fibrillation (HCC)   • Anticoagulated   • CHF (congestive heart failure) (HCC)   • Abnormal cardiac function test               Plan:   1.  Preop clearance: I will  do a stress test and echo for clearance for knee surgery.  Previous cardiac clearance as above.    It was explained to the patient that stress testing carries 85% specificity/sensitivity, and does not rule out future cardiac event.  Risks of the procedure were explained to the patient including shortness of breath, induction of myocardial infarction, and dizziness.  Patient is agreeable to proceeding with stress testing.       2.  Paroxysmal atrial fibrillation: He denies any chest pain.  Continue Eliquis and beta-blockade.    Pros and cons as well as indication of the anticoagulation has been explained to the patient in detail. There are no obvious complications at this stage. Risk of  the bleedings has been explained. Need for the regular blood workup and adjust the dose has been explained. Need for proper follow-up on anticoagulation also has been explained.     3.  Congestive heart failure: Previous EF 54%, see full report as above.  Continue beta-blockade.    Monitor sodium intake and fluid intake.  He needs to elevate his legs and wear compression stockings.       4.  Hypertension: Today in the office blood pressure is slightly elevated.  He states his blood pressure is better at home. His wife passed away yesterday.  He will continue to monitor his blood pressure.      Risk of the hyperlipidemia, importance of the treatment has been explained. Pros and cons of the statins has been explained. Regular blood workup as well as side effects including the liver failure, myelopathy death has been explained.                 ICD-10-CM ICD-9-CM   1. Paroxysmal atrial fibrillation (HCC)  I48.0 427.31   2. Congestive heart failure, unspecified HF chronicity, unspecified heart failure type (HCC)  I50.9 428.0   3. 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       SMOKING COUNSELING: denies    Office calling to get medication records from pharmacy.  We will do a stress test and echo for cardiac clearance.    Sincerely,   EMILY David  [facility]  10/01/21  10:57 EDT    EMR Dragon/Transcription disclaimer:   Much of this encounter note is an electronic transcription/translation of spoken language to printed text. The electronic translation of spoken language may permit erroneous, or at times, nonsensical words or phrases to be inadvertently transcribed; Although I have reviewed the note for such errors, some may still exist.

## 2021-10-07 NOTE — PROGRESS NOTES
CLEARANCE FOR KNEE SURGERY   Subjective:        Noel Garcia is a 81 y.o. male who here for follow up    CC  Follow-up atrial fibrillation hypertension hyperlipidemia  HPI  81-year-old male with paroxysmal atrial fibrillation, benign essential arterial hypertension and hyperlipidemia here for the follow-up with no complaints of chest pains tightness heaviness or the pressure sensation     Problems Addressed this Visit        Cardiac and Vasculature    Hyperlipidemia    Hypertension    Paroxysmal atrial fibrillation (HCC)    CHF (congestive heart failure) (HCC) - Primary      Diagnoses       Codes Comments    Congestive heart failure, unspecified HF chronicity, unspecified heart failure type (HCC)    -  Primary ICD-10-CM: I50.9  ICD-9-CM: 428.0     Paroxysmal atrial fibrillation (HCC)     ICD-10-CM: I48.0  ICD-9-CM: 427.31     Primary hypertension     ICD-10-CM: I10  ICD-9-CM: 401.9     Hyperlipidemia, unspecified hyperlipidemia type     ICD-10-CM: E78.5  ICD-9-CM: 272.4         .Interpretation Summary       · Findings consistent with an equivocal ECG stress test.  · Left ventricular ejection fraction is normal. (Calculated EF = 60%).  · Myocardial perfusion imaging indicates a medium-sized infarct located in the inferior wall with mild bailey-infarct ischemia.  · Impressions are consistent with an intermediate risk study.         The following portions of the patient's history were reviewed and updated as appropriate: allergies, current medications, past family history, past medical history, past social history, past surgical history and problem list.    Past Medical History:   Diagnosis Date   • Benign essential hypertension    • Benign prostatic hyperplasia 1/21/2016   • Cancer (HCC)     melonma   several years ago   • Diabetes mellitus (HCC)    • Gastroesophageal reflux disease 1/21/2016   • GERD (gastroesophageal reflux disease)    • Hamartoma of lung (HCC)    • Hyperlipidemia    • Microalbuminuria    • Obesity  "   • Prostatic hyperplasia    • Type 2 diabetes mellitus (HCC)    • Vitamin D deficiency      reports that he has quit smoking. His smoking use included cigarettes. He has never used smokeless tobacco. He reports that he does not drink alcohol and does not use drugs.   Family History   Problem Relation Age of Onset   • Hypertension Father    • Coronary artery disease Father    • Diabetes Father    • Hyperlipidemia Father    • Coronary artery disease Brother        Review of Systems  Constitutional: No wt loss, fever, fatigue  Gastrointestinal: No nausea, abdominal pain  Behavioral/Psych: No insomnia or anxiety   Cardiovascular no chest pains or tightness in the chest  Objective:       Physical Exam  /82   Pulse 81   Ht 182.9 cm (72\")   Wt 99.8 kg (220 lb)   BMI 29.84 kg/m²   General appearance: No acute changes   Neck: Trachea midline; NECK, supple, no thyromegaly or lymphadenopathy   Lungs: Normal size and shape, normal breath sounds, equal distribution of air, no rales and rhonchi   CV: S1-S2 regular, no murmurs, no rub, no gallop   Abdomen: Soft, non-tender; no masses , no abnormal abdominal sounds   Extremities: No deformity , normal color , no peripheral edema   Skin: Normal temperature, turgor and texture; no rash, ulcers          Procedures      Echocardiogram:        Current Outpatient Medications:   •  Accu-Chek FastClix [place]ets [facility] [place] BLOOD SUGAR 3 TIMES  DAILY, Disp: 306 each, Rfl: 1  •  apixaban (Eliquis) 5 MG tablet tablet, Take 1 tablet by mouth Every 12 (Twelve) Hours., Disp: 180 tablet, Rfl: 3  •  Continuous Blood Gluc Sensor (FreeStyle Sj 14 Day Sensor) [facility] [place] 1 each Every 14 (Fourteen) Days., Disp: 6 each, Rfl: 1  •  ezetimibe-simvastatin (VYTORIN) 10-40 MG per tablet, TAKE 1 TABLET BY MOUTH  DAILY, Disp: 90 tablet, Rfl: 3  •  furosemide (LASIX) 40 MG tablet, TAKE 1 TABLET BY MOUTH  DAILY (Patient taking differently: Take 40 mg by mouth Daily As Needed (lower extremity edema).), " Disp: 90 tablet, Rfl: 1  •  gabapentin (NEURONTIN) 800 MG tablet, , Disp: , Rfl:   •  glucose blood (Accu-Chek Asha Plus) test strip, 1 each by Other route 3 (Three) Times a Day. as directed, Disp: 300 each, Rfl: 1  •  glucose blood test strip, Check blood sugar 3-4 times a day, Disp: 400 each, Rfl: 2  •  HumaLOG KwikPen 100 UNIT/ML solution pen-injector, INJECT 22 UNITS  SUBCUTANEOUSLY PRIOR TO  EVERY MEAL, Disp: 60 mL, Rfl: 0  •  hydrOXYzine (ATARAX) 25 MG tablet, Take 25 mg by mouth Every 6 (Six) Hours As Needed for Itching., Disp: , Rfl:   •  Insulin Pen Needle (B-D UF III MINI PEN NEEDLES) 31G X 5 MM misc, USE 4 TIMES DAILY FOR  INSULIN INJECTION, Disp: 360 each, Rfl: 1  •  Jardiance 25 MG tablet, Take 25 mg by mouth Daily., Disp: 90 tablet, Rfl: 1  •  metFORMIN (GLUCOPHAGE) 500 MG tablet, Take 2 tablets by mouth 2 (Two) Times a Day With Meals for 180 days., Disp: 360 tablet, Rfl: 0  •  metoprolol succinate XL (TOPROL-XL) 25 MG 24 hr tablet, TAKE ONE-HALF TABLET BY  MOUTH DAILY, Disp: 45 tablet, Rfl: 3  •  omeprazole (priLOSEC) 20 MG capsule, TAKE 1 CAPSULE BY MOUTH  DAILY, Disp: 30 capsule, Rfl: 11  •  ondansetron (ZOFRAN) 8 MG tablet, Take 8 mg by mouth Every 8 (Eight) Hours As Needed., Disp: , Rfl:   •  potassium chloride (MICRO-K) 10 MEQ CR capsule, Take 1 capsule by mouth Daily. (Patient taking differently: Take 10 mEq by mouth Daily As Needed (when he takes furosemide).), Disp: 180 capsule, Rfl: 1  •  tiotropium bromide-olodaterol (STIOLTO RESPIMAT) 2.5-2.5 MCG/ACT aerosol solution inhaler, Inhale 2 puffs Daily., Disp: , Rfl:   •  Toujeo Max SoloStar 300 UNIT/ML solution pen-injector injection, INJECT 140 UNITS  SUBCUTANEOUSLY INTO THE  APPROPRIATE AREA DAILY AS  DIRECTED, Disp: 42 mL, Rfl: 1  •  Tradjenta 5 MG tablet tablet, TAKE 1 TABLET BY MOUTH  DAILY, Disp: 90 tablet, Rfl: 3  •  vitamin D (ERGOCALCIFEROL) 1.25 MG (67246 UT) capsule capsule, TAKE 1 CAPSULE BY MOUTH  WEEKLY, Disp: 12 capsule, Rfl:  1  No current facility-administered medications for this visit.   Assessment:        Patient Active Problem List   Diagnosis   • Vitamin D deficiency   • Benign essential hypertension   • Diabetes mellitus (HCC)   • Gastroesophageal reflux disease   • Hyperlipidemia   • Microalbuminuria   • Adiposity   • Benign prostatic hyperplasia   • Uncontrolled type 2 diabetes mellitus (HCC)   • Cerebrovascular accident (HCC)   • Elevated lipids   • Hamartoma (HCC)   • Hypertension   • Malignant melanoma (HCC)   • Paroxysmal atrial fibrillation (HCC)   • Anticoagulated   • CHF (congestive heart failure) (HCC)   • Abnormal cardiac function test     Interpretation Summary    · Estimated right ventricular systolic pressure from tricuspid regurgitation is normal (<35 mmHg). Calculated right ventricular systolic pressure from tricuspid regurgitation is 29 mmHg.  · Calculated left ventricular EF = 56.9% Estimated left ventricular EF was in agreement with the calculated left ventricular EF.  · Left ventricular diastolic function is consistent with (grade Ia w/high LAP) impaired relaxation.  · There is no evidence of pericardial effusion. .                  Plan:            ICD-10-CM ICD-9-CM   1. Congestive heart failure, unspecified HF chronicity, unspecified heart failure type (HCC)  I50.9 428.0   2. Paroxysmal atrial fibrillation (HCC)  I48.0 427.31   3. Primary hypertension  I10 401.9   4. Hyperlipidemia, unspecified hyperlipidemia type  E78.5 272.4     1. Congestive heart failure, unspecified HF chronicity, unspecified heart failure type (HCC)  Compensated    2. Paroxysmal atrial fibrillation (HCC)  Under control    3. Primary hypertension  Under control    4. Hyperlipidemia, unspecified hyperlipidemia type  Continue current treatment     Abnormal cardiac fun test    Patient remains asymptomatic we will treat medically    Specificity and sensitivity of the stress test/ cardiac workup has been explained. Pt has been explained if   Symptoms continue please go to ER, and further w/p will be required.    Also explained this does not rule out coronary artery disease or the future events, continue to emphasize on risk reductions for coronary artery disease    Pt also advised to contact PCP for other causes of symptoms    Noel Garcia seen and examined with no clinical signs of angina or chf, pt is cleared for surgery with non modifiable risk factors.  Noel Garcia has been advised to take cardiac meds with sip of water on the day of surgery.    Please use beta blocker for tachycardia perioperatively    Anticoagulation to be managed appropriately    Watch for chest pain, shortness of breath, palpitations, arrhythmias, and significant change in the blood pressure perioperatively,     Please check EKG preop and postop if any questions, notify us if any change in patient's cardiovascular conditions    Stop elloquise 3 days  6 months  Treat medically    COUNSELING:    Noel Scruggs was given to patient for the following topics: diagnostic results, risk factor reductions, impressions, risks and benefits of treatment options and importance of treatment compliance .       SMOKING COUNSELING:    [unfilled]    Dictated using Dragon dictation

## 2021-10-11 NOTE — DISCHARGE INSTRUCTIONS
Take the following medications the morning of surgery:    NEURONTIN, METOPROLOL, OMEPRAZOLE, RESPIMAT    If you are on prescription narcotic pain medication to control your pain you may also take that medication the morning of surgery.    General Instructions:  • Do not eat solid food after midnight the night before surgery.  • You may drink clear liquids day of surgery but must stop at least one hour before your hospital arrival time.  • It is beneficial for you to have a clear drink that contains carbohydrates the day of surgery.  We suggest  a 12 to 20 ounce bottle of G2 or Powerade Zero for diabetic patients.     Clear liquids are liquids you can see through.  Nothing red in color.     Plain water                               Sports drinks  Sodas                                   Gelatin (Jell-O)  Fruit juices without pulp such as white grape juice and apple juice  Popsicles that contain no fruit or yogurt  Tea or coffee (no cream or milk added)  Gatorade / Powerade  G2 / Powerade ZeRO      •   • Bring any papers given to you in the doctor’s office.  • Wear clean comfortable clothes.  • Do not wear contact lenses, false eyelashes or make-up.  Bring a case for your glasses.   • Remove all piercings.  Leave jewelry and any other valuables at home.  • The Pre-Admission Testing nurse will instruct you to bring medications if unable to obtain an accurate list in Pre-Admission Testing.   • REPORT TO SURGERY ENTRANCE ON 10- AT 1330 PM   •           Preventing a Surgical Site Infection:  • For 2 to 3 days before surgery, avoid shaving with a razor because the razor can irritate skin and make it easier to develop an infection.    • Any areas of open skin can increase the risk of a post-operative wound infection by allowing bacteria to enter and travel throughout the body.  Notify your surgeon if you have any skin wounds / rashes even if it is not near the expected surgical site.  The area will need assessed to  determine if surgery should be delayed until it is healed.  • The night prior to surgery shower using a fresh bar of anti-bacterial soap (such as Dial) and clean washcloth.  Sleep in a clean bed with clean clothing.  Do not allow pets to sleep with you.  • Shower on the morning of surgery using a fresh bar of anti-bacterial soap (such as Dial) and clean washcloth.  Dry with a clean towel and dress in clean clothing.  • Ask your surgeon if you will be receiving antibiotics prior to surgery.  • Make sure you, your family, and all healthcare providers clean their hands with soap and water or an alcohol based hand  before caring for you or your wound.    Day of surgery:  Your arrival time is approximately two hours before your scheduled surgery time.  Upon arrival, a Pre-op nurse and Anesthesiologist will review your health history, obtain vital signs, and answer questions you may have.  The only belongings needed at this time will be a list of your home medications and if applicable your C-PAP/BI-PAP machine.  A Pre-op nurse will start an IV and you may receive medication in preparation for surgery, including something to help you relax.     Please be aware that surgery does come with discomfort.  We want to make every effort to control your discomfort so please discuss any uncontrolled symptoms with your nurse.   Your doctor will most likely have prescribed pain medications.        CHLORHEXIDINE CLOTH INSTRUCTIONS  The morning of surgery follow these instructions using the Chlorhexidine cloths you've been given.  These steps reduce bacteria on the body.  Do not use the cloths near your eyes, ears mouth, genitalia or on open wounds.  Throw the cloths away after use but do not try to flush them down a toilet.      • Open and remove one cloth at a time from the package.    • Leave the cloth unfolded and begin the bathing.  • Massage the skin with the cloths using gentle pressure to remove bacteria.  Do not scrub  harshly.   • Follow the steps below with one 2% CHG cloth per area (6 total cloths).  • One cloth for neck, shoulders and chest.  • One cloth for both arms, hands, fingers and underarms (do underarms last).  • One cloth for the abdomen followed by groin.  • One cloth for right leg and foot including between the toes.  • One cloth for left leg and foot including between the toes.  • The last cloth is to be used for the back of the neck, back and buttocks.    Allow the CHG to air dry 3 minutes on the skin which will give it time to work and decrease the chance of irritation.  The skin may feel sticky until it is dry.  Do not rinse with water or any other liquid or you will lose the beneficial effects of the CHG.  If mild skin irritation occurs, do rinse the skin to remove the CHG.  Report this to the nurse at time of admission.  Do not apply lotions, creams, ointments, deodorants or perfumes after using the clothes. Dress in clean clothes before coming to the hospital.    BACTROBAN NASAL OINTMENT      X 3  There are many germs normally in your nose. Bactroban is an ointment that will help reduce these germs. Please follow these instructions for Bactroban use:      __1__The day before surgery in the morning  [id number]__    _2___The day before surgery in the evening              [id number]_    _3___The day of surgery in the morning    Date_  [id number]__[id number]_____    **Squirt ½ package of Bactroban Ointment onto a cotton applicator and apply to inside of 1st nostril.  Squirt the remaining Bactroban and apply to the inside of the other nostril.        If you are staying overnight following surgery, you will be transported to your hospital room following the recovery period.  [facility] has all private rooms.    If you have any questions please call Pre-Admission Testing at [phone].  Deductibles and co-payments are collected on the day of service. Please be  prepared to pay the required co-pay, deductible or deposit on the day of service as defined by your plan.    Patient Education for Self-Quarantine Process    • Following your COVID testing, we strongly recommend that you wear a mask when you are with other people and practice social distancing.   • Limit your activities to only required outings.  • Wash your hands with soap and water frequently for at least 20 seconds.   • Avoid touching your eyes, nose and mouth with unwashed hands.  • Do not share anything - utensils, drinking glasses, food from the same bowl.   • Sanitize household surfaces daily. Include all high touch areas (door handles, light switches, phones, countertops, etc.)    Call your surgeon immediately if you experience any of the following symptoms:  • Sore Throat  • Shortness of Breath or difficulty breathing  • Cough  • Chills  • Body soreness or muscle pain  • Headache  • Fever  • New loss of taste or smell  • Do not arrive for your surgery ill.  Your procedure will need to be rescheduled to another time.  You will need to call your physician before the day of surgery to avoid any unnecessary exposure to hospital staff as well as other patients.

## 2021-10-11 NOTE — TELEPHONE ENCOUNTER
Patient's wife  last week, and had preadmission testing for knee surgery but his sugar was very off today. Surgery is at 1:30 on Friday      His son thinks if he falls he could pass away as well. So he would for his father to have an appointment as soon as possible this week. Please call Cheikh (the son) at [phone].

## (undated) DEVICE — PK CATH CARD 40

## (undated) DEVICE — CATH VENT MIV RADL PIG ST TIP 4F 110CM

## (undated) DEVICE — Device

## (undated) DEVICE — GLIDESHEATH SLENDER STAINLESS STEEL KIT: Brand: GLIDESHEATH SLENDER

## (undated) DEVICE — CATH DIAG IMPULSE FR4 5F 100CM

## (undated) DEVICE — GW EMR FIX EXCHG J STD .035 3MM 260CM

## (undated) DEVICE — CATH DIAG IMPULSE FL3.5 5F 100CM